# Patient Record
Sex: FEMALE | Race: WHITE | NOT HISPANIC OR LATINO | Employment: OTHER | URBAN - METROPOLITAN AREA
[De-identification: names, ages, dates, MRNs, and addresses within clinical notes are randomized per-mention and may not be internally consistent; named-entity substitution may affect disease eponyms.]

---

## 2018-01-05 ENCOUNTER — HISTORICAL (OUTPATIENT)
Dept: RADIOLOGY | Facility: HOSPITAL | Age: 48
End: 2018-01-05

## 2018-01-23 ENCOUNTER — HISTORICAL (OUTPATIENT)
Dept: ADMINISTRATIVE | Facility: HOSPITAL | Age: 48
End: 2018-01-23

## 2018-02-07 ENCOUNTER — HISTORICAL (OUTPATIENT)
Dept: INTERNAL MEDICINE | Facility: CLINIC | Age: 48
End: 2018-02-07

## 2018-02-07 LAB
T3FREE SERPL-MCNC: 2.66 PG/ML (ref 2.18–3.98)
T4 FREE SERPL-MCNC: 0.78 NG/DL (ref 0.76–1.46)
TSH SERPL-ACNC: 1.52 MIU/L (ref 0.36–3.74)

## 2018-02-21 ENCOUNTER — HISTORICAL (OUTPATIENT)
Dept: OTOLARYNGOLOGY | Facility: CLINIC | Age: 48
End: 2018-02-21

## 2018-03-07 ENCOUNTER — HISTORICAL (OUTPATIENT)
Dept: INTERNAL MEDICINE | Facility: CLINIC | Age: 48
End: 2018-03-07

## 2018-04-08 ENCOUNTER — HISTORICAL (OUTPATIENT)
Dept: SLEEP MEDICINE | Facility: HOSPITAL | Age: 48
End: 2018-04-08

## 2018-05-04 ENCOUNTER — HISTORICAL (OUTPATIENT)
Dept: INTERNAL MEDICINE | Facility: CLINIC | Age: 48
End: 2018-05-04

## 2018-06-18 ENCOUNTER — HISTORICAL (OUTPATIENT)
Dept: OTOLARYNGOLOGY | Facility: CLINIC | Age: 48
End: 2018-06-18

## 2019-01-10 ENCOUNTER — HISTORICAL (OUTPATIENT)
Dept: ADMINISTRATIVE | Facility: HOSPITAL | Age: 49
End: 2019-01-10

## 2020-02-11 ENCOUNTER — HISTORICAL (OUTPATIENT)
Dept: ADMINISTRATIVE | Facility: HOSPITAL | Age: 50
End: 2020-02-11

## 2020-02-19 ENCOUNTER — HISTORICAL (OUTPATIENT)
Dept: RADIOLOGY | Facility: HOSPITAL | Age: 50
End: 2020-02-19

## 2022-04-10 ENCOUNTER — HISTORICAL (OUTPATIENT)
Dept: ADMINISTRATIVE | Facility: HOSPITAL | Age: 52
End: 2022-04-10

## 2022-04-25 VITALS
HEIGHT: 62 IN | SYSTOLIC BLOOD PRESSURE: 138 MMHG | BODY MASS INDEX: 28.64 KG/M2 | OXYGEN SATURATION: 95 % | DIASTOLIC BLOOD PRESSURE: 92 MMHG | WEIGHT: 155.63 LBS

## 2022-05-02 DIAGNOSIS — H91.90 PROBLEMS WITH HEARING: Primary | ICD-10-CM

## 2022-05-12 DIAGNOSIS — H91.90 HEARING LOSS, UNSPECIFIED HEARING LOSS TYPE, UNSPECIFIED LATERALITY: ICD-10-CM

## 2022-05-12 DIAGNOSIS — D17.0 LIPOMA OF NECK: Primary | ICD-10-CM

## 2022-05-16 ENCOUNTER — CLINICAL SUPPORT (OUTPATIENT)
Dept: AUDIOLOGY | Facility: HOSPITAL | Age: 52
End: 2022-05-16

## 2022-05-16 DIAGNOSIS — H90.6 MIXED HEARING LOSS, BILATERAL: Primary | ICD-10-CM

## 2022-05-16 DIAGNOSIS — H91.90 PROBLEMS WITH HEARING: ICD-10-CM

## 2022-05-16 PROCEDURE — 92567 TYMPANOMETRY: CPT | Performed by: AUDIOLOGIST

## 2022-05-16 PROCEDURE — 92557 COMPREHENSIVE HEARING TEST: CPT | Performed by: AUDIOLOGIST

## 2022-05-16 NOTE — PROGRESS NOTES
Reason for visit:  Ms. Dance has a long-standing mixed hearing loss, AU, reporting no changes in hearing since previous evaluation.  Tinnitus and vertiginous issues are also unchanged.  She denies any recent middle ear issues.      Otoscopy:  Clear EAC's and TM's, bilaterally.    Tymps:    AD: Type 'B' tymp with normal (1.06mL) volume  AS: Type 'C' tymp (-184daPa) with minimal TM compliance (.19mL)    Acoustic Reflexes:  dnt    Audio Summary  AD:   Moderate to severe mixed hearing loss from 250-8kHz. Speech reception thresholds are in agreement with puretone findings. Speech discrimination score of 100% is considered excellent.     AS:   Moderate to severe mixed hearing loss from 250-8kHz. Speech reception thresholds are in agreement with puretone findings. Speech discrimination score of 96% is considered excellent.     Interpretation:  Today's audiological evaluation indicates no changes in hearing.  Immittance measures are considered abnormal suggesting middle ear pathology for the right ear and negative middle ear pressure and minimal TM mobility for the left ear. These results are also unchanged since previous evaluation.    Recommendations:    1. Continue with otologic follow up  2. Annual hearing evaluations  3. Binaural amplification (information on [place] given)

## 2022-05-18 DIAGNOSIS — D17.0 LIPOMA OF NECK: ICD-10-CM

## 2022-05-18 DIAGNOSIS — R22.1 LOCALIZED SWELLING, MASS AND LUMP, NECK: Primary | ICD-10-CM

## 2022-06-03 ENCOUNTER — HOSPITAL ENCOUNTER (OUTPATIENT)
Dept: RADIOLOGY | Facility: HOSPITAL | Age: 52
Discharge: HOME | End: 2022-06-03
Attending: OTOLARYNGOLOGY

## 2022-06-03 DIAGNOSIS — D17.0 LIPOMA OF NECK: ICD-10-CM

## 2022-06-03 DIAGNOSIS — R22.1 LOCALIZED SWELLING, MASS AND LUMP, NECK: ICD-10-CM

## 2022-06-03 PROCEDURE — 76536 US EXAM OF HEAD AND NECK: CPT | Mod: TC

## 2022-06-22 ENCOUNTER — PATIENT MESSAGE (OUTPATIENT)
Dept: OTOLARYNGOLOGY | Facility: CLINIC | Age: 52
End: 2022-06-22

## 2022-07-14 ENCOUNTER — HOSPITAL ENCOUNTER (OUTPATIENT)
Dept: RADIOLOGY | Facility: HOSPITAL | Age: 52
Discharge: HOME | End: 2022-07-14
Attending: OTOLARYNGOLOGY

## 2022-07-14 DIAGNOSIS — D17.0 LIPOMA OF NECK: ICD-10-CM

## 2022-07-14 DIAGNOSIS — H91.90 HEARING LOSS, UNSPECIFIED HEARING LOSS TYPE, UNSPECIFIED LATERALITY: ICD-10-CM

## 2022-07-14 PROCEDURE — 25500020 PHARM REV CODE 255

## 2022-07-14 PROCEDURE — 70491 CT SOFT TISSUE NECK W/DYE: CPT | Mod: TC

## 2022-07-14 RX ADMIN — IOPAMIDOL 75 ML: 755 INJECTION, SOLUTION INTRAVENOUS at 10:07

## 2022-07-20 ENCOUNTER — OFFICE VISIT (OUTPATIENT)
Dept: OTOLARYNGOLOGY | Facility: CLINIC | Age: 52
End: 2022-07-20

## 2022-07-20 VITALS
SYSTOLIC BLOOD PRESSURE: 125 MMHG | HEART RATE: 61 BPM | RESPIRATION RATE: 16 BRPM | DIASTOLIC BLOOD PRESSURE: 77 MMHG | TEMPERATURE: 99 F | HEIGHT: 61 IN | BODY MASS INDEX: 28.89 KG/M2 | WEIGHT: 153 LBS

## 2022-07-20 DIAGNOSIS — D17.0 LIPOMA OF NECK: Primary | ICD-10-CM

## 2022-07-20 DIAGNOSIS — H83.8X3 SUPERIOR SEMICIRCULAR CANAL DEHISCENCE OF BOTH EARS: ICD-10-CM

## 2022-07-20 DIAGNOSIS — H90.6 MIXED CONDUCTIVE AND SENSORINEURAL HEARING LOSS OF BOTH EARS: ICD-10-CM

## 2022-07-20 DIAGNOSIS — Z01.818 OTHER SPECIFIED PRE-OPERATIVE EXAMINATION: Primary | ICD-10-CM

## 2022-07-20 PROCEDURE — 99214 OFFICE O/P EST MOD 30 MIN: CPT | Mod: PBBFAC | Performed by: OTOLARYNGOLOGY

## 2022-07-20 NOTE — PROGRESS NOTES
"Patient Name: Glenda Huval Dance   YOB: 1970     Chief Complaint:   Chief Complaint   Patient presents with    Follow-up     Discuss Results MRI/US        History of Present Illness:  46 yo female referred for evaluation of a thyroid nodule and hearing loss. In October, patient suffered an episode of angioedema after taking Augmentin and required 5 days of intubation. At that time a scan was obtained and she was incidentally found to have thyroid nodules measuring up to 1.2 cm. She also has a longstanding history of hearing loss. She states that since she was a kid she has had left sided hearing loss, now has some in the right. Had pulsatile tinnitus in the right and underwent repair of what sounds like an encephalocele by Dr. Villalobos (?) in [place] about 8-9 years ago. Was suppose to have surgery on the left and "rods" placed in her ears (? OCR or stapes) but lost her insurance. Last audio was 7 years ago. Patient also complains of constantly needing to clear her throat and persistent hoarseness since her intubation. Per record intubation was uneventful and a 7.5 ETT was used.    1/23/18: Improved hoarseness since last visit, did not see ST or pick-up PPI. Denies otalgia, odynophagia, dysphagia, dyspnea, new masses/lesions. Weight stable. Unable to perform audio 2/2 cerumen impaction. Thyroid US shows 2 solid nodules on left, amenable to FNA. Has appointment on 1/30/18 with IR per patient; received a message yesterday.    5/17/18: Here for f/u. Pt had surgery with Rosa Elena and Nidhi in [facility] a few years ago. 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 She has persistent right CHL and worse LEft CHL. 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 Was going to get BAHA with rosa elena but lost insurance.  Shes unsure why she is here today [1]    1/10/19: Here for f/u for CHL and thyroid nodules. Hearing stable subjectively. Had episode of spinning " dizziness exacerbated by head movement on 12/26/18, but this resolved and she hasn't had any spinning since. [1]    4/25/19: 49yoF smoker that presents for evaluation for FNA. She had a thyroid US in 2018 that showed a 2cm left superior pole nodule and 1.5cm inferior pole nodule. She notes globus sensation and weird sensation in throat. Intermittent dysphonia worst in the AM. PND. Thick mucus/phlegm that requires clearing her throat/coughing often. Discomfort in midline chest intermittently. Not on PPI. Most recent TFTs were WNL. Denies dyspnea, dysphagia, weight loss.    Smokes 1-1.5PPD [1]    2/11/20: Here for annual follow-up with audio. Audio stable. Patient reports dizziness that occurred a few months ago with spinning, nausea, vomiting exacerbated with head movements. This resolved and since she has had dizziness with eye movement at rest, with ambulation or driving. Last seconds. Feels that her vision becomes blurry. Feels hearing is stable, no otalgia, otorrhea, aural fullness or changes in tinnitus. Current smoker. [1]    April 26, 2022:  Patient presents today for follow-up of mass involving her right posterior neck. In reviewing the patient's chart patient was found to have probable lipoma involving the right posterior neck. Patient had a CT scan of the neck done on October 5, 2017, and it showed findings of a 3.6 x 2.2 cm mass in the right posterior neck and subcutaneous tissue which would be consistent with a lipoma. An ultrasound of the soft tissue of the neck was then done on March 7, 2018, and showed the lesion had increased in size to 5.2 x 5.0 x 1.8cm.  Since that time the patient has felt that the lesion has at least doubled in size. She does experience some occipital headaches on that side as well as some soreness in the neck and she is not sure if it is related to this lesion. She has no weakness or paresthesias involving the upper extremities or shoulder. No tenderness. No drainage.  Also of  note she does have a history of hearing loss and had undergone repair of an encephalocele and right superior semicircular canal occlusion via a right middle fossa approach by Dr. Ng several years ago. She had a persistent right conductive hearing loss and a left hearing loss. Records indicate that she also likely had a dehiscence on the left with an encephalocele. She was to get a BAHA hearing aid with Dr. Villalobos, but had lost her insurance. Her last audiogram was in February 2020.    July 20, 2022:   52-year-old female presents today for follow-up of suspected lipoma the right posterior neck and hearing loss with history of superior semicircular canal dehiscence.  In regards to her lipoma she has not noticed any change since her last appointment.  She had a CT scan of the soft tissues of the neck with contrast done on July 14, 2022, which showed the presence of a 4.8 x 7.9 x 5.4 cm subcutaneous soft tissue mass with consistency of a lipoma in the right posterior neck.  Previously on a CT scan done on October 5, 2017 it measured 2.3 x 3.8 x 2.4 cm.  On ultrasound done on Brie 3, 2022, the lesion measured 9.1 x 8.5 x 3.2 cm.  Lesion appeared to be confined to the subcutaneous tissue and did not invade any underlying muscle.  Radiologist indicated there were no concerning factors.  Results were discussed with the patient today.  The patient is still having some discomfort in her neck and occipital region as previously described.  Audiogram was done on May 16, 2022, and this showed the presence of a moderate to severe mixed hearing loss in the right ear at 250-8000 hertz and in the left ear a moderate to severe mixed hearing loss at 250-8000 hertz.  Speech reception threshold was 50 decibels in the right ear and 60 decibels in left ear with 100% discrimination in the right ear 96% discrimination in the left ear.  She had type B tympanograms bilaterally.  On examination of the ears on the CT scan of the neck she  "does not appear to have middle ear effusions.          Past Medical History:  Past Medical History:   Diagnosis Date    Sensorineural hearing loss (SNHL) of both ears      Past Surgical History:   Procedure Laterality Date     SECTION  2001    EAR RECONSTRUCTION Right 2012    HYSTERECTOMY         Review of Systems:  Unremarkable except as mentioned above.    Current Medications:  No current outpatient medications on file.     No current facility-administered medications for this visit.        Allergies:  Review of patient's allergies indicates:   Allergen Reactions    Penicillins Anaphylaxis        Physical Exam:  Vital signs:   Vitals:    22 0938   BP: 125/77   BP Location: Right arm   Patient Position: Sitting   BP Method: Medium (Automatic)   Pulse: 61   Resp: 16   Temp: 99 °F (37.2 °C)   TempSrc: Oral   Weight: 69.4 kg (153 lb)   Height: 5' 1" (1.549 m)   General:  Well-developed well-nourished female in no acute distress.  Voice is normal.  Head and face:  Normocephalic.  No facial lesions.  No temporomandibular joint tenderness or click.  Ears:  Right ear-auricle is normally developed.  External auditory canal is clear.  Tympanic membrane is nonerythematous.  No middle ear effusion.  Left ear-auricle is normally developed.  External auditory canal is clear.  Tympanic membrane is nonerythematous.  No middle ear effusion.  Nose:  Nasal dorsum unremarkable.  No rhinorrhea.  Neck:  Supple without palpable adenopathy.  Patient does continue to have a 9.5 x 8 cm mobile nontender subcutaneous mass involving the right lower posterior neck/trapezius region.  No overlying erythema.  Trachea midline.  No palpable thyromegaly.  Parotid and submandibular are normal to palpation.  Eyes:  Extraocular muscles intact.  No nystagmus.  No exophthalmos or enophthalmos.  Neurologic:  Alert and oriented.  Cranial nerves 2-12 are grossly normal.    Assessment/Plan:   52-year-old female with right posterior neck " mass which is suspected to be a lipoma.  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    Plan:  Discussed excision of the mass the neck with the patient.  I did discuss with her that I felt that it was benign lipoma.  I indicated to her that could not guarantee that this would eliminate the discomfort she is having in the posterior neck and occipital region and she does understand this.  She would like to proceed with excision and this has been scheduled for July 25, 2022.  After she recovers from this will discuss further about possibly referring back to otologist for further evaluation and treatment of her superior semicircular canal dehiscence and hearing loss.  Patient will be seen 2 days postoperatively should she have a drain placed at the time of surgery.      Lucas Foster M.D.

## 2022-07-22 ENCOUNTER — PATIENT MESSAGE (OUTPATIENT)
Dept: ADMINISTRATIVE | Facility: OTHER | Age: 52
End: 2022-07-22

## 2022-07-28 DIAGNOSIS — D17.0 LIPOMA OF NECK: Primary | ICD-10-CM

## 2022-07-28 RX ORDER — SODIUM CHLORIDE 0.9 % (FLUSH) 0.9 %
10 SYRINGE (ML) INJECTION
Status: DISCONTINUED | OUTPATIENT
Start: 2022-07-28 | End: 2024-01-18 | Stop reason: HOSPADM

## 2022-07-28 RX ORDER — LIDOCAINE HYDROCHLORIDE 10 MG/ML
1 INJECTION, SOLUTION EPIDURAL; INFILTRATION; INTRACAUDAL; PERINEURAL ONCE
Status: CANCELLED | OUTPATIENT
Start: 2022-07-28 | End: 2022-07-28

## 2022-08-19 DIAGNOSIS — Z01.818 OTHER SPECIFIED PRE-OPERATIVE EXAMINATION: Primary | ICD-10-CM

## 2024-01-17 ENCOUNTER — HOSPITAL ENCOUNTER (INPATIENT)
Facility: HOSPITAL | Age: 54
LOS: 1 days | Discharge: HOME | DRG: 871 | End: 2024-01-18
Attending: EMERGENCY MEDICINE | Admitting: INTERNAL MEDICINE
Payer: MEDICAID

## 2024-01-17 DIAGNOSIS — R06.00 DYSPNEA: ICD-10-CM

## 2024-01-17 DIAGNOSIS — J18.9 COMMUNITY ACQUIRED PNEUMONIA, UNSPECIFIED LATERALITY: Primary | ICD-10-CM

## 2024-01-17 PROBLEM — R06.02 SHORTNESS OF BREATH: Status: ACTIVE | Noted: 2024-01-17

## 2024-01-17 PROBLEM — R05.9 COUGH: Status: ACTIVE | Noted: 2024-01-17

## 2024-01-17 LAB
ANION GAP SERPL CALC-SCNC: 11 MEQ/L
APPEARANCE UR: CLEAR
BACTERIA #/AREA URNS AUTO: ABNORMAL /HPF
BASOPHILS # BLD AUTO: 0.05 X10(3)/MCL
BASOPHILS NFR BLD AUTO: 0.4 %
BILIRUB UR QL STRIP.AUTO: NEGATIVE
BNP BLD-MCNC: 30.9 PG/ML
BUN SERPL-MCNC: 9.9 MG/DL (ref 9.8–20.1)
CALCIUM SERPL-MCNC: 9.3 MG/DL (ref 8.4–10.2)
CHLORIDE SERPL-SCNC: 101 MMOL/L (ref 98–107)
CO2 SERPL-SCNC: 25 MMOL/L (ref 22–29)
COLOR UR AUTO: YELLOW
CREAT SERPL-MCNC: 0.81 MG/DL (ref 0.55–1.02)
CREAT/UREA NIT SERPL: 12
D DIMER PPP IA.FEU-MCNC: 1.36 UG/ML FEU (ref 0–0.5)
EOSINOPHIL # BLD AUTO: 0.1 X10(3)/MCL (ref 0–0.9)
EOSINOPHIL NFR BLD AUTO: 0.7 %
ERYTHROCYTE [DISTWIDTH] IN BLOOD BY AUTOMATED COUNT: 11.8 % (ref 11.5–17)
FLUAV AG UPPER RESP QL IA.RAPID: NOT DETECTED
FLUBV AG UPPER RESP QL IA.RAPID: NOT DETECTED
GFR SERPLBLD CREATININE-BSD FMLA CKD-EPI: >60 MLS/MIN/1.73/M2
GLUCOSE SERPL-MCNC: 122 MG/DL (ref 74–100)
GLUCOSE UR QL STRIP.AUTO: NORMAL
HCT VFR BLD AUTO: 40 % (ref 37–47)
HGB BLD-MCNC: 13.6 G/DL (ref 12–16)
HOLD SPECIMEN: NORMAL
HOLD SPECIMEN: NORMAL
HYALINE CASTS #/AREA URNS LPF: ABNORMAL /LPF
IMM GRANULOCYTES # BLD AUTO: 0.06 X10(3)/MCL (ref 0–0.04)
IMM GRANULOCYTES NFR BLD AUTO: 0.4 %
KETONES UR QL STRIP.AUTO: NEGATIVE
LACTATE SERPL-SCNC: 1.3 MMOL/L (ref 0.5–2.2)
LEUKOCYTE ESTERASE UR QL STRIP.AUTO: NEGATIVE
LYMPHOCYTES # BLD AUTO: 3.05 X10(3)/MCL (ref 0.6–4.6)
LYMPHOCYTES NFR BLD AUTO: 22.7 %
MAGNESIUM SERPL-MCNC: 1.7 MG/DL (ref 1.6–2.6)
MCH RBC QN AUTO: 31.8 PG (ref 27–31)
MCHC RBC AUTO-ENTMCNC: 34 G/DL (ref 33–36)
MCV RBC AUTO: 93.5 FL (ref 80–94)
MONOCYTES # BLD AUTO: 1.44 X10(3)/MCL (ref 0.1–1.3)
MONOCYTES NFR BLD AUTO: 10.7 %
NEUTROPHILS # BLD AUTO: 8.72 X10(3)/MCL (ref 2.1–9.2)
NEUTROPHILS NFR BLD AUTO: 65.1 %
NITRITE UR QL STRIP.AUTO: NEGATIVE
NRBC BLD AUTO-RTO: 0 %
PH UR STRIP.AUTO: 6 [PH]
PLATELET # BLD AUTO: 305 X10(3)/MCL (ref 130–400)
PMV BLD AUTO: 9.5 FL (ref 7.4–10.4)
POTASSIUM SERPL-SCNC: 3.9 MMOL/L (ref 3.5–5.1)
PROT UR QL STRIP.AUTO: ABNORMAL
RBC # BLD AUTO: 4.28 X10(6)/MCL (ref 4.2–5.4)
RBC #/AREA URNS AUTO: ABNORMAL /HPF
RBC UR QL AUTO: ABNORMAL
RSV A 5' UTR RNA NPH QL NAA+PROBE: NOT DETECTED
SARS-COV-2 RNA RESP QL NAA+PROBE: NOT DETECTED
SODIUM SERPL-SCNC: 137 MMOL/L (ref 136–145)
SP GR UR STRIP.AUTO: 1.01 (ref 1–1.03)
SQUAMOUS #/AREA URNS LPF: ABNORMAL /HPF
TROPONIN I SERPL-MCNC: 0.01 NG/ML (ref 0–0.04)
UROBILINOGEN UR STRIP-ACNC: ABNORMAL
WBC # SPEC AUTO: 13.42 X10(3)/MCL (ref 4.5–11.5)
WBC #/AREA URNS AUTO: ABNORMAL /HPF

## 2024-01-17 PROCEDURE — 25000003 PHARM REV CODE 250

## 2024-01-17 PROCEDURE — 85379 FIBRIN DEGRADATION QUANT: CPT | Performed by: EMERGENCY MEDICINE

## 2024-01-17 PROCEDURE — G0378 HOSPITAL OBSERVATION PER HR: HCPCS

## 2024-01-17 PROCEDURE — 84484 ASSAY OF TROPONIN QUANT: CPT | Performed by: EMERGENCY MEDICINE

## 2024-01-17 PROCEDURE — 87040 BLOOD CULTURE FOR BACTERIA: CPT | Performed by: EMERGENCY MEDICINE

## 2024-01-17 PROCEDURE — 80048 BASIC METABOLIC PNL TOTAL CA: CPT | Performed by: EMERGENCY MEDICINE

## 2024-01-17 PROCEDURE — 94640 AIRWAY INHALATION TREATMENT: CPT

## 2024-01-17 PROCEDURE — 81001 URINALYSIS AUTO W/SCOPE: CPT | Performed by: EMERGENCY MEDICINE

## 2024-01-17 PROCEDURE — 96372 THER/PROPH/DIAG INJ SC/IM: CPT

## 2024-01-17 PROCEDURE — 0241U COVID/RSV/FLU A&B PCR: CPT | Performed by: EMERGENCY MEDICINE

## 2024-01-17 PROCEDURE — 85025 COMPLETE CBC W/AUTO DIFF WBC: CPT | Performed by: EMERGENCY MEDICINE

## 2024-01-17 PROCEDURE — 25000242 PHARM REV CODE 250 ALT 637 W/ HCPCS: Performed by: EMERGENCY MEDICINE

## 2024-01-17 PROCEDURE — 63600175 PHARM REV CODE 636 W HCPCS

## 2024-01-17 PROCEDURE — 93005 ELECTROCARDIOGRAM TRACING: CPT

## 2024-01-17 PROCEDURE — 99285 EMERGENCY DEPT VISIT HI MDM: CPT | Mod: 25

## 2024-01-17 PROCEDURE — 21400001 HC TELEMETRY ROOM

## 2024-01-17 PROCEDURE — 25500020 PHARM REV CODE 255

## 2024-01-17 PROCEDURE — 96365 THER/PROPH/DIAG IV INF INIT: CPT

## 2024-01-17 PROCEDURE — 63600175 PHARM REV CODE 636 W HCPCS: Performed by: EMERGENCY MEDICINE

## 2024-01-17 PROCEDURE — 83880 ASSAY OF NATRIURETIC PEPTIDE: CPT | Performed by: EMERGENCY MEDICINE

## 2024-01-17 PROCEDURE — 83735 ASSAY OF MAGNESIUM: CPT | Performed by: EMERGENCY MEDICINE

## 2024-01-17 PROCEDURE — 83605 ASSAY OF LACTIC ACID: CPT | Performed by: EMERGENCY MEDICINE

## 2024-01-17 RX ORDER — SODIUM CHLORIDE 0.9 % (FLUSH) 0.9 %
10 SYRINGE (ML) INJECTION
Status: DISCONTINUED | OUTPATIENT
Start: 2024-01-17 | End: 2024-01-18 | Stop reason: HOSPADM

## 2024-01-17 RX ORDER — ENOXAPARIN SODIUM 100 MG/ML
40 INJECTION SUBCUTANEOUS EVERY 24 HOURS
Status: DISCONTINUED | OUTPATIENT
Start: 2024-01-17 | End: 2024-01-18 | Stop reason: HOSPADM

## 2024-01-17 RX ORDER — LEVOFLOXACIN 5 MG/ML
750 INJECTION, SOLUTION INTRAVENOUS ONCE
Status: COMPLETED | OUTPATIENT
Start: 2024-01-17 | End: 2024-01-17

## 2024-01-17 RX ORDER — TALC
6 POWDER (GRAM) TOPICAL NIGHTLY PRN
Status: DISCONTINUED | OUTPATIENT
Start: 2024-01-17 | End: 2024-01-18 | Stop reason: HOSPADM

## 2024-01-17 RX ORDER — IPRATROPIUM BROMIDE AND ALBUTEROL SULFATE 2.5; .5 MG/3ML; MG/3ML
3 SOLUTION RESPIRATORY (INHALATION) EVERY 4 HOURS
Status: DISCONTINUED | OUTPATIENT
Start: 2024-01-18 | End: 2024-01-18 | Stop reason: HOSPADM

## 2024-01-17 RX ORDER — FUROSEMIDE 10 MG/ML
40 INJECTION INTRAMUSCULAR; INTRAVENOUS
Status: DISCONTINUED | OUTPATIENT
Start: 2024-01-17 | End: 2024-01-17

## 2024-01-17 RX ORDER — IPRATROPIUM BROMIDE AND ALBUTEROL SULFATE 2.5; .5 MG/3ML; MG/3ML
6 SOLUTION RESPIRATORY (INHALATION)
Status: COMPLETED | OUTPATIENT
Start: 2024-01-17 | End: 2024-01-17

## 2024-01-17 RX ORDER — PREDNISONE 20 MG/1
40 TABLET ORAL DAILY
Status: DISCONTINUED | OUTPATIENT
Start: 2024-01-18 | End: 2024-01-18 | Stop reason: HOSPADM

## 2024-01-17 RX ORDER — IPRATROPIUM BROMIDE AND ALBUTEROL SULFATE 2.5; .5 MG/3ML; MG/3ML
SOLUTION RESPIRATORY (INHALATION)
Status: DISPENSED
Start: 2024-01-17 | End: 2024-01-18

## 2024-01-17 RX ORDER — LEVOFLOXACIN 750 MG/1
750 TABLET ORAL DAILY
Status: DISCONTINUED | OUTPATIENT
Start: 2024-01-18 | End: 2024-01-18 | Stop reason: HOSPADM

## 2024-01-17 RX ADMIN — ENOXAPARIN SODIUM 40 MG: 100 INJECTION SUBCUTANEOUS at 10:01

## 2024-01-17 RX ADMIN — Medication 6 MG: at 10:01

## 2024-01-17 RX ADMIN — IOHEXOL 100 ML: 350 INJECTION, SOLUTION INTRAVENOUS at 07:01

## 2024-01-17 RX ADMIN — IPRATROPIUM BROMIDE AND ALBUTEROL SULFATE 6 ML: .5; 3 SOLUTION RESPIRATORY (INHALATION) at 05:01

## 2024-01-17 RX ADMIN — PREDNISONE 60 MG: 10 TABLET ORAL at 05:01

## 2024-01-17 RX ADMIN — LEVOFLOXACIN 750 MG: 750 INJECTION, SOLUTION INTRAVENOUS at 07:01

## 2024-01-17 NOTE — ED PROVIDER NOTES
ED PROVIDER NOTE  2024    CHIEF COMPLAINT:   Chief Complaint   Patient presents with    Shortness of Breath     Shortness of breath x 4 days with cough, progressively worse. Pain upon coughing.       HISTORY OF PRESENT ILLNESS:   Glenda Huval Dance is a 53 y.o. female who presents with chief complaint Shortness of breath.  Onset was 4 days ago whenever she began having progressively worsening shortness of breath associated with cough productive of clear sputum, wheezing, and chest pain that she states is brought on by coughing.  Reports that she has been using her inhalers without any significant relief.  No known fevers or sick contacts.    The history is provided by the patient.         REVIEW OF SYSTEMS: as noted in the HPI.  NURSING NOTES REVIEWED      PAST MEDICAL/SURGICAL HISTORY:   Past Medical History:   Diagnosis Date    Sensorineural hearing loss (SNHL) of both ears       Past Surgical History:   Procedure Laterality Date     SECTION  2001    EAR RECONSTRUCTION Right 2012    HYSTERECTOMY         FAMILY HISTORY: History reviewed. No pertinent family history.    SOCIAL HISTORY:   Social History     Tobacco Use    Smoking status: Every Day     Current packs/day: 1.00     Average packs/day: 1 pack/day for 43.0 years (43.0 ttl pk-yrs)     Types: Cigarettes    Smokeless tobacco: Never   Substance Use Topics    Alcohol use: Not Currently     Comment: occasionally    Drug use: Yes     Types: Marijuana     Comment: 3 days ago       ALLERGIES:   Review of patient's allergies indicates:   Allergen Reactions    Penicillins Anaphylaxis and Swelling       PHYSICAL EXAM:  Initial Vitals [24 1705]   BP Pulse Resp Temp SpO2   (!) 161/73 89 20 99.5 °F (37.5 °C) 96 %      MAP       --         Physical Exam    Nursing note and vitals reviewed.  Constitutional: She appears well-developed and well-nourished. She appears distressed.   HENT:   Head: Normocephalic and atraumatic.   Mouth/Throat: Uvula is midline  and mucous membranes are normal.   Eyes: EOM are normal. Pupils are equal, round, and reactive to light.   Neck: Trachea normal. Neck supple.   Cardiovascular:  Normal rate, regular rhythm and normal pulses.           Pulmonary/Chest: Tachypnea noted. She is in respiratory distress (Mild). She has decreased breath sounds. She has wheezes. She has rales in the right lower field and the left lower field.   Abdominal: Abdomen is soft. Bowel sounds are normal. There is no abdominal tenderness. There is no rebound and no guarding.   Musculoskeletal:         General: Normal range of motion.      Cervical back: Neck supple.     Neurological: She is alert and oriented to person, place, and time. GCS eye subscore is 4. GCS verbal subscore is 5. GCS motor subscore is 6.   Skin: Skin is warm and dry.   Psychiatric: She has a normal mood and affect. Her speech is normal. Thought content normal.         RESULTS:  Labs Reviewed   BASIC METABOLIC PANEL - Abnormal; Notable for the following components:       Result Value    Glucose Level 122 (*)     All other components within normal limits   URINALYSIS, REFLEX TO URINE CULTURE - Abnormal; Notable for the following components:    Protein, UA 2+ (*)     Blood, UA 1+ (*)     Urobilinogen, UA 2+ (*)     Bacteria, UA Trace (*)     Squamous Epithelial Cells, UA Trace (*)     All other components within normal limits   CBC WITH DIFFERENTIAL - Abnormal; Notable for the following components:    WBC 13.42 (*)     MCH 31.8 (*)     Mono # 1.44 (*)     IG# 0.06 (*)     All other components within normal limits   D DIMER, QUANTITATIVE - Abnormal; Notable for the following components:    D-Dimer 1.36 (*)     All other components within normal limits   B-TYPE NATRIURETIC PEPTIDE - Normal   MAGNESIUM - Normal   COVID/RSV/FLU A&B PCR - Normal    Narrative:     The Xpert Xpress SARS-CoV-2/FLU/RSV plus is a rapid, multiplexed real-time PCR test intended for the simultaneous qualitative detection and  differentiation of SARS-CoV-2, Influenza A, Influenza B, and respiratory syncytial virus (RSV) viral RNA in either nasopharyngeal swab or nasal swab specimens.         LACTIC ACID, PLASMA - Normal   TROPONIN I - Normal   CBC W/ AUTO DIFFERENTIAL    Narrative:     The following orders were created for panel order CBC auto differential.  Procedure                               Abnormality         Status                     ---------                               -----------         ------                     CBC with Differential[[id number]]        Abnormal            Final result                 Please view results for these tests on the individual orders.   EXTRA TUBES    Narrative:     The following orders were created for panel order EXTRA TUBES.  Procedure                               Abnormality         Status                     ---------                               -----------         ------                     Light Blue Top Hold[[id number]]                             Final result               Gold Top Hold[[id number]]                                   Final result                 Please view results for these tests on the individual orders.   LIGHT BLUE TOP HOLD   GOLD TOP HOLD     Imaging Results              CTA Chest Non-Coronary (PE Studies) (Final result)  Result time 01/17/24 20:42:47      Final result by Corky Maki MD (01/17/24 20:42:47)                   Impression:      1. No evidence for pulmonary embolism.  2. Concern for developing infectious/inflammatory process especially of the middle lobe.  3. Other secondary findings as noted.      Electronically signed by: Corky Maki MD  Date:    01/17/2024  Time:    20:42               Narrative:    EXAMINATION:  CTA CHEST NON CORONARY (PE STUDIES)    CLINICAL HISTORY:  Pulmonary embolism (PE) suspected, positive D-dimer;    TECHNIQUE:  Multiple cross-sectional images were obtained from the lung bases the pubic symphysis after the intravenous  administration of 100 mL of Omnipaque 350.  Coronal and sagittal reformatted images were obtained.  An automated dose exposure technique was utilized.  This limits radiation does the patient.  MIP images were obtained.    COMPARISON:  Same day    FINDINGS:  Heart size is within normal limits.  Coronary calcifications are identified.  No evidence for reflux of contrast in the IVC.  No shift of the interventricular septum.  Trace pericardial thickening versus effusion is identified.  Course and caliber of the thoracic aorta is normal with a 2 vessel aortic arch.  The great vessels are patent.  The main pulmonary artery is of normal caliber and is adequately opacified.  No evidence for central or distal filling defect to suggest pulmonary embolism.  Shotty lymph nodes are identified including calcified and noncalcified mediastinal and hilar lymph nodes.    Coarse interstitial markings lungs with mosaic attenuation diffuse tree-in-bud opacifications especially within the middle lobe.  A spiculated nodules identified in the middle lobe measuring 7 mm image number 56 of series 12.  No consolidation is identified within the right middle lobe.  No evidence for pulmonary infarct.  No pleural thickening or pleural effusion.  Calcified granulomas are identified.  Trachea and airway are patent.    No suspicious osseous lesions.  Visualized hollow and solid viscera of the upper abdomen demonstrate hepatic steatosis.  Questionable lipoma in the upper back in the midline.                                       X-Ray Chest AP Portable (Final result)  Result time 01/17/24 18:13:49      Final result by Nikolai Escoto MD (01/17/24 18:13:49)                   Impression:      Mild interstitial prominence may reflect chronic change or pulmonary vascular congestion.      Electronically signed by: Nikolai Escoto  Date:    01/17/2024  Time:    18:13               Narrative:    EXAMINATION:  XR CHEST AP PORTABLE    CLINICAL  HISTORY:  dyspnea;    TECHNIQUE:  Frontal view(s) of the chest.    COMPARISON:  Radiography 10/06/2017    FINDINGS:  Normal cardiac silhouette.  The lungs are well-inflated.  Mild generalized interstitial prominence.  Several calcified granulomas and hilar lymph nodes are similar to prior exam.  No defined consolidation.  No significant pleural effusion or discernible pneumothorax.                                      PROCEDURES:  Procedures    ECG:  EKG Readings: (Independently Interpreted)   Initial Reading: No STEMI. Rhythm: Normal Sinus Rhythm. Heart Rate: 89. Ectopy: No Ectopy. Conduction: LBBB. Axis: Normal.       ED COURSE AND MEDICAL DECISION MAKING:  Medications   albuterol-ipratropium (DUO-NEB) 2.5 mg-0.5 mg/3 mL nebulizer solution (  Not Given 1/17/24 1730)   predniSONE tablet 60 mg (60 mg Oral Given 1/17/24 1716)   albuterol-ipratropium 2.5 mg-0.5 mg/3 mL nebulizer solution 6 mL (6 mLs Nebulization Given 1/17/24 1721)   levoFLOXacin 750 mg/150 mL IVPB 750 mg (0 mg Intravenous Stopped 1/17/24 2053)   iohexoL (OMNIPAQUE 350) 350 mg iodine/mL injection (100 mLs Intravenous Given 1/17/24 1930)     ED Course as of 01/19/24 2108 Wed Jan 17, 2024 1757 Reports minimal improvement after duoneb. Wheezing has improved some but still diminished. [IB]   1807 WBC(!): 13.42 [IB]   1807 Hemoglobin: 13.6 [IB]   1807 Platelet Count: 305 [IB]   1826 Creatinine: 0.81 [IB]   1826 Magnesium : 1.70 [IB]   1827 BNP: 30.9 [IB]   1843 Leukocytes, UA: Negative [IB]   1843 NITRITE UA: Negative [IB]   1844 D-Dimer(!): 1.36 [IB]   1900 Influenza A, Molecular: Not Detected [IB]   1900 Influenza B, Molecular: Not Detected [IB]   1900 RSV Ag by Molecular Method: Not Detected [IB]   1900 SARS-CoV2 (COVID-19) Qualitative PCR: Not Detected [IB]   1911 Lactate, Beni: 1.3 [IB]   1918 Troponin I: 0.013 [IB]      ED Course User Index  [IB] Gabby, Ramses, DO        Medical Decision Making  53-year-old female who presents with progressively  worsening shortness of breath associated with cough productive of clear sputum over the past couple of days.  Given DuoNeb with minimal improvement.  CBC shows a leukocytosis of 13.42.  Sepsis orders initiated and she was given Levaquin to cover for possible community-acquired pneumonia.  Chest x-ray read by radiologist showing mild interstitial prominence.  BNP normal.  D-dimer elevated so CTA chest was obtained which shows no evidence of PE but there was concern for developing infectious process especially in the middle lobe.  We will admit for further medical evaluation or treatment.  I have spoken with the patient and/or caregivers. I have explained the patient's condition, diagnoses and treatment plan based on the information available to me at this time. I have answered the patient's and/or caregiver's questions and addressed any concerns. The patient and/or caregivers have as good an understanding of the patient's diagnosis, condition and treatment plan as can be expected at this point. The patient has been stabilized within the capability of the emergency department. The patient will be transported for further care and management or will be moved to an observation or inpatient service. I have communicated with the staff or medical practitioner taking over this patient's care.    Amount and/or Complexity of Data Reviewed  External Data Reviewed: notes.  Labs: ordered. Decision-making details documented in ED Course.     Details: 3/7/2018:  Summary  The left ventricular ejection fraction estimated at 51 %.  Left ventricular size is normal.  Normal size right ventricle cavity.  There is mild aortic stenosis with mean gradient of 15 mmHg.  Trace mitral regurgitation.  Structurally normal mitral valve.  No evidence of significant pericardial effusion is noted.  Radiology: ordered.  ECG/medicine tests: ordered and independent interpretation performed.    Risk  Prescription drug management.  Decision regarding  hospitalization.        CLINICAL IMPRESSION:  1. Community acquired pneumonia, unspecified laterality    2. Dyspnea        DISPOSITION:   ED Disposition Condition    Observation Stable                    Ramses Pierre DO  01/19/24 2650

## 2024-01-18 VITALS
BODY MASS INDEX: 31.39 KG/M2 | RESPIRATION RATE: 18 BRPM | WEIGHT: 166.25 LBS | HEIGHT: 61 IN | TEMPERATURE: 99 F | HEART RATE: 81 BPM | SYSTOLIC BLOOD PRESSURE: 136 MMHG | DIASTOLIC BLOOD PRESSURE: 74 MMHG | OXYGEN SATURATION: 93 %

## 2024-01-18 PROBLEM — R06.02 SHORTNESS OF BREATH: Status: RESOLVED | Noted: 2024-01-17 | Resolved: 2024-01-18

## 2024-01-18 PROBLEM — R05.9 COUGH: Status: RESOLVED | Noted: 2024-01-17 | Resolved: 2024-01-18

## 2024-01-18 LAB
ANION GAP SERPL CALC-SCNC: 10 MEQ/L
B PERT.PT PRMT NPH QL NAA+NON-PROBE: NOT DETECTED
BASOPHILS # BLD AUTO: 0.03 X10(3)/MCL
BASOPHILS NFR BLD AUTO: 0.3 %
BUN SERPL-MCNC: 11 MG/DL (ref 9.8–20.1)
C PNEUM DNA NPH QL NAA+NON-PROBE: NOT DETECTED
CALCIUM SERPL-MCNC: 9.6 MG/DL (ref 8.4–10.2)
CHLORIDE SERPL-SCNC: 104 MMOL/L (ref 98–107)
CO2 SERPL-SCNC: 23 MMOL/L (ref 22–29)
CREAT SERPL-MCNC: 0.75 MG/DL (ref 0.55–1.02)
CREAT/UREA NIT SERPL: 15
EOSINOPHIL # BLD AUTO: 0 X10(3)/MCL (ref 0–0.9)
EOSINOPHIL NFR BLD AUTO: 0 %
ERYTHROCYTE [DISTWIDTH] IN BLOOD BY AUTOMATED COUNT: 11.6 % (ref 11.5–17)
EST. AVERAGE GLUCOSE BLD GHB EST-MCNC: 105.4 MG/DL
GFR SERPLBLD CREATININE-BSD FMLA CKD-EPI: >60 MLS/MIN/1.73/M2
GLUCOSE SERPL-MCNC: 132 MG/DL (ref 74–100)
HADV DNA NPH QL NAA+NON-PROBE: NOT DETECTED
HBA1C MFR BLD: 5.3 %
HCOV 229E RNA NPH QL NAA+NON-PROBE: NOT DETECTED
HCOV HKU1 RNA NPH QL NAA+NON-PROBE: NOT DETECTED
HCOV NL63 RNA NPH QL NAA+NON-PROBE: NOT DETECTED
HCOV OC43 RNA NPH QL NAA+NON-PROBE: NOT DETECTED
HCT VFR BLD AUTO: 40 % (ref 37–47)
HGB BLD-MCNC: 13.3 G/DL (ref 12–16)
HMPV RNA NPH QL NAA+NON-PROBE: NOT DETECTED
HOLD SPECIMEN: NORMAL
HPIV1 RNA NPH QL NAA+NON-PROBE: NOT DETECTED
HPIV2 RNA NPH QL NAA+NON-PROBE: NOT DETECTED
HPIV3 RNA NPH QL NAA+NON-PROBE: NOT DETECTED
HPIV4 RNA NPH QL NAA+NON-PROBE: NOT DETECTED
IMM GRANULOCYTES # BLD AUTO: 0.06 X10(3)/MCL (ref 0–0.04)
IMM GRANULOCYTES NFR BLD AUTO: 0.5 %
LYMPHOCYTES # BLD AUTO: 1.18 X10(3)/MCL (ref 0.6–4.6)
LYMPHOCYTES NFR BLD AUTO: 10.4 %
M PNEUMO DNA NPH QL NAA+NON-PROBE: NOT DETECTED
MCH RBC QN AUTO: 31.6 PG (ref 27–31)
MCHC RBC AUTO-ENTMCNC: 33.3 G/DL (ref 33–36)
MCV RBC AUTO: 95 FL (ref 80–94)
MONOCYTES # BLD AUTO: 0.6 X10(3)/MCL (ref 0.1–1.3)
MONOCYTES NFR BLD AUTO: 5.3 %
NEUTROPHILS # BLD AUTO: 9.51 X10(3)/MCL (ref 2.1–9.2)
NEUTROPHILS NFR BLD AUTO: 83.5 %
NRBC BLD AUTO-RTO: 0 %
PLATELET # BLD AUTO: 318 X10(3)/MCL (ref 130–400)
PMV BLD AUTO: 9.6 FL (ref 7.4–10.4)
POTASSIUM SERPL-SCNC: 4.5 MMOL/L (ref 3.5–5.1)
RBC # BLD AUTO: 4.21 X10(6)/MCL (ref 4.2–5.4)
RSV RNA NPH QL NAA+NON-PROBE: NOT DETECTED
RV+EV RNA NPH QL NAA+NON-PROBE: NOT DETECTED
SODIUM SERPL-SCNC: 137 MMOL/L (ref 136–145)
WBC # SPEC AUTO: 11.38 X10(3)/MCL (ref 4.5–11.5)

## 2024-01-18 PROCEDURE — 25000242 PHARM REV CODE 250 ALT 637 W/ HCPCS

## 2024-01-18 PROCEDURE — 85025 COMPLETE CBC W/AUTO DIFF WBC: CPT

## 2024-01-18 PROCEDURE — 94640 AIRWAY INHALATION TREATMENT: CPT | Mod: XB

## 2024-01-18 PROCEDURE — 83036 HEMOGLOBIN GLYCOSYLATED A1C: CPT

## 2024-01-18 PROCEDURE — 80048 BASIC METABOLIC PNL TOTAL CA: CPT

## 2024-01-18 PROCEDURE — 87798 DETECT AGENT NOS DNA AMP: CPT

## 2024-01-18 PROCEDURE — 21400001 HC TELEMETRY ROOM

## 2024-01-18 PROCEDURE — 63600175 PHARM REV CODE 636 W HCPCS

## 2024-01-18 PROCEDURE — 25000003 PHARM REV CODE 250

## 2024-01-18 PROCEDURE — 87077 CULTURE AEROBIC IDENTIFY: CPT

## 2024-01-18 PROCEDURE — 27000221 HC OXYGEN, UP TO 24 HOURS

## 2024-01-18 PROCEDURE — 94761 N-INVAS EAR/PLS OXIMETRY MLT: CPT

## 2024-01-18 RX ORDER — PREDNISONE 20 MG/1
20 TABLET ORAL DAILY
Qty: 3 TABLET | Refills: 0 | Status: SHIPPED | OUTPATIENT
Start: 2024-01-19 | End: 2024-03-06 | Stop reason: ALTCHOICE

## 2024-01-18 RX ORDER — LEVOFLOXACIN 750 MG/1
750 TABLET ORAL DAILY
Qty: 3 TABLET | Refills: 0 | Status: SHIPPED | OUTPATIENT
Start: 2024-01-19 | End: 2024-03-06 | Stop reason: ALTCHOICE

## 2024-01-18 RX ORDER — IPRATROPIUM BROMIDE AND ALBUTEROL SULFATE 2.5; .5 MG/3ML; MG/3ML
3 SOLUTION RESPIRATORY (INHALATION) EVERY 4 HOURS
Qty: 75 ML | Refills: 0 | Status: SHIPPED | OUTPATIENT
Start: 2024-01-18 | End: 2025-01-17

## 2024-01-18 RX ORDER — ACETAMINOPHEN 325 MG/1
650 TABLET ORAL EVERY 6 HOURS PRN
Status: DISCONTINUED | OUTPATIENT
Start: 2024-01-18 | End: 2024-01-18 | Stop reason: HOSPADM

## 2024-01-18 RX ADMIN — IPRATROPIUM BROMIDE AND ALBUTEROL SULFATE 3 ML: 2.5; .5 SOLUTION RESPIRATORY (INHALATION) at 03:01

## 2024-01-18 RX ADMIN — PREDNISONE 40 MG: 20 TABLET ORAL at 08:01

## 2024-01-18 RX ADMIN — IPRATROPIUM BROMIDE AND ALBUTEROL SULFATE 3 ML: 2.5; .5 SOLUTION RESPIRATORY (INHALATION) at 12:01

## 2024-01-18 RX ADMIN — LEVOFLOXACIN 750 MG: 750 TABLET, FILM COATED ORAL at 08:01

## 2024-01-18 RX ADMIN — IPRATROPIUM BROMIDE AND ALBUTEROL SULFATE 3 ML: 2.5; .5 SOLUTION RESPIRATORY (INHALATION) at 07:01

## 2024-01-18 RX ADMIN — IPRATROPIUM BROMIDE AND ALBUTEROL SULFATE 3 ML: 2.5; .5 SOLUTION RESPIRATORY (INHALATION) at 11:01

## 2024-01-18 NOTE — H&P
[facility] Medicine Wards History & Physical Note     Resident Team: [facility] Medicine List 2  Attending Physician: Keli Nicholson MD    Date of Admit: 1/17/2024    Chief Complaint     Shortness of Breath (Shortness of breath x 4 days with cough, progressively worse. Pain upon coughing.)      Subjective:      History of Present Illness:  Glenda Huval Dance is a 53 y.o. female with a history of  COPD, tobacco use, sensorineural hearing loss, benign neck lipoma who presented to  ED on 1/17/2024  with a primary complaint of  shortness of breath and cough. Patient states that starting 1 week ago she began experiencing a cough productive of white sputum and shortness of breath. Over the past 3 days her breathing and cough significantly worsened.  Denies recent travel or sick contacts.  She has been experiencing a sharp pain located near xiphoid process that worsens when coughing. She has a 40 pack year smoking history, continues to smoke 1 pack per day (states that she does have a chronic cough).  States she has not been able to smoke the past 3 days because of the cough.  She is not on home oxygen or inhalers at home. States that she has had to sleep sitting  upright over the past week because her cough worsens when lying down flat.   Prior to this she slept with 3 pillows at night. Does endorse occasional night sweats although, she states that this is a chronic issue.  Denies any headaches, dizziness, chest pain, palpitations, abdominal pain, nausea, vomiting, diarrhea, lower extremity edema.    Of note, patient does not have a PCP and has not seen a physician in many years.   She was not currently on any medications. She does have a history of the benign lipoma for which she followed with ENT.  In 2022 she did have an episode of angioedema after taking Augmentin which required 5 days of intubation.    In the ED,  BP 1/73, pulse 89, respirations 20, oxygen saturation 96% on room air, temperature 94° F.  Significant labs:  WBC  13.42, D-dimer 1.36, electrolytes within normal limits, troponin 0.013, lactate 1.3, BNP 30.9, COVID/flu / RSV negative, UA with 2+ protein, 1+ occult blood.  Chest x-ray revealed mild interstitial prominence reflecting chronic change vs  pulmonary vascular congestion.  CTA revealed no evidence of PE, possible developing infectious/inflammatory process  in middle lobe. She was given DuoNeb, prednisone 60 mg, Levaquin 750 mg.  Internal medicine consulted for admission.      Past Medical History:  Past Medical History:   Diagnosis Date    Sensorineural hearing loss (SNHL) of both ears        Past Surgical History:  Past Surgical History:   Procedure Laterality Date     SECTION  2001    EAR RECONSTRUCTION Right 2012    HYSTERECTOMY         Family History:  History reviewed. No pertinent family history.    Social History:  Social History     Tobacco Use    Smoking status: Every Day     Current packs/day: 1.00     Average packs/day: 1 pack/day for 43.0 years (43.0 ttl pk-yrs)     Types: Cigarettes    Smokeless tobacco: Never   Substance Use Topics    Alcohol use: Not Currently     Comment: occasionally    Drug use: Yes     Types: Marijuana     Comment: 3 days ago       Allergies:  Review of patient's allergies indicates:   Allergen Reactions    Penicillins Anaphylaxis and Swelling       Home Medications:  Prior to Admission medications    Not on File         Review of Systems:  Constitutional:  occasional night sweats (states this is chronic)  EENT: no sore throat, ear pain, sinus pain/congestion, nasal congestion/drainage  Respiratory:  positive for cough productive of white sputum, shortness of breath,  chest pain associated with cough  Cardiovascular: no palpitations, no edema,  positive for orthopnea  Gastrointestinal: no nausea, vomiting, or diarrhea. No abdominal pain  Genitourinary: no dysuria, no urinary frequency or urgency, no hematuria  Integumentary: no skin rash or abnormal lesion  Neurologic: no  "headache, no dizziness, no weakness or numbness           Objective:   Last 24 Hour Vital Signs:  BP  Min: 143/87  Max: 161/73  Temp  Av.5 °F (37.5 °C)  Min: 99.5 °F (37.5 °C)  Max: 99.5 °F (37.5 °C)  Pulse  Av.7  Min: 76  Max: 89  Resp  Av.3  Min: 20  Max: 22  SpO2  Av %  Min: 93 %  Max: 96 %  Height  Av' 1" (154.9 cm)  Min: 5' 1" (154.9 cm)  Max: 5' 1" (154.9 cm)  Weight  Av.4 kg (166 lb 3.6 oz)  Min: 75.4 kg (166 lb 3.6 oz)  Max: 75.4 kg (166 lb 3.6 oz)  Body mass index is 31.41 kg/m².  No intake/output data recorded.    Physical Examination:  General: well-developed, well-nourished, no acute distress  Eye: clear conjunctiva, eyelids normal  Head: normocephalic and atraumatic  Neck: full range of motion, supple  Respiratory:   on 2 L nasal cannula, decreased breath sounds bilaterally, no wheezing  Cardiovascular: regular rate and rhythm. Systolic murmur heard best at left upper sternal border. No JVD.  Gastrointestinal: soft, non-tender, non-distended with normal bowel sounds in all four quadrants. No masses palpated  Musculoskeletal: full range of motion of all extremities without limitation or discomfort  Integumentary: no rashes or skin lesions present, no erythema  Neurologic: AAO x 3, no dysarthria.  Psychiatric: cooperative with exam, good eye contact.      Laboratory:  Most Recent Data:  CBC:   Lab Results   Component Value Date    WBC 13.42 (H) 2024    HGB 13.6 2024    HCT 40.0 2024     2024    MCV 93.5 2024    RDW 11.8 2024     WBC Differential:   Recent Labs   Lab 24  1752   WBC 13.42*   HGB 13.6   HCT 40.0      MCV 93.5     BMP:   Lab Results   Component Value Date     2024    K 3.9 2024    CO2 25 2024    BUN 9.9 2024    CREATININE 0.81 2024    CALCIUM 9.3 2024    MG 1.70 2024     LFTs:   Lab Results   Component Value Date    ALBUMIN 3.8 2018    BILITOT 0.6 " "12/26/2018    AST 23 12/26/2018    ALKPHOS 88 12/26/2018    ALT 51 12/26/2018     Coags: No results found for: "INR", "PROTIME", "PTT"  FLP: No results found for: "CHOL", "HDL", "LDLCALC", "TRIG", "CHOLHDL"  DM:   Lab Results   Component Value Date    CREATININE 0.81 01/17/2024     Thyroid:   Lab Results   Component Value Date    TSH 1.520 02/07/2018      Anemia: No results found for: "IRON", "TIBC", "FERRITIN", "SATURATEDIRO"  No results found for: "IUSQKIMV88"  No results found for: "FOLATE"     Cardiac:   Lab Results   Component Value Date    TROPONINI 0.013 01/17/2024    BNP 30.9 01/17/2024     Urinalysis:   Lab Results   Component Value Date    COLORU YEL 09/29/2017    PHUA 6.0 01/17/2024    NITRITE Negative 09/29/2017    KETONESU NEG 09/29/2017    UROBILINOGEN 2+ (A) 01/17/2024    WBCUA 0-5 01/17/2024       Trended Lab Data:  Recent Labs   Lab 01/17/24 1752   WBC 13.42*   HGB 13.6   HCT 40.0      MCV 93.5   RDW 11.8      K 3.9   CO2 25   BUN 9.9   CREATININE 0.81       Trended Cardiac Data:  Recent Labs   Lab 01/17/24  1752 01/17/24 1837   TROPONINI  --  0.013   BNP 30.9  --        Microbiology Data:  Microbiology Results (last 7 days)       Procedure Component Value Units Date/Time    Blood culture x two cultures. Draw prior to antibiotics. [[id number]] Collected: 01/17/24 1837    Order Status: Sent Specimen: Blood from Arm, Right Updated: 01/17/24 1849    Blood culture x two cultures. Draw prior to antibiotics. [[id number]] Collected: 01/17/24 1837    Order Status: Sent Specimen: Blood from Hand, Right Updated: 01/17/24 1849             Other Results:    Radiology:  Imaging Results              CTA Chest Non-Coronary (PE Studies) (Final result)  Result time 01/17/24 20:42:47      Final result by Corky Maki MD (01/17/24 20:42:47)                   Impression:      1. No evidence for pulmonary embolism.  2. Concern for developing infectious/inflammatory process especially of the middle " lobe.  3. Other secondary findings as noted.      Electronically signed by: Corky Maki MD  Date:    01/17/2024  Time:    20:42               Narrative:    EXAMINATION:  CTA CHEST NON CORONARY (PE STUDIES)    CLINICAL HISTORY:  Pulmonary embolism (PE) suspected, positive D-dimer;    TECHNIQUE:  Multiple cross-sectional images were obtained from the lung bases the pubic symphysis after the intravenous administration of 100 mL of Omnipaque 350.  Coronal and sagittal reformatted images were obtained.  An automated dose exposure technique was utilized.  This limits radiation does the patient.  MIP images were obtained.    COMPARISON:  Same day    FINDINGS:  Heart size is within normal limits.  Coronary calcifications are identified.  No evidence for reflux of contrast in the IVC.  No shift of the interventricular septum.  Trace pericardial thickening versus effusion is identified.  Course and caliber of the thoracic aorta is normal with a 2 vessel aortic arch.  The great vessels are patent.  The main pulmonary artery is of normal caliber and is adequately opacified.  No evidence for central or distal filling defect to suggest pulmonary embolism.  Shotty lymph nodes are identified including calcified and noncalcified mediastinal and hilar lymph nodes.    Coarse interstitial markings lungs with mosaic attenuation diffuse tree-in-bud opacifications especially within the middle lobe.  A spiculated nodules identified in the middle lobe measuring 7 mm image number 56 of series 12.  No consolidation is identified within the right middle lobe.  No evidence for pulmonary infarct.  No pleural thickening or pleural effusion.  Calcified granulomas are identified.  Trachea and airway are patent.    No suspicious osseous lesions.  Visualized hollow and solid viscera of the upper abdomen demonstrate hepatic steatosis.  Questionable lipoma in the upper back in the midline.                                       X-Ray Chest AP Portable  (Final result)  Result time 01/17/24 18:13:49      Final result by Nikolai Escoto MD (01/17/24 18:13:49)                   Impression:      Mild interstitial prominence may reflect chronic change or pulmonary vascular congestion.      Electronically signed by: Nikolai Escoto  Date:    01/17/2024  Time:    18:13               Narrative:    EXAMINATION:  XR CHEST AP PORTABLE    CLINICAL HISTORY:  dyspnea;    TECHNIQUE:  Frontal view(s) of the chest.    COMPARISON:  Radiography 10/06/2017    FINDINGS:  Normal cardiac silhouette.  The lungs are well-inflated.  Mild generalized interstitial prominence.  Several calcified granulomas and hilar lymph nodes are similar to prior exam.  No defined consolidation.  No significant pleural effusion or discernible pneumothorax.                                         Assessment & Plan:     Community Acquired Pneumonia  COPD exacerbation  Sepsis - SIRS 2/4 (Respirations, WBC)  -Patient 40 pack year smoking history with worsening cough and shortness of breath.  - Not on home oxygen, currently saturating 96% on 2 L nasal cannula.  - WBC 13.42, D-dimer 1.36, lactate 1.3  -COVID/flu/ RSV negative.  -Chest x-ray revealed mild interstitial prominence reflecting chronic change vs  pulmonary vascular congestion.    -CTA revealed no evidence of PE, possible developing infectious/inflammatory process  in middle lobe.  -Given DuoNeb, prednisone 60 mg, Levaquin 750 mg in the ED.  -Pending blood culture, respiratory panel.  -Continue with Levaquin, Prednisone 40 mg daily, DuoNebs q.4h.    -Wean oxygen as tolerated, oxygen saturation goal 88-92%.    Systolic murmur  LBBB  - patient states she was told she had heart murmur in childhood, denies any other cardiac history.  - does endorse chronic 3 pillow orthopnea, no JVD or lower extremity edema on physical exam.  - BNP 30.9. Troponin 0.013.  - EKG revealed LBBB, no prior EKGs available for comparison.  -Consider echocardiogram for further  evaluation of murmur.     Spiculated Nodule (7mm)  -CTA noted 7mm spiculated nodule in middle lobe.  -Patient has extensive smoking history, endorses intermittent night sweats.  - Will need continued monitoring in outpatient setting.         CODE STATUS:  full  Access:  PIV  Antibiotics:  Levaquin  Diet: Adult Regular  DVT Prophylaxis:  Lovenox  GI Prophylaxis:  none  Fluids: None      Disposition:  Patient is a 53-year-old female with history of COPD placed in observation for community-acquired pneumonia.  Continue Levaquin.  Wean oxygen as tolerated. Patient does not currently have a PCP,  will need to establish care with IM clinic following discharge.          Neal Alvarenga MD  [facility] PGY-1

## 2024-01-18 NOTE — PROGRESS NOTES
Inpatient Nutrition Evaluation    Admit Date: 1/17/2024   Total duration of encounter: 1 day   Patient Age: 53 y.o.    Nutrition Recommendation/Prescription     Continue Regular diet as tolerated  Monitor po intake, wt, labs    Nutrition Assessment     Chart Review    Reason Seen: continuous nutrition monitoring    Malnutrition Screening Tool Results   Have you recently lost weight without trying?: No  Have you been eating poorly because of a decreased appetite?: No   MST Score: 0     Diagnosis: Community Acquired Pneumonia  COPD exacerbation  Sepsis - SIRS 2/4 (Respirations, WBC)  Systolic murmur  LBBB  Spiculated Nodule (7mm)    Relevant Medical History: COPD, tobacco use, sensorineural hearing loss, benign neck lipoma    Scheduled Medications:  albuterol-ipratropium, 3 mL, Q4H  enoxparin, 40 mg, Daily  levoFLOXacin, 750 mg, Daily  predniSONE, 40 mg, Daily    Continuous Infusions:   PRN Medications: acetaminophen, melatonin, sodium chloride 0.9%    Recent Labs   Lab 01/17/24  1752 01/18/24  0619    137   K 3.9 4.5   CALCIUM 9.3 9.6   MG 1.70  --    CHLORIDE 101 104   CO2 25 23   BUN 9.9 11.0   CREATININE 0.81 0.75   EGFRNORACEVR >60 >60   GLUCOSE 122* 132*   HGBA1C  --  5.3   WBC 13.42* 11.38   HGB 13.6 13.3   HCT 40.0 40.0     Nutrition Orders:  Diet Adult Regular      Appetite/Oral Intake: good/% of meals  Factors Affecting Nutritional Intake: constipation -- pt reports no BM since 1/15  Food/Buddhism/Cultural Preferences: none reported  Food Allergies: no known food allergies  Last Bowel Movement: 01/13/24  Wound(s):  None noted    Comments  1/18: Pt reports good po intake for breakfast. Pt denies any GI complaints at this time; reports episode of diarrhea on 1/15 but no BM since. Pt reports decreased appetite ~1wk pta; unsure of recent wt loss. Pt states UBW ~166#, current wt stable. Pt offered ONS but declined at this time. Gluc (H) -- no hx DM; likely steroid  "induced.      Anthropometrics    Height: 5' 1" (154.9 cm),    Last Weight: 75.4 kg (166 lb 3.6 oz) (24 2300), Weight Method: Bed Scale  BMI (Calculated): 31.4  BMI Classification: obese grade I (BMI 30-34.9)     Ideal Body Weight (IBW), Female: 105 lb     % Ideal Body Weight, Female (lb): 158.31 %                    Usual Body Weight (UBW), k.45 kg (#)  % Usual Body Weight: 100.14     Usual Weight Provided By: patient    Wt Readings from Last 5 Encounters:   24 75.4 kg (166 lb 3.6 oz)   22 69.4 kg (153 lb)   20 70.6 kg (155 lb 10.3 oz)     Weight Change(s) Since Admission:   Wt Readings from Last 1 Encounters:   24 2300 75.4 kg (166 lb 3.6 oz)   24 1705 75.4 kg (166 lb 3.6 oz)   Admit Weight: 75.4 kg (166 lb 3.6 oz) (24 1705), Weight Method: Bed Scale    Patient Education     Not applicable.    Nutrition Goals & Monitoring     Dietitian will monitor: food and beverage intake, weight, glucose/endocrine profile, and gastrointestinal profile    Nutrition Risk/Follow-Up: low (follow-up in 5-7 days)  Patients assigned 'low nutrition risk' status do not qualify for a full nutritional assessment but will be monitored and re-evaluated in a 5-7 day time period. Please consult if re-evaluation needed sooner.    "

## 2024-01-18 NOTE — PLAN OF CARE
01/18/24 1524   Discharge Assessment   Assessment Type Discharge Planning Assessment   Confirmed/corrected address, phone number and insurance Yes   Confirmed Demographics Correct on Facesheet   Source of Information patient   When was your last doctors appointment?   (PCP: Esau Centeno)   Does patient/caregiver understand observation status   (Inpatient)   Communicated EDISON with patient/caregiver Date not available/Unable to determine   Reason For Admission Dyspnea; CAP   People in Home spouse   Facility Arrived From: Home   Do you expect to return to your current living situation? Yes   Do you have help at home or someone to help you manage your care at home? Yes   Who are your caregiver(s) and their phone number(s)? Jr RIMA Bliss, P: [phone]   Prior to hospitilization cognitive status: Alert/Oriented;No Deficits   Current cognitive status: Alert/Oriented;No Deficits   Walking or Climbing Stairs Difficulty no   Dressing/Bathing Difficulty no   Home Accessibility not wheelchair accessible;stairs to enter home   Number of Stairs, Main Entrance four   Stair Railings, Main Entrance none   Home Layout Able to live on 1st floor   Equipment Currently Used at Home nebulizer   Readmission within 30 days? No   Patient currently being followed by outpatient case management? No   Do you currently have service(s) that help you manage your care at home? No   Do you take prescription medications? No   Do you have prescription coverage? No   Do you have any problems affording any of your prescribed medications? TBD   Who is going to help you get home at discharge? Family   How do you get to doctors appointments? family or friend will provide;car, drives self   Are you on dialysis? No   Do you take coumadin? No   Discharge Plan A Home with family   DME Needed Upon Discharge    (TBD)   Discharge Plan discussed with: Patient   Transition of Care Barriers Unisured   Physical Activity   On average, how many days per week  do you engage in moderate to strenuous exercise (like a brisk walk)? 0 days   On average, how many minutes do you engage in exercise at this level? 0 min   Financial Resource Strain   How hard is it for you to pay for the very basics like food, housing, medical care, and heating? Somewhat   Housing Stability   In the last 12 months, was there a time when you were not able to pay the mortgage or rent on time? N   In the last 12 months, how many places have you lived? 1   In the last 12 months, was there a time when you did not have a steady place to sleep or slept in a shelter (including now)? N   Transportation Needs   In the past 12 months, has lack of transportation kept you from medical appointments or from getting medications? no   In the past 12 months, has lack of transportation kept you from meetings, work, or from getting things needed for daily living? No   Food Insecurity   Within the past 12 months, you worried that your food would run out before you got the money to buy more. Never true   Within the past 12 months, the food you bought just didn't last and you didn't have money to get more. Never true   Stress   Do you feel stress - tense, restless, nervous, or anxious, or unable to sleep at night because your mind is troubled all the time - these days? To some exte   Social Connections   In a typical week, how many times do you talk on the phone with family, friends, or neighbors? More than 3   How often do you get together with friends or relatives? More than 3   How often do you attend Spiritism or Moravian services? 1 to 4   Do you belong to any clubs or organizations such as Spiritism groups, unions, fraternal or athletic groups, or school groups? No   Are you , , , , never , or living with a partner? Living with   Alcohol Use   Q1: How often do you have a drink containing alcohol? Monthly or l   Q2: How many drinks containing alcohol do you have on a typical day when  you are drinking? 1 or 2   Q3: How often do you have six or more drinks on one occasion? Never   OTHER   Name(s) of People in Home RIMA Diez, P: [phone]     Patient is uninsured; does not receive any government benefits; CM will follow for DC planning needs.

## 2024-01-19 PROBLEM — J44.9 CHRONIC OBSTRUCTIVE PULMONARY DISEASE: Status: ACTIVE | Noted: 2024-01-19

## 2024-01-19 NOTE — DISCHARGE SUMMARY
[facility] Medicine Discharge Summary    Admitting Physician: Keli Nicholson MD  Attending Physician: No att. providers found  Date of Admit: 1/17/2024  Date of Discharge: 1/18/2024    Condition: Stable  Outcome: Condition has improved and patient is now ready for discharge.  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 female with a history of  COPD, tobacco use, sensorineural hearing loss, benign neck lipoma who presented to  ED on 1/17/2024  with a primary complaint of  shortness of breath and cough. Patient states that starting 1 week ago she began experiencing a cough productive of white sputum and shortness of breath. Over the past 3 days her breathing and cough significantly worsened.  Denies recent travel or sick contacts.  She has been experiencing a sharp pain located near xiphoid process that worsens when coughing. She has a 40 pack year smoking history, continues to smoke 1 pack per day (states that she does have a chronic cough).  States she has not been able to smoke the past 3 days because of the cough.  She is not on home oxygen or inhalers at home. States that she has had to sleep sitting  upright over the past week because her cough worsens when lying down flat.   Prior to this she slept with 3 pillows at night. Does endorse occasional night sweats although, she states that this is a chronic issue.  Denies any  "headaches, dizziness, chest pain, palpitations, abdominal pain, nausea, vomiting, diarrhea, lower extremity edema.     Of note, patient does not have a PCP and has not seen a physician in many years.   She was not currently on any medications. She does have a history of the benign lipoma for which she followed with ENT.  In 2022 she did have an episode of angioedema after taking Augmentin which required 5 days of intubation.     In the ED,  /73, pulse 89, respirations 20, oxygen saturation 96% on room air, temperature 94° F.  Significant labs:  WBC 13.42, D-dimer 1.36, electrolytes within normal limits, troponin 0.013, lactate 1.3, BNP 30.9, COVID/flu / RSV negative, UA with 2+ protein, 1+ occult blood.  Chest x-ray revealed mild interstitial prominence reflecting chronic change vs  pulmonary vascular congestion.  CTA revealed no evidence of PE, possible developing infectious/inflammatory process  in middle lobe. She was given DuoNeb, prednisone 60 mg, Levaquin 750 mg.  Internal medicine consulted for admission.    Hospital Course with Pertinent Findings     On admission patient presented 94% on 2 L nasal cannula, otherwise vitals stable, afebrile.  No acute events overnight, patient remained above 92% sats.  Patient states she felt much better this morning after nebulizers, antibiotics, steroids. Blood cultures no growth at 24 hrs.  Respiratory culture in process.  Patient was re-evaluated in the afternoon and was able to ambulate around on room air and remained above 92% saturation.  Prescription was given for DuoNebs, Levaquin, prednisone.  ED precautions were given.  Patient verbalized understanding.  On discharge patient was able to tolerate p.o. and ambulate without any difficulty.      Discharge physical exam:  Vitals  BP: 136/74  Temp: 98.8 °F (37.1 °C)  Temp Source: Oral  Pulse: 81  Resp: 18  SpO2: (!) 93 %  Height: 5' 1" (154.9 cm)  Weight: 75.4 kg (166 lb 3.6 oz)    Physical Exam  Constitutional:  "      Appearance: Normal appearance.   Cardiovascular:      Rate and Rhythm: Normal rate and regular rhythm.   Pulmonary:      Effort: Pulmonary effort is normal. No respiratory distress.      Breath sounds: Normal breath sounds.   Skin:     General: Skin is warm.   Neurological:      Mental Status: She is alert.         TIME SPENT ON DISCHARGE: 60 minutes    Discharge Medications        Medication List        START taking these medications      albuterol-ipratropium 2.5 mg-0.5 mg/3 mL nebulizer solution  Commonly known as: DUO-NEB  Take 3 mLs by nebulization every 4 (four) hours. Rescue     levoFLOXacin 750 MG tablet  Commonly known as: LEVAQUIN  Take 1 tablet (750 mg total) by mouth once daily.  Start taking on: January 19, 2024     predniSONE 20 MG tablet  Commonly known as: DELTASONE  Take 1 tablet (20 mg total) by mouth once daily.  Start taking on: January 19, 2024               Where to Get Your Medications        These medications were sent to [id number] - [place] - [address]  [address] [address]      Phone: [phone]   albuterol-ipratropium 2.5 mg-0.5 mg/3 mL nebulizer solution  levoFLOXacin 750 MG tablet  predniSONE 20 MG tablet         Discharge Information:     Complete all medications as prescribed.     ED precautions discussed including but not limited to worsening shortness of breath, chest pain, dizziness, fever     Maintain the following appointments:   Follow-up Information       [facility]. Call in 2 week(s).    Specialty: Internal Medicine  Contact information:  [address] [facility] [address] [phone]             Esau Centeno MD. Schedule an appointment as soon as possible for a visit.    Specialty: Family Medicine  Contact information:  [address] [place] [address] [phone]                             Mariano Luo MD  [facility], [facility]

## 2024-01-19 NOTE — PT/OT/SLP PROGRESS
POST DISCHARGE DOCUMENTATION - 01/19/2024 7:55 AM    Physical Therapy    Missed Treatment Session    Patient Name:  Glenda Huval Dance   MRN:  [id number]      -PT Orders [id number]/18/2024  -patient discharged from [id number]/18/2024  -PT CITLALIAL carlos 0755-01/19/2024  -patient not seen at this time secondary to patient discharged prior to initiation of evaluation

## 2024-01-19 NOTE — PROGRESS NOTES
01/19/24 0735   Missed Time Reason   Missed Treatment Reason Patient discharged prior to initiation of evaluation

## 2024-01-20 ENCOUNTER — PATIENT MESSAGE (OUTPATIENT)
Dept: ADMINISTRATIVE | Facility: OTHER | Age: 54
End: 2024-01-20
Payer: MEDICAID

## 2024-01-20 LAB
BACTERIA SPT CULT: NORMAL
GRAM STN SPEC: NORMAL

## 2024-01-22 ENCOUNTER — PATIENT MESSAGE (OUTPATIENT)
Dept: ADMINISTRATIVE | Facility: OTHER | Age: 54
End: 2024-01-22
Payer: MEDICAID

## 2024-01-22 LAB
BACTERIA BLD CULT: NORMAL
BACTERIA BLD CULT: NORMAL

## 2024-01-24 NOTE — PHYSICIAN QUERY
PT Name: Glenda Huval Dance  MR #: [id number]     DOCUMENTATION CLARIFICATION     CDS/: Irena Gibbs               Contact information:[email or web address]  This form is a permanent document in the medical record.     Query Date: January 24, 2024    By submitting this query, we are merely seeking further clarification of documentation.  Please utilize your independent clinical judgment when addressing the question(s) below.  The Medical Record contains the following:  Indicators Supporting Clinical Findings Location in Medical Record   X HR         RR          BP        Temp 89      20    161/73   ED provider note 01/17/2024   X Lactic Acid          Procalcitonin Lactic acid - 1.3 ED provider note 01/17/2024   X WBC           Bands          CRP  WBC - 13.42 ED provider note 01/17/2024    Culture(s)      AMS, Confusion, LOC, etc.      Organ Dysfunction/Failure     X Bacteremia or Sepsis / Septic Sepsis - SIRS 2/4 (respirations, WBC) H&P note 01/17/2024    Known or Suspected Source of Infection documented      (Failed) Outpatient Treatment     X Medication Levaquin  H&P note 01/17/2024    Treatment      Other          Provider, please specify diagnosis or diagnoses associated with above clinical findings.  [   ] Sepsis due to unknown organism   [ x  ] Sepsis due to (suspected) organism (please specify): __________   [   ] Severe Sepsis with Acute Organ Dysfunction/Failure (please specify organ dysfunction/failure): _______   [   ] Sepsis with Septic Shock   [   ] SIRS without infectious etiology   [   ] SIRS with infection but without Sepsis   [   ] Other Infectious Disease (please specify): __________   [   ] Sepsis Ruled Out   [  ] Clinically Undetermined       Please document in your progress notes daily for the duration of treatment until resolved and include in your discharge summary.

## 2024-01-29 ENCOUNTER — PATIENT MESSAGE (OUTPATIENT)
Dept: ADMINISTRATIVE | Facility: OTHER | Age: 54
End: 2024-01-29
Payer: MEDICAID

## 2024-02-03 ENCOUNTER — PATIENT MESSAGE (OUTPATIENT)
Dept: ADMINISTRATIVE | Facility: OTHER | Age: 54
End: 2024-02-03
Payer: MEDICAID

## 2024-02-08 ENCOUNTER — PATIENT MESSAGE (OUTPATIENT)
Dept: ADMINISTRATIVE | Facility: OTHER | Age: 54
End: 2024-02-08
Payer: MEDICAID

## 2024-02-14 ENCOUNTER — PATIENT MESSAGE (OUTPATIENT)
Dept: ADMINISTRATIVE | Facility: OTHER | Age: 54
End: 2024-02-14
Payer: MEDICAID

## 2024-02-21 ENCOUNTER — HOSPITAL ENCOUNTER (EMERGENCY)
Facility: HOSPITAL | Age: 54
Discharge: HOME | End: 2024-02-21
Attending: INTERNAL MEDICINE
Payer: MEDICAID

## 2024-02-21 VITALS
OXYGEN SATURATION: 95 % | TEMPERATURE: 99 F | DIASTOLIC BLOOD PRESSURE: 94 MMHG | SYSTOLIC BLOOD PRESSURE: 161 MMHG | BODY MASS INDEX: 31.37 KG/M2 | RESPIRATION RATE: 18 BRPM | WEIGHT: 166 LBS | HEART RATE: 58 BPM

## 2024-02-21 DIAGNOSIS — R07.9 CHEST PAIN: ICD-10-CM

## 2024-02-21 DIAGNOSIS — R03.0 ELEVATED BLOOD PRESSURE READING: ICD-10-CM

## 2024-02-21 DIAGNOSIS — R51.9 RIGHT-SIDED HEADACHE: Primary | ICD-10-CM

## 2024-02-21 DIAGNOSIS — R07.9 INTERMITTENT CHEST PAIN: ICD-10-CM

## 2024-02-21 LAB
ALBUMIN SERPL-MCNC: 3.9 G/DL (ref 3.5–5)
ALBUMIN/GLOB SERPL: 1.3 RATIO (ref 1.1–2)
ALP SERPL-CCNC: 62 UNIT/L (ref 40–150)
ALT SERPL-CCNC: 26 UNIT/L (ref 0–55)
AST SERPL-CCNC: 22 UNIT/L (ref 5–34)
BASOPHILS # BLD AUTO: 0.05 X10(3)/MCL
BASOPHILS NFR BLD AUTO: 0.6 %
BILIRUB SERPL-MCNC: 0.3 MG/DL
BNP BLD-MCNC: 38.6 PG/ML
BUN SERPL-MCNC: 17.5 MG/DL (ref 9.8–20.1)
CALCIUM SERPL-MCNC: 9.2 MG/DL (ref 8.4–10.2)
CHLORIDE SERPL-SCNC: 110 MMOL/L (ref 98–107)
CO2 SERPL-SCNC: 23 MMOL/L (ref 22–29)
CREAT SERPL-MCNC: 0.78 MG/DL (ref 0.55–1.02)
EOSINOPHIL # BLD AUTO: 0.19 X10(3)/MCL (ref 0–0.9)
EOSINOPHIL NFR BLD AUTO: 2.1 %
ERYTHROCYTE [DISTWIDTH] IN BLOOD BY AUTOMATED COUNT: 12 % (ref 11.5–17)
GFR SERPLBLD CREATININE-BSD FMLA CKD-EPI: >60 MLS/MIN/1.73/M2
GLOBULIN SER-MCNC: 3 GM/DL (ref 2.4–3.5)
GLUCOSE SERPL-MCNC: 102 MG/DL (ref 74–100)
HCT VFR BLD AUTO: 38.5 % (ref 37–47)
HGB BLD-MCNC: 13.5 G/DL (ref 12–16)
HOLD SPECIMEN: NORMAL
IMM GRANULOCYTES # BLD AUTO: 0.03 X10(3)/MCL (ref 0–0.04)
IMM GRANULOCYTES NFR BLD AUTO: 0.3 %
LYMPHOCYTES # BLD AUTO: 3.02 X10(3)/MCL (ref 0.6–4.6)
LYMPHOCYTES NFR BLD AUTO: 33.7 %
MAGNESIUM SERPL-MCNC: 1.8 MG/DL (ref 1.6–2.6)
MCH RBC QN AUTO: 33.1 PG (ref 27–31)
MCHC RBC AUTO-ENTMCNC: 35.1 G/DL (ref 33–36)
MCV RBC AUTO: 94.4 FL (ref 80–94)
MONOCYTES # BLD AUTO: 0.59 X10(3)/MCL (ref 0.1–1.3)
MONOCYTES NFR BLD AUTO: 6.6 %
NEUTROPHILS # BLD AUTO: 5.08 X10(3)/MCL (ref 2.1–9.2)
NEUTROPHILS NFR BLD AUTO: 56.7 %
NRBC BLD AUTO-RTO: 0 %
OHS QRS DURATION: 146 MS
OHS QTC CALCULATION: 445 MS
PLATELET # BLD AUTO: 320 X10(3)/MCL (ref 130–400)
PMV BLD AUTO: 9.4 FL (ref 7.4–10.4)
POTASSIUM SERPL-SCNC: 4.5 MMOL/L (ref 3.5–5.1)
PROT SERPL-MCNC: 6.9 GM/DL (ref 6.4–8.3)
RBC # BLD AUTO: 4.08 X10(6)/MCL (ref 4.2–5.4)
SODIUM SERPL-SCNC: 141 MMOL/L (ref 136–145)
TROPONIN I SERPL-MCNC: <0.01 NG/ML (ref 0–0.04)
WBC # SPEC AUTO: 8.96 X10(3)/MCL (ref 4.5–11.5)

## 2024-02-21 PROCEDURE — 84484 ASSAY OF TROPONIN QUANT: CPT | Performed by: PHYSICIAN ASSISTANT

## 2024-02-21 PROCEDURE — 83735 ASSAY OF MAGNESIUM: CPT | Performed by: PHYSICIAN ASSISTANT

## 2024-02-21 PROCEDURE — 83880 ASSAY OF NATRIURETIC PEPTIDE: CPT | Performed by: PHYSICIAN ASSISTANT

## 2024-02-21 PROCEDURE — 85025 COMPLETE CBC W/AUTO DIFF WBC: CPT | Performed by: PHYSICIAN ASSISTANT

## 2024-02-21 PROCEDURE — 99285 EMERGENCY DEPT VISIT HI MDM: CPT | Mod: 25

## 2024-02-21 PROCEDURE — 63600175 PHARM REV CODE 636 W HCPCS: Performed by: PHYSICIAN ASSISTANT

## 2024-02-21 PROCEDURE — 80053 COMPREHEN METABOLIC PANEL: CPT | Performed by: PHYSICIAN ASSISTANT

## 2024-02-21 PROCEDURE — 96372 THER/PROPH/DIAG INJ SC/IM: CPT | Performed by: PHYSICIAN ASSISTANT

## 2024-02-21 PROCEDURE — 93005 ELECTROCARDIOGRAM TRACING: CPT

## 2024-02-21 RX ORDER — METOCLOPRAMIDE HYDROCHLORIDE 5 MG/ML
10 INJECTION INTRAMUSCULAR; INTRAVENOUS
Status: COMPLETED | OUTPATIENT
Start: 2024-02-21 | End: 2024-02-21

## 2024-02-21 RX ORDER — KETOROLAC TROMETHAMINE 30 MG/ML
30 INJECTION, SOLUTION INTRAMUSCULAR; INTRAVENOUS
Status: COMPLETED | OUTPATIENT
Start: 2024-02-21 | End: 2024-02-21

## 2024-02-21 RX ORDER — BUTALBITAL, ACETAMINOPHEN AND CAFFEINE 50; 325; 40 MG/1; MG/1; MG/1
1 TABLET ORAL EVERY 6 HOURS PRN
Qty: 20 TABLET | Refills: 0 | Status: SHIPPED | OUTPATIENT
Start: 2024-02-21 | End: 2024-03-06 | Stop reason: ALTCHOICE

## 2024-02-21 RX ADMIN — KETOROLAC TROMETHAMINE 30 MG: 30 INJECTION, SOLUTION INTRAMUSCULAR; INTRAVENOUS at 10:02

## 2024-02-21 RX ADMIN — METOCLOPRAMIDE 10 MG: 5 INJECTION, SOLUTION INTRAMUSCULAR; INTRAVENOUS at 10:02

## 2024-02-21 NOTE — ED PROVIDER NOTES
Encounter Date: 2024       History     Chief Complaint   Patient presents with    Chest Pain     Chest pain/tightness x2 months, pt reports admitted one month ago for pneumonia. +SOB.Denies cardiac hx. Pt reports increase in intake of BC powder     Glenda Huval Dance is a 53 y.o. female with a history of sensorineural hearing loss and COPD who presents to the ED complaining of a headache. She reports a throbbing right sided headache with associated nausea. Reports excessive tearing of her eyes as well. She has had this headache intermittently for years, but current episode has been constant for almost a month. She has been taking extra strength ibuprofen and BC powder which helps, but she is tired of having to take it every day. She denies neck stiffness, vision changes, photophobia, phonophobia.     Of note pt states she has also had chest tightness and intermittent shortness of breath since being admitted to the hospital with pneumonia last month. However, states that her symptoms are overall improved. Chest pain occurs with exertion. Last episode was over the weekend when she was walking around outside. Resolved with rest. States she has an exercise stress test years ago that was normal. No known history of heart disease. She does not have a PCP and has not had a follow up appointment since being discharged from hospital. She denies chest pain or SOB at present, N/V/D, abdominal pain.     The history is provided by the patient.     Review of patient's allergies indicates:   Allergen Reactions    Penicillins Anaphylaxis and Swelling     Past Medical History:   Diagnosis Date    Sensorineural hearing loss (SNHL) of both ears      Past Surgical History:   Procedure Laterality Date     SECTION  2001    EAR RECONSTRUCTION Right 2012    HYSTERECTOMY       No family history on file.  Social History     Tobacco Use    Smoking status: Every Day     Current packs/day: 1.00     Average packs/day: 1 pack/day for  43.0 years (43.0 ttl pk-yrs)     Types: Cigarettes    Smokeless tobacco: Never   Substance Use Topics    Alcohol use: Not Currently     Comment: occasionally    Drug use: Yes     Types: Marijuana     Comment: 3 days ago     Review of Systems   Constitutional:  Negative for chills and fever.   HENT:  Negative for congestion and sore throat.    Eyes:  Positive for discharge. Negative for redness and itching.   Respiratory:  Negative for cough and shortness of breath.    Cardiovascular:  Negative for chest pain and leg swelling.   Gastrointestinal:  Negative for abdominal pain and nausea.   Genitourinary:  Negative for dysuria and frequency.   Musculoskeletal:  Negative for arthralgias and back pain.   Skin:  Negative for rash and wound.   Neurological:  Positive for headaches. Negative for weakness.   Hematological:  Does not bruise/bleed easily.   Psychiatric/Behavioral:  Negative for agitation and confusion.        Physical Exam     Initial Vitals [02/21/24 0930]   BP Pulse Resp Temp SpO2   (!) 198/88 69 18 98.6 °F (37 °C) 98 %      MAP       --         Physical Exam    Nursing note and vitals reviewed.  Constitutional: She appears well-developed and well-nourished. No distress.   HENT:   Head: Normocephalic and atraumatic.   Mouth/Throat: No oropharyngeal exudate.   Eyes: EOM are normal. No scleral icterus.   Neck: Neck supple.   Normal range of motion.  Cardiovascular:  Normal rate and regular rhythm.           No murmur heard.  Pulmonary/Chest: Breath sounds normal. No respiratory distress. She has no wheezes.   Abdominal: Abdomen is soft. Bowel sounds are normal. She exhibits no distension. There is no abdominal tenderness.   Musculoskeletal:         General: No tenderness. Normal range of motion.      Cervical back: Normal range of motion and neck supple.     Neurological: She is alert and oriented to person, place, and time. No cranial nerve deficit.   Skin: Skin is warm and dry. Capillary refill takes less  than 2 seconds. No erythema.   Psychiatric: She has a normal mood and affect. Her behavior is normal. Judgment and thought content normal.         ED Course   Procedures  Labs Reviewed   COMPREHENSIVE METABOLIC PANEL - Abnormal; Notable for the following components:       Result Value    Chloride 110 (*)     Glucose Level 102 (*)     All other components within normal limits   CBC WITH DIFFERENTIAL - Abnormal; Notable for the following components:    RBC 4.08 (*)     MCV 94.4 (*)     MCH 33.1 (*)     All other components within normal limits   B-TYPE NATRIURETIC PEPTIDE - Normal   TROPONIN I - Normal   MAGNESIUM - Normal   CBC W/ AUTO DIFFERENTIAL    Narrative:     The following orders were created for panel order CBC auto differential.  Procedure                               Abnormality         Status                     ---------                               -----------         ------                     CBC with Differential[[id number]]       Abnormal            Final result                 Please view results for these tests on the individual orders.   EXTRA TUBES    Narrative:     The following orders were created for panel order EXTRA TUBES.  Procedure                               Abnormality         Status                     ---------                               -----------         ------                     Light Blue Top Hold[[id number]]                             Final result               Gold Top Hold[[id number]]                                   Final result               Pink Top Hold[[id number]]                                   Final result                 Please view results for these tests on the individual orders.   LIGHT BLUE TOP HOLD   GOLD TOP HOLD   PINK TOP HOLD        ECG Results              EKG 12-lead (In process)        Collection Time Result Time QRS Duration [facility] QTC Calculation    02/21/24 09:57:06 02/21/24 10:01:33 146 445                     In process by Interface, Lab In  [facility] (02/21/24 10:01:44)                   Narrative:    Test Reason : R07.9,    Vent. Rate : 055 BPM     Atrial Rate : 055 BPM     P-R Int : 176 ms          QRS Dur : 146 ms      QT Int : 466 ms       P-R-T Axes : 067 -04 105 degrees     QTc Int : 445 ms    Sinus bradycardia  Left bundle branch block  Abnormal ECG  No previous ECGs available    Referred By: [email or web address]   SELF           Confirmed By:                                   Imaging Results              X-Ray Chest AP Portable (Final result)  Result time 02/21/24 10:47:32      Final result by Davion Díaz MD (02/21/24 10:47:32)                   Impression:      Increase interstitial markings.    Otherwise no active pulmonary disease and no significant change as compared with the previous exam      Electronically signed by: Davion Díaz  Date:    02/21/2024  Time:    10:47               Narrative:    EXAMINATION:  XR CHEST AP PORTABLE    CLINICAL HISTORY:  Shortness of breath    TECHNIQUE:  Single frontal view of the chest was performed.    COMPARISON:  January 17, 2024    FINDINGS:  Examination reveals mediastinal silhouette to be within normal limits cardiac silhouette is not enlarged persistent increase interstitial markings similar to previous exam with no new focal consolidative changes.    Calcified lymph nodes again identified.    No focal consolidative changes                                       Medications   ketorolac injection 30 mg (30 mg Intramuscular Given 2/21/24 1028)   metoclopramide injection 10 mg (10 mg Intramuscular Given 2/21/24 1028)     Medical Decision Making  Initial assessment: resting comfortably in NAD. HDS and afebrile. Lungs CTA bilaterally. RRR, no murmurs.     Differential diagnosis: migraine headache, cluster headache, tension headache, among others    Clinical tests/ED management: hypertensive on arrival at 198/88. No history of HTN and not on anti-hypertensive medication. Lungs CTA bilaterally. No active  chest pain or SOB. EKG with LBB, stable TWI in comparison to previous EKG in January. No acute ischemic changes. Troponin negative. Pt only risk factor is current smoker. BP improved with pain control. Headache resolved with toradol and reglan. Discussed with Dr. Meza. Pt stable for discharge home with close follow up with PCP. Will order outpatient exercise stress test. Appointment scheduled so that she can establish care with PCP in internal medicine clinic. Strict ED return precautions given for any new or worsening symptoms. She verbalized understanding. All questions answered.     Amount and/or Complexity of Data Reviewed  Labs: ordered.  Radiology: ordered. Decision-making details documented in ED Course.  ECG/medicine tests:  Decision-making details documented in ED Course.    Risk  Prescription drug management.      Additional MDM:   Heart Score:    History:          Slightly suspicious.  ECG:             Nonspecific repolarisation disturbance  Age:               45-65 years  Risk factors: 1-2 risk factors  Troponin:       Less than or equal to normal limit  Heart Score = 3                ED Course as of 02/21/24 1113   Wed Feb 21, 2024   1000 EKG 12-lead  LBBB, which is seen on previous EKG. No acute ischemic changes in comparison to prior.  [KD]   1055 X-Ray Chest AP Portable  Increase interstitial markings.     Otherwise no active pulmonary disease and no significant change as compared with the previous exam [KD]   1055 BP(!): 161/94 [KD]   1104 Pt re-evaluated at this time. Reports improvement in headache. BP improved with pain control.  [KD]      ED Course User Index  [KD] Gia Berrios, PAIDA                             Clinical Impression:  Final diagnoses:  [R51.9] Right-sided headache (Primary)  [R07.9] Intermittent chest pain  [R03.0] Elevated blood pressure reading          ED Disposition Condition    Discharge Stable          ED Prescriptions       Medication Sig Dispense Start Date End Date  Auth. Provider    butalbital-acetaminophen-caffeine -40 mg (FIORICET, ESGIC) -40 mg per tablet Take 1 tablet by mouth every 6 (six) hours as needed for Headaches. 20 tablet 2/21/2024 2/26/2024 Gia Berrios PA-C          Follow-up Information       Follow up With Specialties Details Why Contact Info    Apple Rodriguez FNP Nurse Practitioner On 3/6/2024 to establish care [address] [facility]  Internal Medicine Clinic  [facility] [address]  [phone]      [facility] Emergency Medicine  If symptoms worsen [facility] [facility] [id number] [phone]             Gia Berrios PA-C  02/21/24 1113

## 2024-03-06 ENCOUNTER — OFFICE VISIT (OUTPATIENT)
Dept: INTERNAL MEDICINE | Facility: CLINIC | Age: 54
End: 2024-03-06
Payer: MEDICAID

## 2024-03-06 VITALS
RESPIRATION RATE: 22 BRPM | SYSTOLIC BLOOD PRESSURE: 148 MMHG | WEIGHT: 177.63 LBS | TEMPERATURE: 99 F | BODY MASS INDEX: 33.54 KG/M2 | HEART RATE: 61 BPM | DIASTOLIC BLOOD PRESSURE: 78 MMHG | HEIGHT: 61 IN

## 2024-03-06 DIAGNOSIS — R03.0 ELEVATED BP WITHOUT DIAGNOSIS OF HYPERTENSION: ICD-10-CM

## 2024-03-06 DIAGNOSIS — E66.09 CLASS 1 OBESITY DUE TO EXCESS CALORIES WITH SERIOUS COMORBIDITY AND BODY MASS INDEX (BMI) OF 33.0 TO 33.9 IN ADULT: Primary | ICD-10-CM

## 2024-03-06 DIAGNOSIS — Z12.11 SCREENING FOR MALIGNANT NEOPLASM OF COLON: ICD-10-CM

## 2024-03-06 DIAGNOSIS — Z12.31 ENCOUNTER FOR SCREENING MAMMOGRAM FOR MALIGNANT NEOPLASM OF BREAST: ICD-10-CM

## 2024-03-06 DIAGNOSIS — H90.6 MIXED CONDUCTIVE AND SENSORINEURAL HEARING LOSS OF BOTH EARS: ICD-10-CM

## 2024-03-06 DIAGNOSIS — R51.9 WORSENING HEADACHES: ICD-10-CM

## 2024-03-06 DIAGNOSIS — R06.02 SOBOE (SHORTNESS OF BREATH ON EXERTION): ICD-10-CM

## 2024-03-06 DIAGNOSIS — Z72.0 TOBACCO ABUSE: ICD-10-CM

## 2024-03-06 DIAGNOSIS — Z01.419 WELL WOMAN EXAM: ICD-10-CM

## 2024-03-06 PROBLEM — E66.811 CLASS 1 OBESITY DUE TO EXCESS CALORIES WITH SERIOUS COMORBIDITY AND BODY MASS INDEX (BMI) OF 33.0 TO 33.9 IN ADULT: Chronic | Status: ACTIVE | Noted: 2024-03-06

## 2024-03-06 PROCEDURE — 1159F MED LIST DOCD IN RCRD: CPT | Mod: CPTII,,, | Performed by: NURSE PRACTITIONER

## 2024-03-06 PROCEDURE — 3044F HG A1C LEVEL LT 7.0%: CPT | Mod: CPTII,,, | Performed by: NURSE PRACTITIONER

## 2024-03-06 PROCEDURE — 1160F RVW MEDS BY RX/DR IN RCRD: CPT | Mod: CPTII,,, | Performed by: NURSE PRACTITIONER

## 2024-03-06 PROCEDURE — 3078F DIAST BP <80 MM HG: CPT | Mod: CPTII,,, | Performed by: NURSE PRACTITIONER

## 2024-03-06 PROCEDURE — 99215 OFFICE O/P EST HI 40 MIN: CPT | Mod: PBBFAC | Performed by: NURSE PRACTITIONER

## 2024-03-06 PROCEDURE — 3008F BODY MASS INDEX DOCD: CPT | Mod: CPTII,,, | Performed by: NURSE PRACTITIONER

## 2024-03-06 PROCEDURE — 3077F SYST BP >= 140 MM HG: CPT | Mod: CPTII,,, | Performed by: NURSE PRACTITIONER

## 2024-03-06 PROCEDURE — 99204 OFFICE O/P NEW MOD 45 MIN: CPT | Mod: 25,S$PBB,, | Performed by: NURSE PRACTITIONER

## 2024-03-06 RX ORDER — TOPIRAMATE 25 MG/1
TABLET ORAL
Qty: 60 TABLET | Refills: 1 | Status: SHIPPED | OUTPATIENT
Start: 2024-03-06 | End: 2024-04-15

## 2024-03-06 RX ORDER — ALBUTEROL SULFATE 90 UG/1
1 AEROSOL, METERED RESPIRATORY (INHALATION) EVERY 4 HOURS PRN
Qty: 18 G | Refills: 1 | Status: SHIPPED | OUTPATIENT
Start: 2024-03-06 | End: 2024-05-06

## 2024-03-06 RX ORDER — SUMATRIPTAN 50 MG/1
25 TABLET, FILM COATED ORAL DAILY PRN
Qty: 9 TABLET | Refills: 1 | Status: SHIPPED | OUTPATIENT
Start: 2024-03-06

## 2024-03-06 NOTE — PROGRESS NOTES
Apple Rodriguez, FLORESITA   [facility]  [address] [facility] [address]      PATIENT NAME: Glenda Huval Dance  : 1970  DATE: 3/6/24  MRN: [id number]      Reason for Visit / Chief Complaint: Establish Care       History of Present Illness / Problem Focused Workflow     Glenda Huval Dance is a 53 y.o. White female presents to the clinic to establish primary care. PMH mixed conductive and SNHL bilaterally, lipoma of neck, tobacco abuse (quit 2024-40 pk yr), CAP and obesity.     Pt presents to establish primary care. States has never had PCP. Had inpt admit in  for CAP. Pt completed levaquin and prednisone as ordered. Admits to chronic cough and SOBOE; cannot complete home chores without stopping to catch her breath. Pt reported to ED on 24 for worsening throbbing, shooting right sided headaches, initial onset a couple of years which have worsened in frequency and intensity, with associated sensitivity to light and nausea, that radiate to the back of her head, unrelieved with BC powder q2 hrs. States fioricet prescribed was also ineffective. Also reports ongoing bilateral hearing loss; was previously followed by ENT. Last visit 2022. BP borderline today. Does not follow low sodium diet. Last MMG was 2018; last GYN visit was 20 yrs ago with childbirth. Quit smoking in 2024. Declines vaccines today. Denies chest pain, fever, dizziness, weakness, abdominal pain, vomiting, diarrhea, constipation, dysuria, depression, anxiety, SI/HI.    Review of Systems     Review of Systems     See HPI for details    Constitutional: Denies Change in appetite. Denies Chills. Denies Fever. Denies Night sweats.  Eye: Denies Blurred vision. Denies Discharge. Denies Eye pain.  ENT: Admits Decreased hearing. Denies Sore throat. Denies Swollen glands.  Respiratory: Admits chronic Cough. Denies Shortness of breath. Admits Shortness of breath with exertion.  Denies Wheezing.  Cardiovascular: Denies Chest pain at rest. Denies Chest pain with exertion. Denies Irregular heartbeat. Denies Palpitations. Denies Edema.  Gastrointestinal: Denies Abdominal pain. Denies Diarrhea. Admits Nausea. Denies Vomiting. Denies Hematemesis or Hematochezia.  Genitourinary: Denies Dysuria. Denies Urinary frequency. Denies Urinary urgency. Denies Blood in urine.  Endocrine: Denies Cold intolerance. Denies Excessive thirst. Denies Heat intolerance. Denies Weight loss. Denies Weight gain.  Musculoskeletal: Denies Painful joints. Denies Weakness.  Integumentary: Denies Rash. Denies Itching. Denies Dry skin.  Neurologic: Denies Dizziness. Denies Fainting. Admits worsening Headache.  Psychiatric: Denies Depression. Denies Anxiety. Denies Suicidal/Homicidal ideations.    All Other ROS: Negative except as stated in HPI.     Medical / Surgical / Social / Family History       ----------------------------  Sensorineural hearing loss (SNHL) of both ears     Past Surgical History:   Procedure Laterality Date     SECTION  2001    EAR RECONSTRUCTION Right 2012    HYSTERECTOMY         Social History     Socioeconomic History    Marital status:    Tobacco Use    Smoking status: Former     Current packs/day: 0.00     Average packs/day: 1 pack/day for 43.0 years (43.0 ttl pk-yrs)     Types: Cigarettes     Quit date: 1/15/2024     Years since quittin.1    Smokeless tobacco: Never   Substance and Sexual Activity    Alcohol use: Not Currently     Comment: occasionally    Drug use: Yes     Frequency: 1.0 times per week     Types: Marijuana     Comment: Sometimes at night  to sleep    Sexual activity: Yes     Partners: Male     Birth control/protection: None     Social Determinants of Health     Financial Resource Strain: Medium Risk (2024)    Overall Financial Resource Strain (CARDIA)     Difficulty of Paying Living Expenses: Somewhat hard   Food Insecurity: No Food Insecurity (2024)     Hunger Vital Sign     Worried About Running Out of Food in the Last Year: Never true     Ran Out of Food in the Last Year: Never true   Transportation Needs: No Transportation Needs (1/18/2024)    PRAPARE - Transportation     Lack of Transportation (Medical): No     Lack of Transportation (Non-Medical): No   Physical Activity: Inactive (1/18/2024)    Exercise Vital Sign     Days of Exercise per Week: 0 days     Minutes of Exercise per Session: 0 min   Stress: Stress Concern Present (1/18/2024)    [place] [employer] - Occupational Stress Questionnaire     Feeling of Stress : To some extent   Social Connections: Moderately Integrated (1/18/2024)    Social Connection and Isolation Panel [NHANES]     Frequency of Communication with Friends and Family: More than three times a week     Frequency of Social Gatherings with Friends and Family: More than three times a week     Attends [employer] Services: 1 to 4 times per year     Active Member of Clubs or Organizations: No     Marital Status: Living with partner   Housing Stability: Low Risk  (1/18/2024)    Housing Stability Vital Sign     Unable to Pay for Housing in the Last Year: No     Number of Places Lived in the Last Year: 1     Unstable Housing in the Last Year: No        Family History   Problem Relation Age of Onset    Cancer Sister         Medications and Allergies     Medications  Current Outpatient Medications   Medication Instructions    albuterol (VENTOLIN HFA) 90 mcg/actuation inhaler 1 puff, Inhalation, Every 4 hours PRN, Rescue    albuterol-ipratropium (DUO-NEB) 2.5 mg-0.5 mg/3 mL nebulizer solution 3 mLs, Nebulization, Every 4 hours, Rescue    sumatriptan (IMITREX) 25 mg, Oral, Daily PRN, May repeat x 1 dose in 2 hours if ineffective.    topiramate (TOPAMAX) 25 MG tablet Take one tablet at bedtime for 7 days then increase to BID.         Allergies  Review of patient's allergies indicates:   Allergen Reactions    Penicillins  "Anaphylaxis and Swelling       Physical Examination     BP (!) 148/78   Pulse 61   Temp 99.3 °F (37.4 °C) Comment: states had hot coffee  Resp (!) 22   Ht 5' 1" (1.549 m)   Wt 80.6 kg (177 lb 9.6 oz)   BMI 33.56 kg/m²     Physical Exam  Constitutional:       Appearance: She is obese.   Pulmonary:      Breath sounds: Examination of the right-upper field reveals rhonchi. Examination of the left-upper field reveals rhonchi. Examination of the right-middle field reveals rhonchi. Examination of the left-middle field reveals rhonchi. Examination of the right-lower field reveals rhonchi. Examination of the left-lower field reveals rhonchi. Rhonchi present.         General: Alert and oriented, No acute distress.  Head: Normocephalic, Atraumatic.  Eye: Pupils are equal, round and reactive to light, Extraocular movements are intact, Sclera non-icteric.  Neck/Thyroid: Supple, Non-tender, No carotid bruit, No lymphadenopathy, No JVD, Full range of motion.  Respiratory: Clear to auscultation bilaterally; No wheezes or rales,Non-labored respirations, Symmetrical chest wall expansion.  Cardiovascular: Regular rate and rhythm, S1/S2 normal, No murmurs, rubs or gallops.  Gastrointestinal: Soft, Non-tender, Non-distended, Normal bowel sounds, No palpable organomegaly.  Musculoskeletal: Normal range of motion.  Integumentary: Warm, Dry, Intact, No suspicious lesions or rashes.  Extremities: No clubbing, cyanosis or edema  Neurologic: No focal deficits, Cranial Nerves II-XII are grossly intact, Motor strength normal upper and lower extremities, Sensory exam intact.  Psychiatric: Normal interaction, Coherent speech, Appropriate affect       Results     Lab Results   Component Value Date    WBC 8.96 02/21/2024    HGB 13.5 02/21/2024    HCT 38.5 02/21/2024     02/21/2024    ALT 26 02/21/2024    AST 22 02/21/2024     02/21/2024    K 4.5 02/21/2024    CREATININE 0.78 02/21/2024    BUN 17.5 02/21/2024    CO2 23 02/21/2024 "    TSH 1.520 02/07/2018    HGBA1C 5.3 01/18/2024         Assessment and Plan (including Health Maintenance)     Plan:     1. Elevated BP without diagnosis of hypertension  Will continue to monitor.   Follow a low sodium (less than 2 grams of sodium per day), DASH diet.   Continue medications as prescribed.  Monitor blood pressure and report any consistent values greater than 140/90 and keep a log.  Encouraged continued smoking cessation to aid in BP reduction and co-morbidities.   Maintain healthy weight with a BMI goal of <30.   Aerobic exercise for 150 minutes per week (or 5 days a week for 30 minutes each day).    - Microalbumin/Creatinine Ratio, Urine; Future    2. Mixed conductive and sensorineural hearing loss of both ears  Referral to audio to eval/treat.  Last ENT visit 7/2022.  - Ambulatory referral/consult to Audiology; Future    3. Worsening headaches  CT head ordered.  Rx topamax 25 mg at bedtime x 7 days then increase to BID.  Rx sumitriptan daily prn.  STOP daily BC powder use.  Take tylenol or IBU prn headaches.   Avoid triggers such as bright lights, alcohol, nicotine, aged cheeses, chocolate, caffeine, foods/drinks that contain nitrates or aspartame.  Take meds as prescribed.  Lie in a quiet, dark room if possible.  Limit stress and get at least 8 hours of sleep per night.    - CT Head Without Contrast; Future  - topiramate (TOPAMAX) 25 MG tablet; Take one tablet at bedtime for 7 days then increase to BID.  Dispense: 60 tablet; Refill: 1  - sumatriptan (IMITREX) 50 MG tablet; Take 0.5 tablets (25 mg total) by mouth daily as needed for Migraine. May repeat x 1 dose in 2 hours if ineffective.  Dispense: 9 tablet; Refill: 1    4. SOBOE (shortness of breath on exertion)  PFT ordered.  Rx ventolin inhaler prn.  ED precautions.  - Complete PFT w/ bronchodilator; Future  - albuterol (VENTOLIN HFA) 90 mcg/actuation inhaler; Inhale 1 puff into the lungs every 4 (four) hours as needed for Wheezing or  Shortness of Breath. Rescue  Dispense: 18 g; Refill: 1    5. Class 1 obesity due to excess calories with serious comorbidity and body mass index (BMI) of 33.0 to 33.9 in adult  BMI 33. Goal BMI <30.  Aerobic exercise 150 minutes per week.  Avoid soda, simple sugars, sweets, excessive rice, pasta, potatoes or bread.   Choose brown options when available and portion control.  Limit fast foods and fried foods.   Choose complex carbs in moderation (ex: green, leafy vegetables, beans, oatmeal).  Eat plenty of fresh fruits and vegetables with lean meats daily.   Consider permanent healthy lifestyle changes.    - Hemoglobin A1C; Future  - Lipid Panel; Future  - TSH; Future  - Urinalysis; Future    6. Tobacco abuse  Smoking cessation discussed; total time 3 mins.   Pt quit in 1/2024.  Encouraged and congratulated on continued cessation.  Discussed benefits of quitting including improved health, decreased cardiac/vascular/pulmonary/stroke risks as well as saving money.    - CT Chest Lung Screening Low Dose; Future  - Complete PFT w/ bronchodilator; Future    7. Encounter for screening mammogram for malignant neoplasm of breast  - Mammo Digital Screening Bilat w/ Faizan; Future    8. Screening for malignant neoplasm of colon  - Cologuard Screening (Multitarget Stool DNA); Future  - Cologuard Screening (Multitarget Stool DNA)      Problem List Items Addressed This Visit          ENT    Mixed conductive and sensorineural hearing loss of both ears    Relevant Orders    Ambulatory referral/consult to Audiology       Endocrine    Class 1 obesity due to excess calories with serious comorbidity and body mass index (BMI) of 33.0 to 33.9 in adult - Primary (Chronic)    Relevant Orders    Hemoglobin A1C    Lipid Panel    TSH    Urinalysis     Other Visit Diagnoses       Elevated BP without diagnosis of hypertension        Relevant Orders    Microalbumin/Creatinine Ratio, Urine    Encounter for screening mammogram for malignant neoplasm  of breast        Relevant Orders    Mammo Digital Screening Bilat w/ Faizan    Screening for malignant neoplasm of colon        Relevant Orders    Cologuard Screening (Multitarget Stool DNA)    Tobacco abuse        Relevant Orders    CT Chest Lung Screening Low Dose    Complete PFT w/ bronchodilator    SOBOE (shortness of breath on exertion)        Relevant Medications    albuterol (VENTOLIN HFA) 90 mcg/actuation inhaler    Other Relevant Orders    Complete PFT w/ bronchodilator    Worsening headaches        Relevant Medications    topiramate (TOPAMAX) 25 MG tablet    sumatriptan (IMITREX) 50 MG tablet    Other Relevant Orders    CT Head Without Contrast    Well woman exam        Relevant Orders    Ambulatory referral/consult to Gynecology              Health Maintenance Due   Topic Date Due    Hepatitis C Screening  Never done    Lipid Panel  Never done    COVID-19 Vaccine (1) Never done    Pneumococcal Vaccines (Age 0-64) (1 of 2 - PCV) Never done    HIV Screening  Never done    TETANUS VACCINE  Never done    Colorectal Cancer Screening  Never done    Shingles Vaccine (1 of 2) Never done    Mammogram  05/04/2019    Influenza Vaccine (1) 09/01/2023       Follow up in about 4 weeks (around 4/3/2024) for Follow up, Lab Results , BP Check.        Signature:  FLORESITA Jordan  [facility]  [address] [facility] [id number]

## 2024-03-07 ENCOUNTER — LAB VISIT (OUTPATIENT)
Dept: LAB | Facility: HOSPITAL | Age: 54
End: 2024-03-07
Attending: NURSE PRACTITIONER
Payer: MEDICAID

## 2024-03-07 DIAGNOSIS — R03.0 ELEVATED BP WITHOUT DIAGNOSIS OF HYPERTENSION: ICD-10-CM

## 2024-03-07 DIAGNOSIS — E66.09 CLASS 1 OBESITY DUE TO EXCESS CALORIES WITH SERIOUS COMORBIDITY AND BODY MASS INDEX (BMI) OF 33.0 TO 33.9 IN ADULT: ICD-10-CM

## 2024-03-07 LAB
APPEARANCE UR: CLEAR
BACTERIA #/AREA URNS AUTO: ABNORMAL /HPF
BILIRUB UR QL STRIP.AUTO: NEGATIVE
CHOLEST SERPL-MCNC: 277 MG/DL
CHOLEST/HDLC SERPL: 6 {RATIO} (ref 0–5)
COLOR UR AUTO: YELLOW
CREAT UR-MCNC: 140.7 MG/DL (ref 45–106)
EST. AVERAGE GLUCOSE BLD GHB EST-MCNC: 116.9 MG/DL
GLUCOSE UR QL STRIP.AUTO: NEGATIVE
HBA1C MFR BLD: 5.7 %
HDLC SERPL-MCNC: 43 MG/DL (ref 35–60)
KETONES UR QL STRIP.AUTO: NEGATIVE
LDLC SERPL CALC-MCNC: 194 MG/DL (ref 50–140)
LEUKOCYTE ESTERASE UR QL STRIP.AUTO: NEGATIVE
MICROALBUMIN UR-MCNC: 1271 UG/ML
MICROALBUMIN/CREAT RATIO PNL UR: 903.3 MG/GM CR (ref 0–30)
NITRITE UR QL STRIP.AUTO: NEGATIVE
PH UR STRIP.AUTO: 5.5 [PH]
PROT UR QL STRIP.AUTO: >=300
RBC #/AREA URNS AUTO: ABNORMAL /HPF
RBC UR QL AUTO: NEGATIVE
SP GR UR STRIP.AUTO: >=1.03 (ref 1–1.03)
SQUAMOUS #/AREA URNS AUTO: ABNORMAL /HPF
TRIGL SERPL-MCNC: 202 MG/DL (ref 37–140)
TSH SERPL-ACNC: 1.22 UIU/ML (ref 0.35–4.94)
UROBILINOGEN UR STRIP-ACNC: 0.2
VLDLC SERPL CALC-MCNC: 40 MG/DL
WBC #/AREA URNS AUTO: ABNORMAL /HPF

## 2024-03-07 PROCEDURE — 80061 LIPID PANEL: CPT

## 2024-03-07 PROCEDURE — 82043 UR ALBUMIN QUANTITATIVE: CPT

## 2024-03-07 PROCEDURE — 36415 COLL VENOUS BLD VENIPUNCTURE: CPT

## 2024-03-07 PROCEDURE — 84443 ASSAY THYROID STIM HORMONE: CPT

## 2024-03-07 PROCEDURE — 81003 URINALYSIS AUTO W/O SCOPE: CPT

## 2024-03-07 PROCEDURE — 83036 HEMOGLOBIN GLYCOSYLATED A1C: CPT

## 2024-03-11 ENCOUNTER — OFFICE VISIT (OUTPATIENT)
Dept: OTOLARYNGOLOGY | Facility: CLINIC | Age: 54
End: 2024-03-11
Payer: MEDICAID

## 2024-03-11 ENCOUNTER — CLINICAL SUPPORT (OUTPATIENT)
Dept: AUDIOLOGY | Facility: HOSPITAL | Age: 54
End: 2024-03-11
Payer: MEDICAID

## 2024-03-11 VITALS — DIASTOLIC BLOOD PRESSURE: 90 MMHG | TEMPERATURE: 99 F | HEART RATE: 52 BPM | SYSTOLIC BLOOD PRESSURE: 154 MMHG

## 2024-03-11 DIAGNOSIS — R09.81 NASAL CONGESTION: ICD-10-CM

## 2024-03-11 DIAGNOSIS — H90.6 MIXED CONDUCTIVE AND SENSORINEURAL HEARING LOSS OF BOTH EARS: ICD-10-CM

## 2024-03-11 DIAGNOSIS — R51.9 CHRONIC NONINTRACTABLE HEADACHE, UNSPECIFIED HEADACHE TYPE: ICD-10-CM

## 2024-03-11 DIAGNOSIS — J30.9 ALLERGIC RHINITIS, UNSPECIFIED SEASONALITY, UNSPECIFIED TRIGGER: ICD-10-CM

## 2024-03-11 DIAGNOSIS — R22.1 LOCALIZED SWELLING, MASS AND LUMP, NECK: ICD-10-CM

## 2024-03-11 DIAGNOSIS — H90.A32 MIXED CONDUCTIVE AND SENSORINEURAL HEARING LOSS OF LEFT EAR WITH RESTRICTED HEARING OF RIGHT EAR: Primary | ICD-10-CM

## 2024-03-11 DIAGNOSIS — M79.18 MYOFASCIAL PAIN: ICD-10-CM

## 2024-03-11 DIAGNOSIS — H90.6 MIXED CONDUCTIVE AND SENSORINEURAL HEARING LOSS OF BOTH EARS: Primary | ICD-10-CM

## 2024-03-11 DIAGNOSIS — D17.0 LIPOMA OF NECK: ICD-10-CM

## 2024-03-11 DIAGNOSIS — G89.29 CHRONIC NONINTRACTABLE HEADACHE, UNSPECIFIED HEADACHE TYPE: ICD-10-CM

## 2024-03-11 DIAGNOSIS — H83.8X3 SUPERIOR SEMICIRCULAR CANAL DEHISCENCE OF BOTH EARS: ICD-10-CM

## 2024-03-11 DIAGNOSIS — R42 DYSEQUILIBRIUM: ICD-10-CM

## 2024-03-11 PROCEDURE — 3077F SYST BP >= 140 MM HG: CPT | Mod: CPTII,,, | Performed by: NURSE PRACTITIONER

## 2024-03-11 PROCEDURE — 92591 HC HEARING AID EXAM, BOTH EARS: CPT | Performed by: AUDIOLOGIST

## 2024-03-11 PROCEDURE — 1159F MED LIST DOCD IN RCRD: CPT | Mod: CPTII,,, | Performed by: NURSE PRACTITIONER

## 2024-03-11 PROCEDURE — 3044F HG A1C LEVEL LT 7.0%: CPT | Mod: CPTII,,, | Performed by: NURSE PRACTITIONER

## 2024-03-11 PROCEDURE — 99213 OFFICE O/P EST LOW 20 MIN: CPT | Mod: PBBFAC,25 | Performed by: NURSE PRACTITIONER

## 2024-03-11 PROCEDURE — 3080F DIAST BP >= 90 MM HG: CPT | Mod: CPTII,,, | Performed by: NURSE PRACTITIONER

## 2024-03-11 PROCEDURE — 92557 COMPREHENSIVE HEARING TEST: CPT | Performed by: AUDIOLOGIST

## 2024-03-11 PROCEDURE — 92567 TYMPANOMETRY: CPT | Performed by: AUDIOLOGIST

## 2024-03-11 PROCEDURE — 99215 OFFICE O/P EST HI 40 MIN: CPT | Mod: S$PBB,,, | Performed by: NURSE PRACTITIONER

## 2024-03-11 RX ORDER — FLUTICASONE PROPIONATE 50 MCG
2 SPRAY, SUSPENSION (ML) NASAL 2 TIMES DAILY
Qty: 16 G | Refills: 11 | Status: SHIPPED | OUTPATIENT
Start: 2024-03-11 | End: 2024-04-10

## 2024-03-11 NOTE — PROGRESS NOTES
Hearing Evaluation        Patient History: Ms. Dance has a long-standing hearing loss reporting no changes in hearing since previous evaluation. She also reports occasional bilateral tinnitus. Vertigo and middle ear issues are denied. All additional history is unremarkable.        Test Results:                    Pure Tone Testing:      Right ear:       Moderate to moderately severe sensorineural hearing loss from 250-8kHz. Speech reception threshold is in agreement with puretone findings.  Discrimination score of 92% is considered excellent.        Left ear:          Moderately severe to severe mixed hearing loss from 250-8kHz. Speech reception threshold is in agreement with puretone findings.  Discrimination score of 92% is considered excellent.                                                                            Tympanometry:                                           Right ear:       Type 'B' tymp, normal (2.52mL) volume     Left ear:          Type 'B' tymp, normal (2.62mL) volume                                  Interpretations:      Behavioral test findings indicate no significant changes in hearing since previous hearing evaluation. Speech reception thresholds obtained at 60dB, AD and 65dB, AS, and are in agreement with puretone findings. Speech discrimination scores of 92%, AU, are considered excellent.  Immittance measures indicate abnormal/absent TM mobility, bilaterally.       Hearing Aid Consultation      Monaural/Binaural Binaural   Make/Model Phonak L50-R   Color P4 (dark brown)    size/length 1L/R - standard   Dome Closed/vented   Accessory N/a     Hearing Aid Consultation Note: Although patient's hearing loss is long-standing, she is new to hearing aids. Has extreme difficulty with communication efforts; therefore she is very motivated in amplification utilization. She also expresses interest in direct connectivity and rechargeable options.    Recommendations:   ENT for medical  clearance  Binaural amplification  Annual hearing evaluations

## 2024-03-11 NOTE — PROGRESS NOTES
"Patient Name: Glenda Huval Dance   YOB: 1970     Chief Complaint:   Chief Complaint   Patient presents with    Follow-up     Discuss Results MRI/US        History of Present Illness:  46 yo female referred for evaluation of a thyroid nodule and hearing loss. In October, patient suffered an episode of angioedema after taking Augmentin and required 5 days of intubation. At that time a scan was obtained and she was incidentally found to have thyroid nodules measuring up to 1.2 cm. She also has a longstanding history of hearing loss. She states that since she was a kid she has had left sided hearing loss, now has some in the right. Had pulsatile tinnitus in the right and underwent repair of what sounds like an encephalocele by Dr. Villalobos (?) in [place] about 8-9 years ago. Was suppose to have surgery on the left and "rods" placed in her ears (? OCR or stapes) but lost her insurance. Last audio was 7 years ago. Patient also complains of constantly needing to clear her throat and persistent hoarseness since her intubation. Per record intubation was uneventful and a 7.5 ETT was used.    1/23/18: Improved hoarseness since last visit, did not see ST or pick-up PPI. Denies otalgia, odynophagia, dysphagia, dyspnea, new masses/lesions. Weight stable. Unable to perform audio 2/2 cerumen impaction. Thyroid US shows 2 solid nodules on left, amenable to FNA. Has appointment on 1/30/18 with IR per patient; received a message yesterday.    5/17/18: Here for f/u. Pt had surgery with Rosa Elena and Nidhi in [facility] a few years ago. 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 She has persistent right CHL and worse LEft CHL. 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 Was going to get BAHA with rosa elena but lost insurance.  Shes unsure why she is here today     1/10/19: Here for f/u for CHL and thyroid nodules. Hearing stable subjectively. Had episode of spinning " dizziness exacerbated by head movement on 12/26/18, but this resolved and she hasn't had any spinning since.     4/25/19: 49yoF smoker that presents for evaluation for FNA. She had a thyroid US in 2018 that showed a 2cm left superior pole nodule and 1.5cm inferior pole nodule. She notes globus sensation and weird sensation in throat. Intermittent dysphonia worst in the AM. PND. Thick mucus/phlegm that requires clearing her throat/coughing often. Discomfort in midline chest intermittently. Not on PPI. Most recent TFTs were WNL. Denies dyspnea, dysphagia, weight loss.    Smokes 1-1.5PPD     2/11/20: Here for annual follow-up with audio. Audio stable. Patient reports dizziness that occurred a few months ago with spinning, nausea, vomiting exacerbated with head movements. This resolved and since she has had dizziness with eye movement at rest, with ambulation or driving. Last seconds. Feels that her vision becomes blurry. Feels hearing is stable, no otalgia, otorrhea, aural fullness or changes in tinnitus. Current smoker.     April 26, 2022: Patient presents today for follow-up of mass involving her right posterior neck. In reviewing the patient's chart patient was found to have probable lipoma involving the right posterior neck. Patient had a CT scan of the neck done on October 5, 2017, and it showed findings of a 3.6 x 2.2 cm mass in the right posterior neck and subcutaneous tissue which would be consistent with a lipoma. An ultrasound of the soft tissue of the neck was then done on March 7, 2018, and showed the lesion had increased in size to 5.2 x 5.0 x 1.8cm.  Since that time the patient has felt that the lesion has at least doubled in size. She does experience some occipital headaches on that side as well as some soreness in the neck and she is not sure if it is related to this lesion. She has no weakness or paresthesias involving the upper extremities or shoulder. No tenderness. No drainage.  Also of note she  does have a history of hearing loss and had undergone repair of an encephalocele and right superior semicircular canal occlusion via a right middle fossa approach by Dr. Ng several years ago. She had a persistent right conductive hearing loss and a left hearing loss. Records indicate that she also likely had a dehiscence on the left with an encephalocele. She was to get a BAHA hearing aid with Dr. Villalobos, but had lost her insurance. Her last audiogram was in February 2020.    July 20, 2022: 52-year-old female presents today for follow-up of suspected lipoma the right posterior neck and hearing loss with history of superior semicircular canal dehiscence.  In regards to her lipoma she has not noticed any change since her last appointment.  She had a CT scan of the soft tissues of the neck with contrast done on July 14, 2022, which showed the presence of a 4.8 x 7.9 x 5.4 cm subcutaneous soft tissue mass with consistency of a lipoma in the right posterior neck.  Previously on a CT scan done on October 5, 2017 it measured 2.3 x 3.8 x 2.4 cm.  On ultrasound done on Brie 3, 2022, the lesion measured 9.1 x 8.5 x 3.2 cm.  Lesion appeared to be confined to the subcutaneous tissue and did not invade any underlying muscle.  Radiologist indicated there were no concerning factors.  Results were discussed with the patient today.  The patient is still having some discomfort in her neck and occipital region as previously described.  Audiogram was done on May 16, 2022, and this showed the presence of a moderate to severe mixed hearing loss in the right ear at 250-8000 hertz and in the left ear a moderate to severe mixed hearing loss at 250-8000 hertz.  Speech reception threshold was 50 decibels in the right ear and 60 decibels in left ear with 100% discrimination in the right ear 96% discrimination in the left ear.  She had type B tympanograms bilaterally.  On examination of the ears on the CT scan of the neck she does not  appear to have middle ear effusions.      3/11/24: Referred back from audiology for hearing aid clearance. States she still desires to have neck mass removed. Reports that her sister got very sick & was in ICU in [place] last year, and she was lost to f/u related to that and due to losing her insurance. She feels that the mass is continuing to grow. She has grown her hair long to try and disguise it. C/o frequent and debilitating headaches for which she has taken fioricet without improvement and was then (last week) placed on imitrex and topamax daily. She has not noticed any improvement yet. Admits to taking BC powder frequently. Headaches have been ongoing for years but seem to be getting worse over the past 1-2 years. States she has had migraines but that this is different. They begin with a shooting/stabbing pain behind her right eye and radiate to her head and down to occiput. She feels like they might be r/t her sinuses. Endorses nasal congestion for which she uses OTC nasal spray occasionally. No hx of recurrent sinus infections. Also c/o frequent dysequilibrium described as things appear moving when she knows they are not. This seems to be associated with quick eye movements in any direction. It often occurs when she looks up from her phone, when she uses stairs, and when she is driving. Episodes last minutes and have been present for many years. Denies falls or head trauma. States she usually does not mention it because she feels crazy describing it.        Past Medical History:  Past Medical History:   Diagnosis Date    Sensorineural hearing loss (SNHL) of both ears      Past Surgical History:   Procedure Laterality Date     SECTION  2001    EAR RECONSTRUCTION Right 2012    HYSTERECTOMY         Review of Systems:  Unremarkable except as mentioned above.    Current Medications:  No current outpatient medications on file.     No current facility-administered medications for this visit.     "    Allergies:  Review of patient's allergies indicates:   Allergen Reactions    Penicillins Anaphylaxis        Physical Exam:  Vital signs:   Vitals:    07/20/22 0938   BP: 125/77   BP Location: Right arm   Patient Position: Sitting   BP Method: Medium (Automatic)   Pulse: 61   Resp: 16   Temp: 99 °F (37.2 °C)   TempSrc: Oral   Weight: 69.4 kg (153 lb)   Height: 5' 1" (1.549 m)   General:  Well-developed well-nourished female in no acute distress.  Voice is normal.  Head and face:  Normocephalic.  No facial lesions.  No temporomandibular joint tenderness or click.  Ears:  Right ear-auricle is normally developed.  External auditory canal is clear.  Tympanic membrane is nonerythematous.  No middle ear effusion.  Left ear-auricle is normally developed.  External auditory canal is clear.  Tympanic membrane is nonerythematous.  No middle ear effusion.  Nose:  Nasal dorsum unremarkable.  No rhinorrhea.  Neck:  Supple without palpable adenopathy.  Patient has a 12+ cm mobile nontender subcutaneous mass involving the right lower posterior neck/trapezius region.  No overlying erythema.  Trachea midline.  No palpable thyromegaly.  Parotid and submandibular are normal to palpation.  Eyes:  Extraocular muscles intact.  No nystagmus.  No exophthalmos or enophthalmos.  Neurologic:  Alert and oriented.  Cranial nerves 2-12 are grossly normal.    Mass/suspected lipoma:       Imaging:       Audio:         Assessment/Plan:   53-year-old female with right posterior neck mass which is suspected to be a lipoma. 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 Villalobos). Audio essentially stable- reviewed. Also with debilitating headaches, dysequilibrium, neck pain, AR/nasal congestion. CT temporal bones 2/2018 with b/l tegmen dehiscence. CT neck 7/2022 unremarkable for chronic sinus dz- reviewed. D/w Dr. Amor.    Plan:  - Medically cleared for amplification  - Annual " audio  - MRI IAC for HL, headaches, dysequilibrium  - Will discuss surgical plan with residents (per prev note, will plan for RTC 2 days postoperatively should she have a drain placed at the time of surgery)  - After she recovers from this will discuss further about possibly referring back to otologist for further evaluation and treatment of her superior semicircular canal dehiscence and hearing loss  - Flonase BID for nasal congestion/rhinits (reviewed technique)  - Neck stretches, headache diet (provided)  - Vestibular workup for dysequilibrium  - F/u with PCP regarding h/a mgmt  - RTC 6-8 weeks      Madhavi Kuhn NP

## 2024-03-12 ENCOUNTER — HOSPITAL ENCOUNTER (OUTPATIENT)
Dept: RADIOLOGY | Facility: HOSPITAL | Age: 54
Discharge: HOME | End: 2024-03-12
Attending: NURSE PRACTITIONER
Payer: MEDICAID

## 2024-03-12 DIAGNOSIS — Z12.31 ENCOUNTER FOR SCREENING MAMMOGRAM FOR MALIGNANT NEOPLASM OF BREAST: ICD-10-CM

## 2024-03-12 PROCEDURE — 77067 SCR MAMMO BI INCL CAD: CPT | Mod: 26,,, | Performed by: STUDENT IN AN ORGANIZED HEALTH CARE EDUCATION/TRAINING PROGRAM

## 2024-03-12 PROCEDURE — 77067 SCR MAMMO BI INCL CAD: CPT | Mod: TC

## 2024-03-12 PROCEDURE — 77063 BREAST TOMOSYNTHESIS BI: CPT | Mod: 26,,, | Performed by: STUDENT IN AN ORGANIZED HEALTH CARE EDUCATION/TRAINING PROGRAM

## 2024-03-18 ENCOUNTER — HOSPITAL ENCOUNTER (OUTPATIENT)
Dept: RADIOLOGY | Facility: HOSPITAL | Age: 54
Discharge: HOME | End: 2024-03-18
Attending: NURSE PRACTITIONER
Payer: MEDICAID

## 2024-03-18 ENCOUNTER — HOSPITAL ENCOUNTER (OUTPATIENT)
Dept: PULMONOLOGY | Facility: HOSPITAL | Age: 54
Discharge: HOME | End: 2024-03-18
Attending: NURSE PRACTITIONER
Payer: MEDICAID

## 2024-03-18 DIAGNOSIS — R51.9 WORSENING HEADACHES: ICD-10-CM

## 2024-03-18 DIAGNOSIS — Z72.0 TOBACCO ABUSE: ICD-10-CM

## 2024-03-18 DIAGNOSIS — R06.02 SOBOE (SHORTNESS OF BREATH ON EXERTION): ICD-10-CM

## 2024-03-18 PROCEDURE — 94726 PLETHYSMOGRAPHY LUNG VOLUMES: CPT

## 2024-03-18 PROCEDURE — 94010 BREATHING CAPACITY TEST: CPT

## 2024-03-18 PROCEDURE — 70450 CT HEAD/BRAIN W/O DYE: CPT | Mod: TC

## 2024-03-21 LAB
ERV LLN: -16449.16
ERV PRE REF: 52.3 %
ERV REF: 0.84
FEF 25 75 LLN: 1.22
FEF 25 75 PRE REF: 20.7 %
FEF 25 75 REF: 2.29
FEV1 FVC LLN: 69
FEV1 FVC PRE REF: 80.5 %
FEV1 FVC REF: 80
FEV1 LLN: 1.81
FEV1 PRE REF: 41.7 %
FEV1 REF: 2.36
FRCPLETH LLN: 1.7
FRCPLETH PREREF: 96 %
FRCPLETH REF: 2.53
FVC LLN: 2.27
FVC PRE REF: 51.6 %
FVC REF: 2.95
MVV LLN: 73
MVV PRE REF: 74.3 %
MVV REF: 86
PEF LLN: 4.54
PEF PRE REF: 43.7 %
PEF REF: 6.09
PRE ERV: 0.44 L (ref -16449.16–16450.84)
PRE FEF 25 75: 0.47 L/S (ref 1.22–3.7)
PRE FET 100: 6.97 SEC
PRE FEV1 FVC: 64.57 % (ref 68.56–90.11)
PRE FEV1: 0.98 L (ref 1.81–2.89)
PRE FRC PL: 2.42 L (ref 1.7–3.35)
PRE FVC: 1.52 L (ref 2.27–3.66)
PRE MVV: 63.73 L/MIN (ref 72.93–98.68)
PRE PEF: 2.66 L/S (ref 4.54–7.64)
PRE RV: 1.98 L (ref 1.11–2.26)
PRE TLC: 3.68 L (ref 3.45–5.42)
RAW LLN: 3.06
RAW PRE REF: 228.2 %
RAW PRE: 6.98 CMH2O*S/L (ref 3.06–3.06)
RAW REF: 3.06
RV LLN: 1.11
RV PRE REF: 117.8 %
RV REF: 1.68
RVTLC LLN: 28
RVTLC PRE REF: 143.2 %
RVTLC PRE: 53.93 % (ref 28.07–47.25)
RVTLC REF: 38
TLC LLN: 3.45
TLC PRE REF: 82.9 %
TLC REF: 4.44
VC LLN: 2.27
VC PRE REF: 57.5 %
VC PRE: 1.7 L (ref 2.27–3.66)
VC REF: 2.95

## 2024-03-21 PROCEDURE — 94010 BREATHING CAPACITY TEST: CPT | Mod: 26,,, | Performed by: INTERNAL MEDICINE

## 2024-03-21 PROCEDURE — 94726 PLETHYSMOGRAPHY LUNG VOLUMES: CPT | Mod: 26,,, | Performed by: INTERNAL MEDICINE

## 2024-04-02 ENCOUNTER — HOSPITAL ENCOUNTER (OUTPATIENT)
Dept: RADIOLOGY | Facility: HOSPITAL | Age: 54
Discharge: HOME | End: 2024-04-02
Attending: NURSE PRACTITIONER
Payer: MEDICAID

## 2024-04-02 DIAGNOSIS — H90.6 MIXED CONDUCTIVE AND SENSORINEURAL HEARING LOSS OF BOTH EARS: ICD-10-CM

## 2024-04-02 PROCEDURE — 70553 MRI BRAIN STEM W/O & W/DYE: CPT | Mod: TC

## 2024-04-02 PROCEDURE — A9577 INJ MULTIHANCE: HCPCS

## 2024-04-02 PROCEDURE — 25500020 PHARM REV CODE 255

## 2024-04-02 RX ADMIN — GADOBENATE DIMEGLUMINE 17 ML: 529 INJECTION, SOLUTION INTRAVENOUS at 03:04

## 2024-04-04 ENCOUNTER — OFFICE VISIT (OUTPATIENT)
Dept: INTERNAL MEDICINE | Facility: CLINIC | Age: 54
End: 2024-04-04
Payer: MEDICAID

## 2024-04-04 VITALS
SYSTOLIC BLOOD PRESSURE: 128 MMHG | DIASTOLIC BLOOD PRESSURE: 74 MMHG | BODY MASS INDEX: 33.45 KG/M2 | HEIGHT: 61 IN | WEIGHT: 177.19 LBS | HEART RATE: 79 BPM | RESPIRATION RATE: 18 BRPM | TEMPERATURE: 99 F

## 2024-04-04 DIAGNOSIS — E66.09 CLASS 1 OBESITY DUE TO EXCESS CALORIES WITH SERIOUS COMORBIDITY AND BODY MASS INDEX (BMI) OF 33.0 TO 33.9 IN ADULT: Primary | Chronic | ICD-10-CM

## 2024-04-04 DIAGNOSIS — R73.01 IFG (IMPAIRED FASTING GLUCOSE): Chronic | ICD-10-CM

## 2024-04-04 DIAGNOSIS — E78.2 MIXED HYPERLIPIDEMIA: Chronic | ICD-10-CM

## 2024-04-04 DIAGNOSIS — D17.0 LIPOMA OF NECK: ICD-10-CM

## 2024-04-04 DIAGNOSIS — Z72.0 TOBACCO ABUSE: ICD-10-CM

## 2024-04-04 DIAGNOSIS — R80.9 PROTEINURIA, UNSPECIFIED TYPE: ICD-10-CM

## 2024-04-04 DIAGNOSIS — J43.9 PULMONARY EMPHYSEMA, UNSPECIFIED EMPHYSEMA TYPE: ICD-10-CM

## 2024-04-04 PROBLEM — H90.6 MIXED CONDUCTIVE AND SENSORINEURAL HEARING LOSS OF BOTH EARS: Chronic | Status: ACTIVE | Noted: 2022-07-20

## 2024-04-04 PROBLEM — J44.9 CHRONIC OBSTRUCTIVE PULMONARY DISEASE: Chronic | Status: ACTIVE | Noted: 2024-01-19

## 2024-04-04 PROCEDURE — 3044F HG A1C LEVEL LT 7.0%: CPT | Mod: CPTII,,, | Performed by: NURSE PRACTITIONER

## 2024-04-04 PROCEDURE — 1159F MED LIST DOCD IN RCRD: CPT | Mod: CPTII,,, | Performed by: NURSE PRACTITIONER

## 2024-04-04 PROCEDURE — 1160F RVW MEDS BY RX/DR IN RCRD: CPT | Mod: CPTII,,, | Performed by: NURSE PRACTITIONER

## 2024-04-04 PROCEDURE — 99215 OFFICE O/P EST HI 40 MIN: CPT | Mod: PBBFAC | Performed by: NURSE PRACTITIONER

## 2024-04-04 PROCEDURE — 99214 OFFICE O/P EST MOD 30 MIN: CPT | Mod: S$PBB,,, | Performed by: NURSE PRACTITIONER

## 2024-04-04 PROCEDURE — 3008F BODY MASS INDEX DOCD: CPT | Mod: CPTII,,, | Performed by: NURSE PRACTITIONER

## 2024-04-04 PROCEDURE — 3078F DIAST BP <80 MM HG: CPT | Mod: CPTII,,, | Performed by: NURSE PRACTITIONER

## 2024-04-04 PROCEDURE — 3074F SYST BP LT 130 MM HG: CPT | Mod: CPTII,,, | Performed by: NURSE PRACTITIONER

## 2024-04-04 RX ORDER — BUDESONIDE AND FORMOTEROL FUMARATE DIHYDRATE 160; 4.5 UG/1; UG/1
2 AEROSOL RESPIRATORY (INHALATION) EVERY 12 HOURS
Qty: 10.2 G | Refills: 3 | Status: SHIPPED | OUTPATIENT
Start: 2024-04-04 | End: 2025-04-04

## 2024-04-04 RX ORDER — CETIRIZINE HYDROCHLORIDE 10 MG/1
10 TABLET ORAL NIGHTLY
Qty: 90 TABLET | Refills: 3 | Status: SHIPPED | OUTPATIENT
Start: 2024-04-04 | End: 2025-04-04

## 2024-04-04 RX ORDER — ROSUVASTATIN CALCIUM 20 MG/1
20 TABLET, COATED ORAL DAILY
Qty: 90 TABLET | Refills: 3 | Status: SHIPPED | OUTPATIENT
Start: 2024-04-04 | End: 2025-04-04

## 2024-04-04 NOTE — PROGRESS NOTES
Apple Rodriguez, FLORESITA   [facility]  [address] [facility] [address]      PATIENT NAME: Glenda Huval Dance  : 1970  DATE: 24  MRN: [id number]      Reason for Visit / Chief Complaint: Follow-up       History of Present Illness / Problem Focused Workflow     Glenda Huval Dance is a 54 y.o. White female presents to the clinic for lab, PFT and CT results. PMH mixed conductive and SNHL bilaterally, lipoma of neck, tobacco abuse (quit 2024-40 pk yr), CAP and obesity. Followed by [facility] audiology and ENT clinics.     Today, pt continues to report chronic cough and SOBOE. Has continued smoking cessation since 2024. Has completed hearing evaluation and is to receive hearing aids on 24. Reports some improvement in headaches with meds and nasal spray. Is requesting evaluation for mass to the posterior right neck. Reports neck and shoulder pain as well as posterior headaches with certain movements and shooting pain. Can only position certain ways at night and covers with her hair. Has been present for several years and feels is growing with causing more physical problems. Labs and PFT reviewed with pt. Declines vaccine today. Denies chest pain, fever, dizziness, weakness, abdominal pain, nausea, vomiting, diarrhea, constipation, dysuria, depression, anxiety, SI/HI.      Review of Systems     Review of Systems     See HPI for details    Constitutional: Denies Change in appetite. Denies Chills. Denies Fever. Denies Night sweats.  Eye: Denies Blurred vision. Denies Discharge. Denies Eye pain.  ENT: Admits Decreased hearing. Denies Sore throat. Denies Swollen glands.  Respiratory: Admits Cough. Admits Shortness of breath. Admits Shortness of breath with exertion. Denies Wheezing.  Cardiovascular: Denies Chest pain at rest. Denies Chest pain with exertion. Denies Irregular heartbeat. Denies Palpitations. Denies Edema.  Gastrointestinal: Denies  Abdominal pain. Denies Diarrhea. Denies Nausea. Denies Vomiting. Denies Hematemesis or Hematochezia.  Genitourinary: Denies Dysuria. Denies Urinary frequency. Denies Urinary urgency. Denies Blood in urine.  Endocrine: Denies Cold intolerance. Denies Excessive thirst. Denies Heat intolerance. Denies Weight loss. Denies Weight gain.  Musculoskeletal: Denies Painful joints. Denies Weakness.  Integumentary: Denies Rash. Denies Itching. Denies Dry skin.  Neurologic: Denies Dizziness. Denies Fainting. Denies Headache.  Psychiatric: Denies Depression. Denies Anxiety. Denies Suicidal/Homicidal ideations.    All Other ROS: Negative except as stated in HPI.     Medical / Surgical / Social / Family History       ----------------------------  Sensorineural hearing loss (SNHL) of both ears     Past Surgical History:   Procedure Laterality Date     SECTION  2001    EAR RECONSTRUCTION Right 2012    HYSTERECTOMY         Social History     Socioeconomic History    Marital status:    Tobacco Use    Smoking status: Former     Current packs/day: 0.00     Average packs/day: 1 pack/day for 43.0 years (43.0 ttl pk-yrs)     Types: Cigarettes     Quit date: 1/15/2024     Years since quittin.2    Smokeless tobacco: Never   Substance and Sexual Activity    Alcohol use: Not Currently     Comment: occasionally    Drug use: Yes     Frequency: 1.0 times per week     Types: Marijuana     Comment: Sometimes at night  to sleep    Sexual activity: Yes     Partners: Male     Birth control/protection: None     Social Determinants of Health     Financial Resource Strain: Medium Risk (2024)    Overall Financial Resource Strain (CARDIA)     Difficulty of Paying Living Expenses: Somewhat hard   Food Insecurity: Food Insecurity Present (2024)    Hunger Vital Sign     Worried About Running Out of Food in the Last Year: Sometimes true     Ran Out of Food in the Last Year: Sometimes true   Transportation Needs: No Transportation  Needs (4/4/2024)    PRAPARE - Transportation     Lack of Transportation (Medical): No     Lack of Transportation (Non-Medical): No   Physical Activity: Inactive (4/4/2024)    Exercise Vital Sign     Days of Exercise per Week: 0 days     Minutes of Exercise per Session: 0 min   Stress: No Stress Concern Present (4/4/2024)    [employer] - Occupational Stress Questionnaire     Feeling of Stress : Not at all   Recent Concern: Stress - Stress Concern Present (1/18/2024)    [place] - Occupational Stress Questionnaire     Feeling of Stress : To some extent   Social Connections: Moderately Integrated (4/4/2024)    Social Connection and Isolation Panel [NHANES]     Frequency of Communication with Friends and Family: More than three times a week     Frequency of Social Gatherings with Friends and Family: Once a week     Attends Yarsanism Services: 1 to 4 times per year     Active Member of Clubs or Organizations: No     Attends Club or Organization Meetings: Never     Marital Status: Living with partner   Housing Stability: High Risk (4/4/2024)    Housing Stability Vital Sign     Unable to Pay for Housing in the Last Year: Yes     Number of Places Lived in the Last Year: 1     Unstable Housing in the Last Year: No        Family History   Problem Relation Age of Onset    Cancer Sister         Medications and Allergies     Medications  Current Outpatient Medications   Medication Instructions    albuterol (VENTOLIN HFA) 90 mcg/actuation inhaler 1 puff, Inhalation, Every 4 hours PRN, Rescue    albuterol-ipratropium (DUO-NEB) 2.5 mg-0.5 mg/3 mL nebulizer solution 3 mLs, Nebulization, Every 4 hours, Rescue    budesonide-formoterol 160-4.5 mcg (SYMBICORT) 160-4.5 mcg/actuation HFAA 2 puffs, Inhalation, Every 12 hours, Controller    cetirizine (ZYRTEC) 10 mg, Oral, Nightly    fluticasone propionate (FLONASE) 100 mcg, Each Nostril, 2 times daily    rosuvastatin (CRESTOR)  "20 mg, Oral, Daily    sumatriptan (IMITREX) 25 mg, Oral, Daily PRN, May repeat x 1 dose in 2 hours if ineffective.    topiramate (TOPAMAX) 25 MG tablet Take one tablet at bedtime for 7 days then increase to BID.         Allergies  Review of patient's allergies indicates:   Allergen Reactions    Penicillins Anaphylaxis and Swelling       Physical Examination     /74   Pulse 79   Temp 99 °F (37.2 °C)   Resp 18   Ht 5' 1" (1.549 m)   Wt 80.4 kg (177 lb 3.2 oz)   BMI 33.48 kg/m²     Physical Exam  Constitutional:       Appearance: She is obese.   Pulmonary:      Breath sounds: Examination of the right-upper field reveals rhonchi. Examination of the left-upper field reveals rhonchi. Examination of the right-middle field reveals rhonchi. Examination of the left-middle field reveals rhonchi. Examination of the right-lower field reveals rhonchi. Examination of the left-lower field reveals rhonchi. Rhonchi present.         General: Alert and oriented, No acute distress.  Head: Normocephalic, Atraumatic.  Eye: Pupils are equal, round and reactive to light, Extraocular movements are intact, Sclera non-icteric.  Ears/Nose/Throat: Normal, Mucosa moist,Clear.  Neck/Thyroid: Supple, Non-tender, No carotid bruit, No lymphadenopathy, No JVD, Full range of motion.  Respiratory: No wheezes, or rales. Symmetrical chest wall expansion.  Cardiovascular: Regular rate and rhythm, S1/S2 normal, No murmurs, rubs or gallops.  Gastrointestinal: Soft, Non-tender, Non-distended, Normal bowel sounds, No palpable organomegaly.  Integumentary: Warm, Dry, Intact, No suspicious lesions or rashes.  Extremities: No clubbing, cyanosis or edema  Neurologic: No focal deficits, Cranial Nerves II-XII are grossly intact, Motor strength normal upper and lower extremities, Sensory exam intact.  Psychiatric: Normal interaction, Coherent speech, Appropriate affect         Results     Lab Results   Component Value Date    WBC 8.96 02/21/2024    HGB " 13.5 02/21/2024    HCT 38.5 02/21/2024     02/21/2024    CHOL 277 (H) 03/07/2024    TRIG 202 (H) 03/07/2024    ALT 26 02/21/2024    AST 22 02/21/2024     02/21/2024    K 4.5 02/21/2024    CREATININE 0.78 02/21/2024    BUN 17.5 02/21/2024    CO2 23 02/21/2024    TSH 1.220 03/07/2024    HGBA1C 5.7 03/07/2024     CT Head Without Contrast  Order: [id number]  Status: Final result       Visible to patient: Yes (seen)       Next appt: 04/11/2024 at 08:30 AM in Audiology (AUDIOLOGIST 1, [facility] AUDIOLOG[facility])       Dx: Worsening headaches    0 Result Notes  Details    Reading Physician Reading Date Result Priority   Arjun Freeman MD  [phone] 3/18/2024 Routine     Narrative & Impression  EXAMINATION:  CT HEAD WITHOUT CONTRAST     CLINICAL HISTORY:  Headache, new or worsening (Age >= 50y); Headache, unspecified     TECHNIQUE:  Low dose axial images were obtained through the head.  Coronal and sagittal reformations were also performed. Contrast was not administered.  .  Automated exposure control used.     COMPARISON:  02/27/2018     FINDINGS:  Brain volume normal.     Ventricles, sulci, cisterns normal with no mass effect or midline shift.  No intra or extra-axial mass or hemorrhage.  Normal gray-white differentiation.     Cranium and extracranial structures unremarkable.     Impression:     No intracranial abnormality.        Electronically signed by: Kaykay Freeman MD  Date:                                            03/18/2024  Time:                                           13:37       Assessment and Plan (including Health Maintenance)     Plan:     1. Pulmonary emphysema, unspecified emphysema type  PFT 3/21/24 - severe obstruction. LDCT ordered.  Rx symbicort BID.  Continue rescue inhaler prn.   Use inhalers as prescribed (rinse mouth after use of steroid inhalers).   Use long term inhalers daily and rescue inhaler as needed.   Avoid triggers (high humidity, strong odors, chemical fumes).   Report  signs of upper respiratory infection as soon as possible for early treatment.   Practice/encouraged abdominal breathing.  Eat smaller, more frequent meals.  Flu shot recommended yearly.   Smoking cessation encouraged.    - budesonide-formoterol 160-4.5 mcg (SYMBICORT) 160-4.5 mcg/actuation HFAA; Inhale 2 puffs into the lungs every 12 (twelve) hours. Controller  Dispense: 10.2 g; Refill: 3    2. Mixed hyperlipidemia   / Trig 202 / HDL - 43 / Total chol - 277.  Rx crestor.  Follow a low cholesterol, low saturated fat diet with less than 200 mg of cholesterol a day.   Avoid fried foods and high saturated fats (lorenzo, sausage, cookies, cakes, chips, cheese, whole milk, butter, mayonnaise, creamy dressings, gravy and cream sauces).  Add flax seed or fish oil supplements to diet.   Increase dietary fiber.   Regular exercise improves cholesterol levels.  Physical activity 5 times a week for 30 minutes per day (or 150 minutes per week).   Stressed importance of dietary modifications.    - rosuvastatin (CRESTOR) 20 MG tablet; Take 1 tablet (20 mg total) by mouth once daily.  Dispense: 90 tablet; Refill: 3  - Lipid Panel; Future    3. Class 1 obesity due to excess calories with serious comorbidity and body mass index (BMI) of 33.0 to 33.9 in adult  BMI 33. Goal BMI <30.  States has gained 15 lbs since quit smoking.  Aerobic exercise 150 minutes per week.  Avoid soda, simple sugars, sweets, excessive rice, pasta, potatoes or bread.   Choose brown options when available and portion control.  Limit fast foods and fried foods.   Choose complex carbs in moderation (ex: green, leafy vegetables, beans, oatmeal).  Eat plenty of fresh fruits and vegetables with lean meats daily.   Consider permanent healthy lifestyle changes.      4. Lipoma of neck  Referral to general surgery to eval/treat.  Keep appt when scheduled.  - Ambulatory referral/consult to General Surgery; Future    5. Tobacco abuse  Smoking cessation discussed;  total time 3 mins.   Pt quit in 1/2024.  Declines referral to Smoking Cessation program.  Discussed benefits of quitting including improved health, decreased cardiac/vascular/pulmonary/stroke risks as well as saving money.      6. Proteinuria, unspecified type  Referral to renal to eval/treat.  GFR >60.  Pt is not eating high protein diet.  Does not have HTN.   - Microalbumin/Creatinine Ratio, Urine; Future  - Urinalysis; Future    7. IFG   Glucose 102, A1C 5.7.  Avoid soda, simple sweets, and limit rice/pasta/bread/starches and consume brown options when possible.   Maintain healthy weight with BMI goal <30.   Perform aerobic exercise for 150 minutes per week (or 5 days a week for 30 minutes each day).       Problem List Items Addressed This Visit          Pulmonary    Chronic obstructive pulmonary disease (Chronic)    Relevant Medications    budesonide-formoterol 160-4.5 mcg (SYMBICORT) 160-4.5 mcg/actuation HFAA       Cardiac/Vascular    Mixed hyperlipidemia (Chronic)    Relevant Medications    rosuvastatin (CRESTOR) 20 MG tablet    Other Relevant Orders    Lipid Panel       Renal/    Proteinuria    Relevant Orders    Microalbumin/Creatinine Ratio, Urine    Urinalysis    Ambulatory referral/consult to Nephrology       Oncology    Lipoma of neck    Relevant Orders    Ambulatory referral/consult to General Surgery       Endocrine    Class 1 obesity due to excess calories with serious comorbidity and body mass index (BMI) of 33.0 to 33.9 in adult - Primary (Chronic)    IFG (impaired fasting glucose) (Chronic)    Relevant Orders    Comprehensive Metabolic Panel    Hemoglobin A1C       Other    Tobacco abuse (Chronic)         Health Maintenance Due   Topic Date Due    Hepatitis C Screening  Never done    COVID-19 Vaccine (1) Never done    Pneumococcal Vaccines (Age 0-64) (1 of 2 - PCV) Never done    HIV Screening  Never done    TETANUS VACCINE  Never done    Colorectal Cancer Screening  Never done    Shingles  Vaccine (1 of 2) Never done    Influenza Vaccine (1) 09/01/2023       Follow up in about 3 months (around 7/4/2024) for Follow up, Lab Results, COPD, proteinuria and IFG.        Signature:  FLORESITA Jordan  [facility]  [address] [facility] [address]

## 2024-04-08 LAB — NONINV COLON CA DNA+OCC BLD SCRN STL QL: NEGATIVE

## 2024-04-10 ENCOUNTER — HOSPITAL ENCOUNTER (EMERGENCY)
Facility: HOSPITAL | Age: 54
Discharge: HOME | End: 2024-04-10
Attending: EMERGENCY MEDICINE
Payer: MEDICAID

## 2024-04-10 VITALS
OXYGEN SATURATION: 95 % | HEART RATE: 90 BPM | DIASTOLIC BLOOD PRESSURE: 79 MMHG | HEIGHT: 61 IN | RESPIRATION RATE: 22 BRPM | BODY MASS INDEX: 32.47 KG/M2 | WEIGHT: 172 LBS | SYSTOLIC BLOOD PRESSURE: 134 MMHG | TEMPERATURE: 98 F

## 2024-04-10 DIAGNOSIS — J06.9 UPPER RESPIRATORY TRACT INFECTION, UNSPECIFIED TYPE: Primary | ICD-10-CM

## 2024-04-10 DIAGNOSIS — H92.01 RIGHT EAR PAIN: ICD-10-CM

## 2024-04-10 PROCEDURE — 99283 EMERGENCY DEPT VISIT LOW MDM: CPT

## 2024-04-10 RX ORDER — IBUPROFEN 600 MG/1
600 TABLET ORAL EVERY 6 HOURS PRN
Qty: 20 TABLET | Refills: 0 | Status: SHIPPED | OUTPATIENT
Start: 2024-04-10

## 2024-04-10 RX ORDER — NEOMYCIN SULFATE, POLYMYXIN B SULFATE AND HYDROCORTISONE 10; 3.5; 1 MG/ML; MG/ML; [USP'U]/ML
3 SUSPENSION/ DROPS AURICULAR (OTIC) 3 TIMES DAILY
Qty: 10 ML | Refills: 0 | Status: ON HOLD | OUTPATIENT
Start: 2024-04-10 | End: 2024-06-10 | Stop reason: HOSPADM

## 2024-04-10 NOTE — ED PROVIDER NOTES
Encounter Date: 4/10/2024       History     Chief Complaint   Patient presents with    Otalgia     Pt c/o cough, congestion x 2 days, low grade fever, Pt now c/o right earache, no drainage from ear, describes pain as a burning pain.      This 53 y/o presents with complaints of pain in her right ear that started at 4:00 a.m. this morning.  She has upper respiratory congestion for the last 2 days with low-grade fever.       Review of patient's allergies indicates:   Allergen Reactions    Penicillins Anaphylaxis and Swelling     Past Medical History:   Diagnosis Date    Sensorineural hearing loss (SNHL) of both ears      Past Surgical History:   Procedure Laterality Date     SECTION      EAR RECONSTRUCTION Right 2012    HYSTERECTOMY       Family History   Problem Relation Age of Onset    Cancer Sister      Social History     Tobacco Use    Smoking status: Former     Current packs/day: 0.00     Average packs/day: 1 pack/day for 43.0 years (43.0 ttl pk-yrs)     Types: Cigarettes     Quit date: 1/15/2024     Years since quittin.2    Smokeless tobacco: Never   Substance Use Topics    Alcohol use: Not Currently     Comment: occasionally    Drug use: Yes     Frequency: 1.0 times per week     Types: Marijuana     Comment: Sometimes at night  to sleep     Review of Systems   Constitutional:  Positive for fever (subjective).   HENT:  Positive for congestion and ear pain. Negative for sore throat.    Respiratory:  Negative for shortness of breath.    Cardiovascular:  Negative for chest pain.   Gastrointestinal:  Negative for nausea.   Genitourinary:  Negative for dysuria.   Musculoskeletal:  Negative for back pain.   Skin:  Negative for rash.   Neurological:  Negative for weakness.   Hematological:  Does not bruise/bleed easily.       Physical Exam     Initial Vitals [04/10/24 1100]   BP Pulse Resp Temp SpO2   134/79 90 (!) 22 97.8 °F (36.6 °C) 95 %      MAP       --         Physical Exam    Nursing note and  vitals reviewed.  Constitutional: She appears well-developed and well-nourished.   HENT:   Head: Normocephalic and atraumatic.   Mild erythema of external ear canal and TM   Eyes: Conjunctivae and EOM are normal. Pupils are equal, round, and reactive to light.   Neck: Neck supple.   Normal range of motion.  Cardiovascular:  Normal rate, regular rhythm, normal heart sounds and intact distal pulses.           Pulmonary/Chest: Breath sounds normal.   Abdominal: Abdomen is soft. Bowel sounds are normal.   Musculoskeletal:         General: Normal range of motion.      Cervical back: Normal range of motion and neck supple.     Neurological: She is alert and oriented to person, place, and time. She has normal strength.   Skin: Skin is warm and dry. Capillary refill takes less than 2 seconds.   Psychiatric: She has a normal mood and affect. Her behavior is normal. Judgment and thought content normal.         ED Course   Procedures  Labs Reviewed - No data to display       Imaging Results    None          Medications - No data to display  Medical Decision Making                                    Clinical Impression:  Final diagnoses:  [J06.9] Upper respiratory tract infection, unspecified type (Primary)  [H92.01] Right ear pain          ED Disposition Condition    Discharge Stable          ED Prescriptions       Medication Sig Dispense Start Date End Date Auth. Provider    neomycin-polymyxin-hydrocortisone (CORTISPORIN) 3.5-10,000-1 mg/mL-unit/mL-% otic suspension Place 3 drops into the right ear 3 (three) times daily. 10 mL 4/10/2024 -- Askhat Olsen MD    ibuprofen (ADVIL,MOTRIN) 600 MG tablet Take 1 tablet (600 mg total) by mouth every 6 (six) hours as needed for Pain. 20 tablet 4/10/2024 -- Akshat Olsen MD          Follow-up Information       Follow up With Specialties Details Why Contact Info    Apple Rodriguez FNP Nurse Practitioner Schedule an appointment as soon as possible for a visit   [address]  [facility]  Internal Medicine [facility] [address]  [phone]               Akshat Olsen MD  04/10/24 1112

## 2024-04-14 DIAGNOSIS — R51.9 WORSENING HEADACHES: ICD-10-CM

## 2024-04-15 RX ORDER — TOPIRAMATE 25 MG/1
TABLET ORAL
Qty: 60 TABLET | Refills: 3 | Status: SHIPPED | OUTPATIENT
Start: 2024-04-15

## 2024-04-22 PROBLEM — J18.9 CAP (COMMUNITY ACQUIRED PNEUMONIA): Status: RESOLVED | Noted: 2024-01-17 | Resolved: 2024-04-22

## 2024-04-23 ENCOUNTER — OFFICE VISIT (OUTPATIENT)
Dept: SURGERY | Facility: CLINIC | Age: 54
End: 2024-04-23
Payer: MEDICAID

## 2024-04-23 VITALS
SYSTOLIC BLOOD PRESSURE: 128 MMHG | OXYGEN SATURATION: 98 % | HEART RATE: 87 BPM | WEIGHT: 173 LBS | DIASTOLIC BLOOD PRESSURE: 78 MMHG | HEIGHT: 61 IN | BODY MASS INDEX: 32.66 KG/M2 | RESPIRATION RATE: 18 BRPM

## 2024-04-23 DIAGNOSIS — D17.0 LIPOMA OF NECK: ICD-10-CM

## 2024-04-23 DIAGNOSIS — R22.2 LOCALIZED SWELLING, MASS AND LUMP, TRUNK: Primary | ICD-10-CM

## 2024-04-23 PROCEDURE — 99215 OFFICE O/P EST HI 40 MIN: CPT | Mod: PBBFAC

## 2024-04-23 NOTE — PROGRESS NOTES
General Surgery  Clinic Note    Reason for Consultation:   Mass at posterior upper back    History of Present Illness:  53-year-old female presents for evaluation of an upper back mass. She states that she noticed this mass about 2017 and it was about 2cm.   It has been followed with serial ultrasounds last one in 2022 and it measured about 4cm. Now this mass has grown signfiicantly to about 10cm and it is causing her great discomfort. She denies any skin changes overlying it. She denies having any prior surgeries to this area. She does have a history of bradycardia during an episode of intubation for an allergic reaction for which she saw cardiolgoy. She also states she can't perform 4METS due to SOB. She recently had PFTS showing severe obstruction. She does not follow up with pulmonology.  Denies taking blood thinners.  States does not require oxygen at home and has been taking her inhalers.    Allergies:  Review of patient's allergies indicates:   Allergen Reactions    Penicillins Anaphylaxis and Swelling       Home Medications:  Current Outpatient Medications   Medication Sig Dispense Refill    albuterol (VENTOLIN HFA) 90 mcg/actuation inhaler Inhale 1 puff into the lungs every 4 (four) hours as needed for Wheezing or Shortness of Breath. Rescue 18 g 1    albuterol-ipratropium (DUO-NEB) 2.5 mg-0.5 mg/3 mL nebulizer solution Take 3 mLs by nebulization every 4 (four) hours. Rescue 75 mL 0    budesonide-formoterol 160-4.5 mcg (SYMBICORT) 160-4.5 mcg/actuation HFAA Inhale 2 puffs into the lungs every 12 (twelve) hours. Controller 10.2 g 3    cetirizine (ZYRTEC) 10 MG tablet Take 1 tablet (10 mg total) by mouth every evening. 90 tablet 3    ibuprofen (ADVIL,MOTRIN) 600 MG tablet Take 1 tablet (600 mg total) by mouth every 6 (six) hours as needed for Pain. 20 tablet 0    neomycin-polymyxin-hydrocortisone (CORTISPORIN) 3.5-10,000-1 mg/mL-unit/mL-% otic suspension Place 3 drops into the right ear 3 (three) times daily.  10 mL 0    rosuvastatin (CRESTOR) 20 MG tablet Take 1 tablet (20 mg total) by mouth once daily. 90 tablet 3    sumatriptan (IMITREX) 50 MG tablet Take 0.5 tablets (25 mg total) by mouth daily as needed for Migraine. May repeat x 1 dose in 2 hours if ineffective. 9 tablet 1    topiramate (TOPAMAX) 25 MG tablet TAKE 1 TABLET BY MOUTH AT BEDTIME FOR 7 DAYS THEN  INCREASE  TO  TWICE  A  DAY 60 tablet 3     No current facility-administered medications for this visit.       Past Medical History:   Diagnosis Date    Sensorineural hearing loss (SNHL) of both ears      Past Surgical History:   Procedure Laterality Date     SECTION      EAR RECONSTRUCTION Right 2012    HYSTERECTOMY       Family History   Problem Relation Name Age of Onset    Cancer Sister Michelle      Social History     Tobacco Use    Smoking status: Former     Current packs/day: 0.00     Average packs/day: 1 pack/day for 43.0 years (43.0 ttl pk-yrs)     Types: Cigarettes     Quit date: 1/15/2024     Years since quittin.2    Smokeless tobacco: Never   Substance Use Topics    Alcohol use: Not Currently     Comment: occasionally    Drug use: Yes     Frequency: 1.0 times per week     Types: Marijuana     Comment: Sometimes at night  to sleep        Review of Systems:  Constitutional: no fever or chills  Respiratory: positive for shortness of breath  Cardiovascular: no chest pain or palpitations  Gastrointestinal: no nausea or vomiting, no abdominal pain or change in bowel habits  Genitourinary: no hematuria or dysuria    OBJECTIVE:     Vital Signs:  Pulse: 87 (24 1113)  Resp: 18 (24 1113)  BP: 128/78 (24 1113)  SpO2: 98 % (24 1113)    Physical Exam:  NAD  Normal respiratory effort on room air  RRR  Abdomen soft, nontender, nondistended  Masses about 10 cm movable    Moving all extremities      ASSESSMENT:     54-year-old male presents with enlarging upper back mass    PLAN:     Explained to patient that her prior imaging did  show that mass is a lipoma however since the mass has significantly increased in size I would be concerned of a posibility of it being a liposarcoma. I would like to get an MRI to better delineate this mass. Due to her inability to perform 4METS I am referring her for cardiac preop risk stratification and pulmonology since she will require general anesthesia and likely prone position for mass removal. RTC in one month to discuss results from MRI and referrals.    Raissa Koch MD  General Surgery Resident PGY5

## 2024-04-25 ENCOUNTER — CLINICAL SUPPORT (OUTPATIENT)
Dept: AUDIOLOGY | Facility: HOSPITAL | Age: 54
End: 2024-04-25
Payer: MEDICAID

## 2024-04-25 DIAGNOSIS — H81.91: Primary | ICD-10-CM

## 2024-04-25 DIAGNOSIS — H90.6 MIXED CONDUCTIVE AND SENSORINEURAL HEARING LOSS OF BOTH EARS: ICD-10-CM

## 2024-04-25 DIAGNOSIS — H90.3 SENSORINEURAL HEARING LOSS, BILATERAL: Primary | ICD-10-CM

## 2024-04-25 DIAGNOSIS — R42 DYSEQUILIBRIUM: ICD-10-CM

## 2024-04-25 DIAGNOSIS — M79.89 SOFT TISSUE MASS: Primary | ICD-10-CM

## 2024-04-25 PROCEDURE — V5140 BEHIND EAR BINAUR HEARING AI: HCPCS | Performed by: AUDIOLOGIST

## 2024-04-25 PROCEDURE — V5160 DISPENSING FEE BINAURAL: HCPCS | Performed by: AUDIOLOGIST

## 2024-04-25 PROCEDURE — 92540 BASIC VESTIBULAR EVALUATION: CPT | Performed by: AUDIOLOGIST-HEARING AID FITTER

## 2024-04-25 PROCEDURE — 92537 CALORIC VSTBLR TEST W/REC: CPT | Performed by: AUDIOLOGIST-HEARING AID FITTER

## 2024-04-25 NOTE — PROGRESS NOTES
Call received from [facility] stating they can't perform this MRI here. I have placed new orders for ultrasound of mass with MRI at main campus.   Tried to call patient x3 no voicemail and no answer.

## 2024-04-25 NOTE — PROGRESS NOTES
Vestibular Evaluation      Patient is a 54 year old female presenting with symptoms of dysequilibrium.  The exact onset of symptoms is unknown, however believed to occur approximately 5-10 years prior.  Mrs. Dance endorses the sensation of dysequilibrium occasionally while walking, climbing up/down stairs and while driving in the city for a duration of minutes. She reportedly perceives the vehicle is veering towards the right.  Vertigo is denied as well as any vertiginous symptoms while changing positions and/or with melara head turns. She also denies any warning signs such as changes in hearing and/or tinnitus.  She was experiencing a worsening of headache activity, however the frequency and severity have decreased as of late. Changes in medication have been denied as well as any new medical diagnosis.     Vestibular Results:    Spontaneous: No spontaneous nystagmus noted for vision and vision denied condition.    Gaze:   No gaze-evoked nystagmus.    Saccades:   Normal saccade velocity, latency and accuracy.   Tracking: Normal tracking accuracy and gain.  OPK:    No asymmetry noted.   Positionals: Vertical (up beat) nystagmus noted for all test conditions with vision denied.  Positioning: No paroxysmal positional nystagmus noted.   Calorics: 72% right ear weakness    Caloric vestibular responses: RC 4, RW 0, LW 10, LC 14 deg/s.      Interpretations:      This patient's videonystagmogram was abnormal.  Oculomotor testing was within normal limits. Positional testing revealed vertical (up beat) nystagmus for all test conditions with vision denied. Positioning testing was negative for canalith involvement, bilaterally.  The caloric irrigation test revealed a 72% right ear caloric weakness, however non-mobile tympanograms noted.  Patient also has an extensive past surgical history to possibly include SSC occlusion and repair of right middle fossa encephalocele (as reported in 2018).  Todays findings indicative  of a right, compensated vestibular dysfunction as well as possible central nervous system involvement.    Recommendations:        Return to referring ENT for further follow up        Patrick Smith.  Clinical Audiologist

## 2024-04-25 NOTE — PROGRESS NOTES
Hearing Aid Fitting    Date of fittin24    Make/model Phonak Audeo L50-R   Right SN [id number]   Left SN [id number]    length/size 1L/R - standard   Dome  Small/vented   Battery rechargeable   Warranty 29   Accessory N/a     Fitting Note: Did well with all aspects of device. Very pleased with sound quality. Started at 90% and set to auto-acclimate to 100% in 2 weeks. Connected to phone successfully.    Recommendation: 2 week follow up scheduled    Maryann Merritt, Paddy    **Prior authorization document scanned in**

## 2024-04-30 NOTE — PROGRESS NOTES
"Patient Name: Glenda Huval Dance   YOB: 1970     Chief Complaint:   Chief Complaint   Patient presents with    Follow-up     Discuss Results MRI/US        History of Present Illness:  46 yo female referred for evaluation of a thyroid nodule and hearing loss. In October, patient suffered an episode of angioedema after taking Augmentin and required 5 days of intubation. At that time a scan was obtained and she was incidentally found to have thyroid nodules measuring up to 1.2 cm. She also has a longstanding history of hearing loss. She states that since she was a kid she has had left sided hearing loss, now has some in the right. Had pulsatile tinnitus in the right and underwent repair of what sounds like an encephalocele by Dr. Villalobos (?) in [place] about 8-9 years ago. Was suppose to have surgery on the left and "rods" placed in her ears (? OCR or stapes) but lost her insurance. Last audio was 7 years ago. Patient also complains of constantly needing to clear her throat and persistent hoarseness since her intubation. Per record intubation was uneventful and a 7.5 ETT was used.    1/23/18: Improved hoarseness since last visit, did not see ST or pick-up PPI. Denies otalgia, odynophagia, dysphagia, dyspnea, new masses/lesions. Weight stable. Unable to perform audio 2/2 cerumen impaction. Thyroid US shows 2 solid nodules on left, amenable to FNA. Has appointment on 1/30/18 with IR per patient; received a message yesterday.    5/17/18: Here for f/u. Pt had surgery with Rosa Elena and Nidhi in [facility] a few years ago. 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 She has persistent right CHL and worse LEft CHL. 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 Was going to get BAHA with rosa elena but lost insurance.  Shes unsure why she is here today     1/10/19: Here for f/u for CHL and thyroid nodules. Hearing stable subjectively. Had episode of spinning " dizziness exacerbated by head movement on 12/26/18, but this resolved and she hasn't had any spinning since.     4/25/19: 49yoF smoker that presents for evaluation for FNA. She had a thyroid US in 2018 that showed a 2cm left superior pole nodule and 1.5cm inferior pole nodule. She notes globus sensation and weird sensation in throat. Intermittent dysphonia worst in the AM. PND. Thick mucus/phlegm that requires clearing her throat/coughing often. Discomfort in midline chest intermittently. Not on PPI. Most recent TFTs were WNL. Denies dyspnea, dysphagia, weight loss. Smokes 1-1.5PPD     2/11/20: Here for annual follow-up with audio. Audio stable. Patient reports dizziness that occurred a few months ago with spinning, nausea, vomiting exacerbated with head movements. This resolved and since she has had dizziness with eye movement at rest, with ambulation or driving. Last seconds. Feels that her vision becomes blurry. Feels hearing is stable, no otalgia, otorrhea, aural fullness or changes in tinnitus. Current smoker.     April 26, 2022: Patient presents today for follow-up of mass involving her right posterior neck. In reviewing the patient's chart patient was found to have probable lipoma involving the right posterior neck. Patient had a CT scan of the neck done on October 5, 2017, and it showed findings of a 3.6 x 2.2 cm mass in the right posterior neck and subcutaneous tissue which would be consistent with a lipoma. An ultrasound of the soft tissue of the neck was then done on March 7, 2018, and showed the lesion had increased in size to 5.2 x 5.0 x 1.8cm.  Since that time the patient has felt that the lesion has at least doubled in size. She does experience some occipital headaches on that side as well as some soreness in the neck and she is not sure if it is related to this lesion. She has no weakness or paresthesias involving the upper extremities or shoulder. No tenderness. No drainage.  Also of note she does  have a history of hearing loss and had undergone repair of an encephalocele and right superior semicircular canal occlusion via a right middle fossa approach by Dr. Ng several years ago. She had a persistent right conductive hearing loss and a left hearing loss. Records indicate that she also likely had a dehiscence on the left with an encephalocele. She was to get a BAHA hearing aid with Dr. Villalobos, but had lost her insurance. Her last audiogram was in February 2020.    July 20, 2022: 52-year-old female presents today for follow-up of suspected lipoma the right posterior neck and hearing loss with history of superior semicircular canal dehiscence.  In regards to her lipoma she has not noticed any change since her last appointment.  She had a CT scan of the soft tissues of the neck with contrast done on July 14, 2022, which showed the presence of a 4.8 x 7.9 x 5.4 cm subcutaneous soft tissue mass with consistency of a lipoma in the right posterior neck.  Previously on a CT scan done on October 5, 2017 it measured 2.3 x 3.8 x 2.4 cm.  On ultrasound done on Brie 3, 2022, the lesion measured 9.1 x 8.5 x 3.2 cm.  Lesion appeared to be confined to the subcutaneous tissue and did not invade any underlying muscle.  Radiologist indicated there were no concerning factors.  Results were discussed with the patient today.  The patient is still having some discomfort in her neck and occipital region as previously described.  Audiogram was done on May 16, 2022, and this showed the presence of a moderate to severe mixed hearing loss in the right ear at 250-8000 hertz and in the left ear a moderate to severe mixed hearing loss at 250-8000 hertz.  Speech reception threshold was 50 decibels in the right ear and 60 decibels in left ear with 100% discrimination in the right ear 96% discrimination in the left ear.  She had type B tympanograms bilaterally.  On examination of the ears on the CT scan of the neck she does not appear to  have middle ear effusions.      3/11/24: Referred back from audiology for hearing aid clearance. States she still desires to have neck mass removed. Reports that her sister got very sick & was in ICU in [place] last year, and she was lost to f/u related to that and due to losing her insurance. She feels that the mass is continuing to grow. She has grown her hair long to try and disguise it. C/o frequent and debilitating headaches for which she has taken fioricet without improvement and was then (last week) placed on imitrex and topamax daily. She has not noticed any improvement yet. Admits to taking BC powder frequently. Headaches have been ongoing for years but seem to be getting worse over the past 1-2 years. States she has had migraines but that this is different. They begin with a shooting/stabbing pain behind her right eye and radiate to her head and down to occiput. She feels like they might be r/t her sinuses. Endorses nasal congestion for which she uses OTC nasal spray occasionally. No hx of recurrent sinus infections. Also c/o frequent dysequilibrium described as things appear moving when she knows they are not. This seems to be associated with quick eye movements in any direction. It often occurs when she looks up from her phone, when she uses stairs, and when she is driving. Episodes last minutes and have been present for many years. Denies falls or head trauma. States she usually does not mention it because she feels crazy describing it.    24: Reports she is extremely happy with hearing aids. Is awaiting pulm and cards clearance to have back mass removal by general surgery. No change in dizziness. Headaches have improved but not completely resolved, however, this is not associated with dizziness.         Past Medical History:  Past Medical History:   Diagnosis Date    Sensorineural hearing loss (SNHL) of both ears      Past Surgical History:   Procedure Laterality Date     SECTION       "EAR RECONSTRUCTION Right 2012    HYSTERECTOMY         Review of Systems:  Unremarkable except as mentioned above.    Current Medications:  No current outpatient medications on file.     No current facility-administered medications for this visit.        Allergies:  Review of patient's allergies indicates:   Allergen Reactions    Penicillins Anaphylaxis        Physical Exam:  Vital signs:   Vitals:    07/20/22 0938   BP: 125/77   BP Location: Right arm   Patient Position: Sitting   BP Method: Medium (Automatic)   Pulse: 61   Resp: 16   Temp: 99 °F (37.2 °C)   TempSrc: Oral   Weight: 69.4 kg (153 lb)   Height: 5' 1" (1.549 m)     General:  Well-developed well-nourished female in no acute distress.  Voice is normal.  Head and face:  Normocephalic.  No facial lesions.  No temporomandibular joint tenderness or click.  Ears:  Right ear-auricle is normally developed.  External auditory canal is clear.  Tympanic membrane is nonerythematous.  No middle ear effusion.  Left ear-auricle is normally developed.  External auditory canal is clear.  Tympanic membrane is nonerythematous.  No middle ear effusion.  Nose:  Nasal dorsum unremarkable.  No rhinorrhea.  Neck:  Supple without palpable adenopathy.  Patient has a 12+ cm mobile nontender subcutaneous mass involving the right lower posterior neck/trapezius region.  No overlying erythema.  Trachea midline.  No palpable thyromegaly.  Parotid and submandibular are normal to palpation.  Eyes:  Extraocular muscles intact.  No nystagmus.  No exophthalmos or enophthalmos.  Neurologic:  Alert and oriented.  Cranial nerves 2-12 are grossly normal.    Mass/suspected lipoma:       Imaging:     -----------------------------------------------------------------------------------------------------------    -------------------------------------------------------------------------------------------------------      Audio:         Assessment/Plan:   54-year-old female with right posterior neck " mass which is suspected to be a lipoma. 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 Villalobos). Audio essentially stable. Also with dysequilibrium, neck pain, AR/nasal congestion. VNG with compensated right vestibular weakness, possible CNS component- reviewed. CT temporal bones 2/2018 with b/l tegmen dehiscence. CT neck 7/2022 unremarkable for chronic sinus dz. CT head 3/18/24 & MRI IAC 4/3/24 stable from previous- reviewed with Dr. Foster.     - Continue amplification  - Annual audio  - Flonase BID for nasal congestion/rhinits   - Referral to neurology for central dizziness  - RTC 1 yr, sooner kj Kuhn NP

## 2024-05-02 ENCOUNTER — TELEPHONE (OUTPATIENT)
Dept: SURGERY | Facility: CLINIC | Age: 54
End: 2024-05-02
Payer: MEDICAID

## 2024-05-02 NOTE — TELEPHONE ENCOUNTER
Fax received from Dr. Martinez, Pulmonologist, stating that patient has an appointment on 5/22/24 @ 8:30 am.       ----- Message -----  From: Shayna Lane RN  Sent: 4/24/2024   2:09 PM CDT  To: Raissa Copeland MD  Subject: Pulm Referral                                    Good Afternoon Dr. Copeland,    Thank you for the referral to Pulmonology clinic. Just wanted to let you know that the patient contacted our clinic to schedule an appointment for pulmonary clearance. Patient was informed by PAR that her referral will be processed for provider review. She was also informed that our next available date is end of November 2024. Patient stated she wanted the referral to be cancelled at this time since we will not be able to see her within 1 month. PAR let her know that Dr. Mynor Martinez in [place] may be able to see her within that 1 month timeframe for Pulmonary clearance.    Will cancel referral per patient's request.     Thank you for reaching out! Hope you have a great day!    Shayna CARRION RN  Pulmonary

## 2024-05-04 DIAGNOSIS — R06.02 SOBOE (SHORTNESS OF BREATH ON EXERTION): ICD-10-CM

## 2024-05-06 DIAGNOSIS — R06.02 SOBOE (SHORTNESS OF BREATH ON EXERTION): ICD-10-CM

## 2024-05-06 RX ORDER — ALBUTEROL SULFATE 90 UG/1
2 AEROSOL, METERED RESPIRATORY (INHALATION) EVERY 4 HOURS PRN
Qty: 18 G | Refills: 2 | Status: SHIPPED | OUTPATIENT
Start: 2024-05-06

## 2024-05-06 RX ORDER — ALBUTEROL SULFATE 90 UG/1
AEROSOL, METERED RESPIRATORY (INHALATION)
Qty: 18 G | Refills: 1 | Status: SHIPPED | OUTPATIENT
Start: 2024-05-06 | End: 2024-05-06 | Stop reason: CLARIF

## 2024-05-06 RX ORDER — ALBUTEROL SULFATE 90 UG/1
AEROSOL, METERED RESPIRATORY (INHALATION)
Qty: 18 G | Refills: 1 | OUTPATIENT
Start: 2024-05-06

## 2024-05-06 NOTE — TELEPHONE ENCOUNTER
please send rx for brand ventolin, ins does not cover generic.   NOV:7/9/24  LOV:4/4/24  Pharmacy requesting different brand

## 2024-05-07 ENCOUNTER — CLINICAL SUPPORT (OUTPATIENT)
Dept: AUDIOLOGY | Facility: HOSPITAL | Age: 54
End: 2024-05-07
Payer: MEDICAID

## 2024-05-07 ENCOUNTER — OFFICE VISIT (OUTPATIENT)
Dept: OTOLARYNGOLOGY | Facility: CLINIC | Age: 54
End: 2024-05-07
Payer: MEDICAID

## 2024-05-07 VITALS — DIASTOLIC BLOOD PRESSURE: 86 MMHG | TEMPERATURE: 99 F | SYSTOLIC BLOOD PRESSURE: 146 MMHG | HEART RATE: 51 BPM

## 2024-05-07 DIAGNOSIS — R09.81 NASAL CONGESTION: ICD-10-CM

## 2024-05-07 DIAGNOSIS — H83.8X3 SUPERIOR SEMICIRCULAR CANAL DEHISCENCE OF BOTH EARS: ICD-10-CM

## 2024-05-07 DIAGNOSIS — H90.3 SENSORINEURAL HEARING LOSS, BILATERAL: Primary | ICD-10-CM

## 2024-05-07 DIAGNOSIS — H90.6 MIXED CONDUCTIVE AND SENSORINEURAL HEARING LOSS OF BOTH EARS: ICD-10-CM

## 2024-05-07 DIAGNOSIS — R42 DYSEQUILIBRIUM: ICD-10-CM

## 2024-05-07 DIAGNOSIS — R42 DIZZINESS: Primary | ICD-10-CM

## 2024-05-07 PROCEDURE — 99214 OFFICE O/P EST MOD 30 MIN: CPT | Mod: PBBFAC | Performed by: NURSE PRACTITIONER

## 2024-05-07 PROCEDURE — 1159F MED LIST DOCD IN RCRD: CPT | Mod: CPTII,,, | Performed by: NURSE PRACTITIONER

## 2024-05-07 PROCEDURE — 92593 HC HEARING AID CHECK, BOTH EARS: CPT | Performed by: AUDIOLOGIST

## 2024-05-07 PROCEDURE — 99214 OFFICE O/P EST MOD 30 MIN: CPT | Mod: S$PBB,,, | Performed by: NURSE PRACTITIONER

## 2024-05-07 PROCEDURE — 3044F HG A1C LEVEL LT 7.0%: CPT | Mod: CPTII,,, | Performed by: NURSE PRACTITIONER

## 2024-05-07 PROCEDURE — 3077F SYST BP >= 140 MM HG: CPT | Mod: CPTII,,, | Performed by: NURSE PRACTITIONER

## 2024-05-07 PROCEDURE — 3079F DIAST BP 80-89 MM HG: CPT | Mod: CPTII,,, | Performed by: NURSE PRACTITIONER

## 2024-05-07 RX ORDER — FLUTICASONE PROPIONATE 50 MCG
2 SPRAY, SUSPENSION (ML) NASAL 2 TIMES DAILY
COMMUNITY
Start: 2024-04-18

## 2024-05-07 NOTE — PROGRESS NOTES
Hearing aid check:    Ms. Dance is in for her 2 week follow up from initial fitting. Overall doing really well. No issues with sound quality reported. She now has a new phone and we were able to connect it to devices successfully. No adjustments required. Listening check indicates hearing aids are in good working condition.  Will follow up annually but instructed to call if problems arise.    Maryann Merritt, AuD

## 2024-05-10 ENCOUNTER — OFFICE VISIT (OUTPATIENT)
Dept: CARDIOLOGY | Facility: CLINIC | Age: 54
End: 2024-05-10
Payer: MEDICAID

## 2024-05-10 VITALS
HEART RATE: 49 BPM | TEMPERATURE: 97 F | RESPIRATION RATE: 18 BRPM | WEIGHT: 177.38 LBS | SYSTOLIC BLOOD PRESSURE: 150 MMHG | DIASTOLIC BLOOD PRESSURE: 82 MMHG | HEIGHT: 61 IN | OXYGEN SATURATION: 99 % | BODY MASS INDEX: 33.49 KG/M2

## 2024-05-10 DIAGNOSIS — R00.1 BRADYCARDIA: ICD-10-CM

## 2024-05-10 DIAGNOSIS — R22.2 LOCALIZED SWELLING, MASS AND LUMP, TRUNK: ICD-10-CM

## 2024-05-10 DIAGNOSIS — Z01.810 PREOPERATIVE CARDIOVASCULAR EXAMINATION: Primary | ICD-10-CM

## 2024-05-10 DIAGNOSIS — R06.09 DYSPNEA ON EXERTION: ICD-10-CM

## 2024-05-10 DIAGNOSIS — I10 HYPERTENSION, UNSPECIFIED TYPE: ICD-10-CM

## 2024-05-10 DIAGNOSIS — R06.01 ORTHOPNEA: ICD-10-CM

## 2024-05-10 PROCEDURE — 99215 OFFICE O/P EST HI 40 MIN: CPT | Mod: PBBFAC | Performed by: INTERNAL MEDICINE

## 2024-05-10 NOTE — PROGRESS NOTES
Cardiology clinic    CHIEF COMPLAINT:   Chief Complaint   Patient presents with    New referral for cardiac risk assessment      Complains of fatigue, SOB, leg pain with ambulation, dizziness/lightheadedness. Quit smoking 4 months ago.                   Review of patient's allergies indicates:   Allergen Reactions    Penicillins Anaphylaxis and Swelling                                          HPI:  Glenda Huval Dance 54 y.o. female  PMH mixed conductive and SNHL bilaterally, lipoma of neck, COPD presents to Cardiology Clinic for cardiovascular risk stratification for removal mass on her upper back.  Mass 1st noticed in 2017 has progressively enlarged since then.  Patient has history of angioedema following administration of Augmentin in 2017 requiring intubation.  Following intubation at that time patient became bradycardic and hypotensive, which improved with decreased propofol.  Patient ultimately required 2 doses of atropine.  Patient was extubated and heart rate improved.    Patient unable to ambulate 1 block due to lower extremity claudication and shortness of breath that she relates to COPD.  Denies any chest pain with exertion.  Also endorses 4 pillow orthopnea, states it feels like she is smothering when lying down flat.  Stopped smoking tobacco 4 months ago.      Family history:  Father had myocardial infarction at age 55.  Patient states she recently started seeing a physician 4 months ago, had not seen 1 prior.                                                                                                                                                                                                                                                                                                                                                                                                                                                                                        CARDIAC TESTING:  Results for  orders placed in visit on 12    Echo    Narrative  See Cardiologist Report    Transcribed By: ASIF          Date/Time: 10/12/2012 08:46    Electronically Signed By: Destin Dior  Date/Time Signed: 10/12/2012 13:03    No results found for this or any previous visit.     No results found for this or any previous visit.       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    Cough    Chronic obstructive pulmonary disease    Class 1 obesity due to excess calories with serious comorbidity and body mass index (BMI) of 33.0 to 33.9 in adult    Tobacco abuse    Mixed hyperlipidemia    Proteinuria    IFG (impaired fasting glucose)     Past Surgical History:   Procedure Laterality Date     SECTION  2001    EAR RECONSTRUCTION Right 2012    HYSTERECTOMY       Social History     Socioeconomic History    Marital status:    Tobacco Use    Smoking status: Former     Current packs/day: 0.00     Average packs/day: 1 pack/day for 43.0 years (43.0 ttl pk-yrs)     Types: Cigarettes     Quit date: 1/15/2024     Years since quittin.3    Smokeless tobacco: Never   Substance and Sexual Activity    Alcohol use: Not Currently     Comment: occasionally    Drug use: Yes     Frequency: 1.0 times per week     Types: Marijuana     Comment: Sometimes at night  to sleep    Sexual activity: Yes     Partners: Male     Birth control/protection: None     Social Determinants of Health     Financial Resource Strain: Medium Risk (2024)    Overall Financial Resource Strain (CARDIA)     Difficulty of Paying Living Expenses: Somewhat hard   Food Insecurity: Food Insecurity Present (2024)    Hunger Vital Sign     Worried About Running Out of Food in the Last Year: Sometimes true     Ran Out of Food in the Last Year: Sometimes true   Transportation Needs: No Transportation Needs (2024)     PRAPARE - Transportation     Lack of Transportation (Medical): No     Lack of Transportation (Non-Medical): No   Physical Activity: Inactive (4/4/2024)    Exercise Vital Sign     Days of Exercise per Week: 0 days     Minutes of Exercise per Session: 0 min   Stress: No Stress Concern Present (4/4/2024)    [employer] - Occupational Stress Questionnaire     Feeling of Stress : Not at all   Recent Concern: Stress - Stress Concern Present (1/18/2024)    [employer] - Occupational Stress Questionnaire     Feeling of Stress : To some extent   Housing Stability: High Risk (4/4/2024)    Housing Stability Vital Sign     Unable to Pay for Housing in the Last Year: Yes     Number of Places Lived in the Last Year: 1     Unstable Housing in the Last Year: No        Family History   Problem Relation Name Age of Onset    Cancer Sister Michelle          Current Outpatient Medications:     albuterol (VENTOLIN HFA) 90 mcg/actuation inhaler, Inhale 2 puffs into the lungs every 4 (four) hours as needed for Wheezing or Shortness of Breath. Rescue, Disp: 18 g, Rfl: 2    albuterol-ipratropium (DUO-NEB) 2.5 mg-0.5 mg/3 mL nebulizer solution, Take 3 mLs by nebulization every 4 (four) hours. Rescue, Disp: 75 mL, Rfl: 0    budesonide-formoterol 160-4.5 mcg (SYMBICORT) 160-4.5 mcg/actuation HFAA, Inhale 2 puffs into the lungs every 12 (twelve) hours. Controller, Disp: 10.2 g, Rfl: 3    fluticasone propionate (FLONASE) 50 mcg/actuation nasal spray, 2 sprays by Each Nostril route 2 (two) times daily., Disp: , Rfl:     ibuprofen (ADVIL,MOTRIN) 600 MG tablet, Take 1 tablet (600 mg total) by mouth every 6 (six) hours as needed for Pain., Disp: 20 tablet, Rfl: 0    rosuvastatin (CRESTOR) 20 MG tablet, Take 1 tablet (20 mg total) by mouth once daily., Disp: 90 tablet, Rfl: 3    sumatriptan (IMITREX) 50 MG tablet, Take 0.5 tablets (25 mg total) by mouth daily as needed for Migraine. May repeat x 1  "dose in 2 hours if ineffective., Disp: 9 tablet, Rfl: 1    topiramate (TOPAMAX) 25 MG tablet, TAKE 1 TABLET BY MOUTH AT BEDTIME FOR 7 DAYS THEN  INCREASE  TO  TWICE  A  DAY, Disp: 60 tablet, Rfl: 3    cetirizine (ZYRTEC) 10 MG tablet, Take 1 tablet (10 mg total) by mouth every evening. (Patient not taking: Reported on 5/10/2024), Disp: 90 tablet, Rfl: 3    neomycin-polymyxin-hydrocortisone (CORTISPORIN) 3.5-10,000-1 mg/mL-unit/mL-% otic suspension, Place 3 drops into the right ear 3 (three) times daily. (Patient not taking: Reported on 5/7/2024), Disp: 10 mL, Rfl: 0     ROS:                                                                                                                                                                             Constitutional: no fever/chills  EENT: no sore throat, ear pain, sinus pain/congestion, nasal congestion/drainage  Respiratory: no cough, no wheezing, + shortness of breath  Cardiovascular: no chest pain, no palpitations, no edema, + orthopnea  Gastrointestinal: no nausea, vomiting, or diarrhea. No abdominal pain  Neurologic: no headache, + dizziness, no weakness or numbness     Blood pressure (!) 150/82, pulse (!) 49, temperature 97.4 °F (36.3 °C), resp. rate 18, height 5' 1.42" (1.56 m), weight 80.5 kg (177 lb 6.4 oz), SpO2 99%.   PE:  General: well-developed, well-nourished, no acute distress  Eye: clear conjunctiva, eyelids normal  Head: normocephalic and atraumatic  Neck: full range of motion, supple  Respiratory:  Decreased breath sounds bilaterally without wheezes, rales, rhonchi  Cardiovascular:  Systolic murmur, bradycardic, regular rhythm. No JVD.  No lower extremity edema.  Gastrointestinal: soft, non-tender, non-distended with normal bowel sounds in all four quadrants. No masses palpated  Musculoskeletal: full range of motion of all extremities without limitation or discomfort  Integumentary: large mass present upper back, mobile.  Neurologic: AAO x 3, no " dysarthria.  Psychiatric: cooperative with exam, good eye contact.            ASSESSMENT/PLAN:  Preoperative cardiovascular risk stratification  Bradycardia  -<4 METs, patient unable to ambulate more than 1 block due to lower extremity claudication and dyspnea on exertion.  Patient also endorses 4 pillow orthopnea.  -most recent echo 2018 with EF 51%, mild aortic stenosis.    -need further studies prior to risk stratification given symptoms and family history.    -ordered nuclear stress test, echo.   -Holter monitor to evaluate bradycardia.    Hypertension  -BP today 150/82.    -patient states blood pressure has been elevated since going to the physician over the past 4 months.    -provided patient with BP log, instructed her to log daily readings.  -add antihypertensive if home logs consistently elevated.  -advised patient to call in home blood pressure readings in 1-2 weeks.  -Counseled patient on low sodium diet.        RTC 4 months      Neal Alvarenga MD  [facility] Internal Medicine PGY-1

## 2024-05-10 NOTE — PATIENT INSTRUCTIONS
Follow up in  4 months or sooner if needed    Echocardiogram, stress test, and holter monitor to be scheduled.

## 2024-05-10 NOTE — PROGRESS NOTES
Cardiology Attending    I evaluated Glenda Huval Dance and discussed the patient's symptoms, findings, and management plan with the resident.  Please see the Cardiology note for details.

## 2024-05-14 ENCOUNTER — HOSPITAL ENCOUNTER (EMERGENCY)
Facility: HOSPITAL | Age: 54
Discharge: HOME | End: 2024-05-14
Attending: INTERNAL MEDICINE
Payer: MEDICAID

## 2024-05-14 VITALS
OXYGEN SATURATION: 97 % | BODY MASS INDEX: 32.8 KG/M2 | RESPIRATION RATE: 16 BRPM | DIASTOLIC BLOOD PRESSURE: 79 MMHG | HEART RATE: 58 BPM | WEIGHT: 176 LBS | SYSTOLIC BLOOD PRESSURE: 157 MMHG | TEMPERATURE: 98 F

## 2024-05-14 DIAGNOSIS — M79.662 BILATERAL CALF PAIN: ICD-10-CM

## 2024-05-14 DIAGNOSIS — M79.661 BILATERAL CALF PAIN: ICD-10-CM

## 2024-05-14 DIAGNOSIS — R20.2 ARM PARESTHESIA, LEFT: ICD-10-CM

## 2024-05-14 DIAGNOSIS — R07.89 CHEST TIGHTNESS: Primary | ICD-10-CM

## 2024-05-14 DIAGNOSIS — R03.0 ELEVATED BLOOD PRESSURE READING WITHOUT DIAGNOSIS OF HYPERTENSION: ICD-10-CM

## 2024-05-14 LAB
ALBUMIN SERPL-MCNC: 4.2 G/DL (ref 3.5–5)
ALBUMIN/GLOB SERPL: 1.3 RATIO (ref 1.1–2)
ALP SERPL-CCNC: 70 UNIT/L (ref 40–150)
ALT SERPL-CCNC: 33 UNIT/L (ref 0–55)
AST SERPL-CCNC: 22 UNIT/L (ref 5–34)
BASOPHILS # BLD AUTO: 0.06 X10(3)/MCL
BASOPHILS NFR BLD AUTO: 0.6 %
BILIRUB SERPL-MCNC: 0.8 MG/DL
BNP BLD-MCNC: 88.9 PG/ML
BUN SERPL-MCNC: 14.8 MG/DL (ref 9.8–20.1)
CALCIUM SERPL-MCNC: 10.1 MG/DL (ref 8.4–10.2)
CHLORIDE SERPL-SCNC: 109 MMOL/L (ref 98–107)
CO2 SERPL-SCNC: 25 MMOL/L (ref 22–29)
CREAT SERPL-MCNC: 0.86 MG/DL (ref 0.55–1.02)
D DIMER PPP IA.FEU-MCNC: 0.3 UG/ML FEU (ref 0–0.5)
EOSINOPHIL # BLD AUTO: 0.17 X10(3)/MCL (ref 0–0.9)
EOSINOPHIL NFR BLD AUTO: 1.6 %
ERYTHROCYTE [DISTWIDTH] IN BLOOD BY AUTOMATED COUNT: 12.5 % (ref 11.5–17)
GFR SERPLBLD CREATININE-BSD FMLA CKD-EPI: >60 ML/MIN/1.73/M2
GLOBULIN SER-MCNC: 3.2 GM/DL (ref 2.4–3.5)
GLUCOSE SERPL-MCNC: 107 MG/DL (ref 74–100)
HCT VFR BLD AUTO: 40.5 % (ref 37–47)
HGB BLD-MCNC: 14.4 G/DL (ref 12–16)
HOLD SPECIMEN: NORMAL
IMM GRANULOCYTES # BLD AUTO: 0.04 X10(3)/MCL (ref 0–0.04)
IMM GRANULOCYTES NFR BLD AUTO: 0.4 %
LYMPHOCYTES # BLD AUTO: 3 X10(3)/MCL (ref 0.6–4.6)
LYMPHOCYTES NFR BLD AUTO: 29.1 %
MCH RBC QN AUTO: 33 PG (ref 27–31)
MCHC RBC AUTO-ENTMCNC: 35.6 G/DL (ref 33–36)
MCV RBC AUTO: 92.7 FL (ref 80–94)
MONOCYTES # BLD AUTO: 0.53 X10(3)/MCL (ref 0.1–1.3)
MONOCYTES NFR BLD AUTO: 5.1 %
NEUTROPHILS # BLD AUTO: 6.51 X10(3)/MCL (ref 2.1–9.2)
NEUTROPHILS NFR BLD AUTO: 63.2 %
NRBC BLD AUTO-RTO: 0 %
PLATELET # BLD AUTO: 343 X10(3)/MCL (ref 130–400)
PMV BLD AUTO: 9.5 FL (ref 7.4–10.4)
POTASSIUM SERPL-SCNC: 4 MMOL/L (ref 3.5–5.1)
PROT SERPL-MCNC: 7.4 GM/DL (ref 6.4–8.3)
RBC # BLD AUTO: 4.37 X10(6)/MCL (ref 4.2–5.4)
SODIUM SERPL-SCNC: 142 MMOL/L (ref 136–145)
TROPONIN I SERPL-MCNC: <0.01 NG/ML (ref 0–0.04)
WBC # SPEC AUTO: 10.31 X10(3)/MCL (ref 4.5–11.5)

## 2024-05-14 PROCEDURE — 84484 ASSAY OF TROPONIN QUANT: CPT | Performed by: NURSE PRACTITIONER

## 2024-05-14 PROCEDURE — 80053 COMPREHEN METABOLIC PANEL: CPT | Performed by: NURSE PRACTITIONER

## 2024-05-14 PROCEDURE — 93005 ELECTROCARDIOGRAM TRACING: CPT

## 2024-05-14 PROCEDURE — 85025 COMPLETE CBC W/AUTO DIFF WBC: CPT | Performed by: NURSE PRACTITIONER

## 2024-05-14 PROCEDURE — 85379 FIBRIN DEGRADATION QUANT: CPT | Performed by: PHYSICIAN ASSISTANT

## 2024-05-14 PROCEDURE — 25000003 PHARM REV CODE 250: Performed by: PHYSICIAN ASSISTANT

## 2024-05-14 PROCEDURE — 83880 ASSAY OF NATRIURETIC PEPTIDE: CPT | Performed by: NURSE PRACTITIONER

## 2024-05-14 PROCEDURE — 99285 EMERGENCY DEPT VISIT HI MDM: CPT | Mod: 25

## 2024-05-14 RX ORDER — AMLODIPINE BESYLATE 5 MG/1
5 TABLET ORAL DAILY
Qty: 30 TABLET | Refills: 0 | Status: SHIPPED | OUTPATIENT
Start: 2024-05-14 | End: 2024-05-31 | Stop reason: SDUPTHER

## 2024-05-14 RX ORDER — AMLODIPINE BESYLATE 5 MG/1
5 TABLET ORAL
Status: COMPLETED | OUTPATIENT
Start: 2024-05-14 | End: 2024-05-14

## 2024-05-14 RX ORDER — METHOCARBAMOL 750 MG/1
1500 TABLET, FILM COATED ORAL 3 TIMES DAILY PRN
Qty: 30 TABLET | Refills: 0 | Status: SHIPPED | OUTPATIENT
Start: 2024-05-14 | End: 2024-05-24

## 2024-05-14 RX ADMIN — AMLODIPINE BESYLATE 5 MG: 5 TABLET ORAL at 03:05

## 2024-05-14 NOTE — FIRST PROVIDER EVALUATION
Medical screening examination initiated.  I have conducted a focused provider triage encounter, findings are as follows:    Brief history of present illness:   CP, leg pain    There were no vitals filed for this visit.    Pertinent physical exam:  deferred    Brief workup plan:  cardiac workup    Preliminary workup initiated; this workup will be continued and followed by the physician or advanced practice provider that is assigned to the patient when roomed.

## 2024-05-14 NOTE — DISCHARGE INSTRUCTIONS
Report to Emergency Department if symptoms return or worsen; [facility] - Medicine Clinic Within 1 to 2 days, It is important that you follow up with your primary care provider or specialist if indicated for further evaluation, workup, and treatment as necessary. The exam and treatment you received in Emergency Department was for an urgent problem and NOT INTENDED AS COMPLETE CARE. It is important that you FOLLOW UP with a doctor for ongoing care. If your symptoms become WORSE or you DO NOT IMPROVE and you are unable to reach your health care provider, you should RETURN to the Emergency Department. The Emergency Department provider has provided a PRELIMINARY INTERPRETATION of all your studies. A final interpretation may be done after you are discharged. If a change in your diagnosis or treatment is needed WE WILL CONTACT YOU. It is critical that we have a CURRENT PHONE NUMBER FOR YOU.

## 2024-05-14 NOTE — ED PROVIDER NOTES
Encounter Date: 2024       History     Chief Complaint   Patient presents with    Leg Pain     PT IS HARD OF HEARING.  IN /AASI W CO BILAT LOWER LEG PAIN, SOB/CP W TINGLING TO LT ARM AND ELEVATED BP X 2 DAYS.  EKG OBTAINED.      54-year-old female with past medical history significant for COPD, former smoker, hyperlipidemia and obesity presents to ED complaining of 2 day history of malaise, bilateral lower leg pain, chest tightness and paresthesia to left arm.  Patient also reports that her blood pressures have been elevated at home with diastolic pressures greater than 100.  Patient describes malaise as simply not feeling right.  Patient reports she often gets claudication like pain in her bilateral calves when walking, but reports yesterday and this morning she had that pain even at rest.  Denies any calf swelling or lower extremity edema.  When asked, patient does endorse mild headache, but declines having the worst headache of her life.  Reports that she has breathing treatments at home, but states she has not felt short of breath and therefore has not used them, against stating she simply does not feel right.  Denies dizziness, confusion, vision changes, slurred speech, lower extremity numbness/paresthesia, focal weakness, paralysis, ataxia, abnormal balance, palpitations, diaphoresis, syncope, increased cough, hemoptysis, abdominal pain, nausea, vomiting, diarrhea, blood in stool, vaginal bleeding, fever, chills.  Patient is hypertensive on arrival with blood pressure of 167/96.  Vitals otherwise within normal limits, patient in no acute distress.        Review of patient's allergies indicates:   Allergen Reactions    Penicillins Anaphylaxis and Swelling     Past Medical History:   Diagnosis Date    COPD (chronic obstructive pulmonary disease)     Hyperlipidemia     Sensorineural hearing loss (SNHL) of both ears      Past Surgical History:   Procedure Laterality Date     SECTION      EAR  RECONSTRUCTION Right 2012    HYSTERECTOMY       Family History   Problem Relation Name Age of Onset    Cancer Sister Michelle      Social History     Tobacco Use    Smoking status: Former     Current packs/day: 0.00     Average packs/day: 1 pack/day for 43.0 years (43.0 ttl pk-yrs)     Types: Cigarettes     Quit date: 1/15/2024     Years since quittin.3    Smokeless tobacco: Never   Substance Use Topics    Alcohol use: Not Currently     Comment: occasionally    Drug use: Yes     Frequency: 1.0 times per week     Types: Marijuana     Comment: Sometimes at night  to sleep     Review of Systems   All other systems reviewed and are negative.      Physical Exam     Initial Vitals [24 1214]   BP Pulse Resp Temp SpO2   (!) 167/96 60 20 97.9 °F (36.6 °C) 96 %      MAP       --         Physical Exam    Nursing note and vitals reviewed.  Constitutional: She appears well-developed and well-nourished. She is not diaphoretic. No distress.   HENT:   Head: Normocephalic and atraumatic.   Nose: Nose normal.   Mouth/Throat: Oropharynx is clear and moist. No oropharyngeal exudate.   Eyes: Conjunctivae and EOM are normal. Pupils are equal, round, and reactive to light. No scleral icterus.   Neck: Neck supple.   Normal range of motion.  Cardiovascular:  Normal rate, regular rhythm, normal heart sounds and intact distal pulses.     Exam reveals no gallop and no friction rub.       No murmur heard.  Pulses:       Radial pulses are 2+ on the right side and 2+ on the left side.        Dorsalis pedis pulses are 2+ on the right side and 2+ on the left side.   Pulmonary/Chest: Breath sounds normal. No respiratory distress. She has no wheezes. She has no rhonchi. She has no rales.   Abdominal: Abdomen is soft. Bowel sounds are normal. She exhibits no distension, no abdominal bruit and no pulsatile midline mass. There is no abdominal tenderness.   No right CVA tenderness.  No left CVA tenderness. There is no rebound and no guarding.    Musculoskeletal:         General: No tenderness or edema. Normal range of motion.      Cervical back: Normal range of motion and neck supple.     Neurological: She is alert and oriented to person, place, and time. She has normal strength. No cranial nerve deficit or sensory deficit. GCS score is 15. GCS eye subscore is 4. GCS verbal subscore is 5. GCS motor subscore is 6.   Skin: Skin is warm and dry. Capillary refill takes less than 2 seconds. No rash noted. No pallor.   Psychiatric: She has a normal mood and affect. Thought content normal.         ED Course   Procedures  Labs Reviewed   COMPREHENSIVE METABOLIC PANEL - Abnormal; Notable for the following components:       Result Value    Chloride 109 (*)     Glucose 107 (*)     All other components within normal limits   CBC WITH DIFFERENTIAL - Abnormal; Notable for the following components:    MCH 33.0 (*)     All other components within normal limits   B-TYPE NATRIURETIC PEPTIDE - Normal   TROPONIN I - Normal   D DIMER, QUANTITATIVE - Normal   CBC W/ AUTO DIFFERENTIAL    Narrative:     The following orders were created for panel order CBC auto differential.  Procedure                               Abnormality         Status                     ---------                               -----------         ------                     CBC with Differential[[id number]]       Abnormal            Final result                 Please view results for these tests on the individual orders.   EXTRA TUBES    Narrative:     The following orders were created for panel order EXTRA TUBES.  Procedure                               Abnormality         Status                     ---------                               -----------         ------                     Red Top Hold[[id number]]                                    In process                   Please view results for these tests on the individual orders.   RED TOP HOLD     EKG Readings: (Independently Interpreted)   Initial  Reading: No STEMI. Previous EKG: Compared with most recent EKG Previous EKG Date: 2/21/24. Rhythm: Sinus Bradycardia. Heart Rate: 51. Ectopy: No Ectopy. Conduction: LBBB. ST Segments: Normal ST Segments. Axis: Normal. Clinical Impression: Sinus Bradycardia     ECG Results              EKG 12-lead (Chest Pain) Age >30 (In process)        Collection Time Result Time QRS Duration OHS QTC Calculation    05/14/24 12:43:03 05/14/24 12:46:54 142 422                     In process by Interface, Lab In [facility] (05/14/24 12:47:03)                   Narrative:    Test Reason : R07.9,    Vent. Rate : 051 BPM     Atrial Rate : 051 BPM     P-R Int : 182 ms          QRS Dur : 142 ms      QT Int : 458 ms       P-R-T Axes : 066 031 076 degrees     QTc Int : 422 ms    Sinus bradycardia with sinus arrhythmia  Left bundle branch block  Abnormal ECG  When compared with ECG of 21-FEB-2024 09:57,  No significant change was found    Referred By:             Confirmed By:                                   Imaging Results              X-Ray Chest PA And Lateral (Final result)  Result time 05/14/24 12:49:48      Final result by Davion Díaz MD (05/14/24 12:49:48)                   Impression:      No acute chest disease is identified.      Electronically signed by: Davion Díaz  Date:    05/14/2024  Time:    12:49               Narrative:    EXAMINATION:  XR CHEST PA AND LATERAL    CLINICAL HISTORY:  , Chest pain, unspecified.    COMPARISON:  February 21, 2024    FINDINGS:  No alveolar consolidation, effusion, or pneumothorax is seen.   The thoracic aorta is normal  cardiac silhouette, central pulmonary vessels and mediastinum are normal in size and are grossly unremarkable.   visualized osseous structures are grossly unremarkable.                                       Medications   amLODIPine tablet 5 mg (5 mg Oral Given 5/14/24 1526)     Medical Decision Making  Differential diagnosis:  ACS, CHF, VTE, aortic dissection,  pneumonia, pneumothorax, COPD, PAD, electrolyte abnormality, dehydration, JONY, viral illness    ED management:  Patient given dose of Norvasc 5 mg.    ED course:  CBC wholly unremarkable.  CMP reveals mild hyperchloremia of 109 and mild hyperglycemia of 107, but is overall unremarkable.  D-dimer within normal limits, low suspicion for VTE and no indication for venous ultrasound or CTA.  BNP within normal limits and patient does not appear fluid overloaded on exam, low suspicion for CHF.  EKG shows a left bundle branch block which is old, but is otherwise unremarkable and shows no ischemic changes.  Troponin within normal limits.  Patient has low risk heart and ARLET scores, low suspicion for ACS as origin of symptoms.  Chest x-ray unremarkable with no evidence acute cardiopulmonary abnormality.  In setting of normal D-dimer, no widened mediastinum on x-ray and symmetrical distal pulses in all 4 extremities, I have very low suspicion for aortic dissection as origin of symptoms.  Leg pain consistent with claudication, but patient has good DP pulses and normal cap refill bilaterally; no evidence of acute ischemic limb.  On reassessment, patient is nontoxic appearing with unremarkable vitals, stable for discharge.  Due to significantly elevated blood pressures both at home and here in ED, I will start patient on 5 mg Norvasc daily.  I have instructed patient to contact her PCP by phone tomorrow to schedule earliest possible follow-up appointment.  Strict ED precautions given for new or worsening symptoms and patient verbalized understanding.  All test results explained and all questions answered.    Amount and/or Complexity of Data Reviewed  Labs: ordered. Decision-making details documented in ED Course.  Radiology: ordered and independent interpretation performed. Decision-making details documented in ED Course.  ECG/medicine tests: ordered and independent interpretation performed. Decision-making details documented in  ED Course.      Additional MDM:   PERC Rule:   Age is greater than or equal to 50 = 1.0  Heart Rate is greater than or equal to 100 = 0.0  SaO2 on room air < 95% = 0.0  Unilateral leg swelling = 0.0  Hemoptysis = 0.0  Recent surgery or trauma = 0.0  Prior PE or DVT =  0.0  Hormone use = 0.00  PERC Score = 1        Well's Criteria Score:  -Clinical symptoms of DVT (leg swelling, pain with palpation) = 0.0  -PE is #1 diagnosis OR equally likely =            0.0  -Heart Rate >100 =   0.0  -Immobilization (= or > than 3 days) or surgery in the previous 4 weeks = 0.0  -Previous DVT/PE = 0.0  -Hemoptysis =          0.0  -Malignancy =           0.0  Well's Probability Score =    0      Heart Score:    History:          Moderately suspicious.  ECG:             Normal  Age:               45-65 years  Risk factors: 1-2 risk factors  Troponin:       Less than or equal to normal limit  Heart Score = 3          ARLET Score:   Age over 65:                                    0.00   > or = to 3 CAD risk factors:           0.00  Established CAD:                            0.00  > or = to 2 anginal events in the past 24 hours: 0.00  Use of ASA in past 7 days:              0.00  Elevated Enzymes:                         0.00  ST Depression > or = to 0.05 mV:  0.00  ARLET score = 0                                     Clinical Impression:  Final diagnoses:  [R07.89] Chest tightness (Primary)  [R20.2] Arm paresthesia, left  [M79.661, M79.662] Bilateral calf pain  [R03.0] Elevated blood pressure reading without diagnosis of hypertension          ED Disposition Condition    Discharge Good          ED Prescriptions       Medication Sig Dispense Start Date End Date Auth. Provider    amLODIPine (NORVASC) 5 MG tablet Take 1 tablet (5 mg total) by mouth once daily. 30 tablet 5/14/2024 6/13/2024 Brad Chua PA    methocarbamoL (ROBAXIN) 750 MG Tab Take 2 tablets (1,500 mg total) by mouth 3 (three) times daily as needed (calf pain). 30 tablet  5/14/2024 5/24/2024 Brad Chua PA          Follow-up Information       Follow up With Specialties Details Why Contact Info    Apple Rodriguez FNP Internal Medicine Call in 1 day  [address] [facility] [address] [facility]  Internal Medicine Clinic  [facility] [address]  [phone]      [facility] - Emergency Dept Emergency Medicine  As needed, If symptoms worsen [address] [id number] [phone]             Brad Chua PA  05/14/24 1548

## 2024-05-15 ENCOUNTER — HOSPITAL ENCOUNTER (OUTPATIENT)
Dept: RADIOLOGY | Facility: HOSPITAL | Age: 54
Discharge: HOME | End: 2024-05-15
Attending: STUDENT IN AN ORGANIZED HEALTH CARE EDUCATION/TRAINING PROGRAM
Payer: MEDICAID

## 2024-05-15 DIAGNOSIS — M79.89 SOFT TISSUE MASS: ICD-10-CM

## 2024-05-15 PROCEDURE — 70543 MRI ORBT/FAC/NCK W/O &W/DYE: CPT | Mod: TC

## 2024-05-15 PROCEDURE — 25500020 PHARM REV CODE 255

## 2024-05-15 PROCEDURE — A9577 INJ MULTIHANCE: HCPCS

## 2024-05-15 PROCEDURE — 76604 US EXAM CHEST: CPT | Mod: TC

## 2024-05-15 RX ADMIN — GADOBENATE DIMEGLUMINE 17 ML: 529 INJECTION, SOLUTION INTRAVENOUS at 07:05

## 2024-05-16 ENCOUNTER — HOSPITAL ENCOUNTER (OUTPATIENT)
Dept: RADIOLOGY | Facility: HOSPITAL | Age: 54
Discharge: HOME | End: 2024-05-16
Payer: MEDICAID

## 2024-05-16 ENCOUNTER — HOSPITAL ENCOUNTER (OUTPATIENT)
Dept: CARDIOLOGY | Facility: HOSPITAL | Age: 54
Discharge: HOME | End: 2024-05-16
Payer: MEDICAID

## 2024-05-16 VITALS
HEIGHT: 61 IN | DIASTOLIC BLOOD PRESSURE: 77 MMHG | HEART RATE: 58 BPM | SYSTOLIC BLOOD PRESSURE: 141 MMHG | WEIGHT: 175.94 LBS | RESPIRATION RATE: 20 BRPM | BODY MASS INDEX: 33.22 KG/M2

## 2024-05-16 DIAGNOSIS — R00.1 BRADYCARDIA: ICD-10-CM

## 2024-05-16 DIAGNOSIS — R06.09 DYSPNEA ON EXERTION: ICD-10-CM

## 2024-05-16 LAB
CV STRESS BASE HR: 49 BPM
DIASTOLIC BLOOD PRESSURE: 77 MMHG
NUC REST EJECTION FRACTION: 70
NUC STRESS EJECTION FRACTION: 76 %
OHS CV CPX 85 PERCENT MAX PREDICTED HEART RATE MALE: 139
OHS CV CPX ESTIMATED METS: 2
OHS CV CPX MAX PREDICTED HEART RATE: 164
OHS CV CPX PATIENT IS FEMALE: 1
OHS CV CPX PATIENT IS MALE: 0
OHS CV CPX PEAK DIASTOLIC BLOOD PRESSURE: 95 MMHG
OHS CV CPX PEAK HEAR RATE: 103 BPM
OHS CV CPX PEAK RATE PRESSURE PRODUCT: NORMAL
OHS CV CPX PEAK SYSTOLIC BLOOD PRESSURE: 167 MMHG
OHS CV CPX PERCENT MAX PREDICTED HEART RATE ACHIEVED: 66
OHS CV CPX RATE PRESSURE PRODUCT PRESENTING: 6909
OHS QRS DURATION: 142 MS
OHS QRS DURATION: 144 MS
OHS QTC CALCULATION: 422 MS
OHS QTC CALCULATION: 447 MS
STRESS ECHO POST EXERCISE DUR MIN: 2 MINUTES
STRESS ECHO POST EXERCISE DUR SEC: 57 SECONDS
SYSTOLIC BLOOD PRESSURE: 141 MMHG

## 2024-05-16 PROCEDURE — A9502 TC99M TETROFOSMIN: HCPCS

## 2024-05-16 PROCEDURE — 93017 CV STRESS TEST TRACING ONLY: CPT

## 2024-05-16 PROCEDURE — 78452 HT MUSCLE IMAGE SPECT MULT: CPT

## 2024-05-16 PROCEDURE — 63600175 PHARM REV CODE 636 W HCPCS

## 2024-05-16 PROCEDURE — 93225 XTRNL ECG REC<48 HRS REC: CPT

## 2024-05-16 RX ORDER — REGADENOSON 0.08 MG/ML
0.4 INJECTION, SOLUTION INTRAVENOUS
Status: COMPLETED | OUTPATIENT
Start: 2024-05-16 | End: 2024-05-16

## 2024-05-16 RX ADMIN — TETROFOSMIN 11 MILLICURIE: 1.38 INJECTION, POWDER, LYOPHILIZED, FOR SOLUTION INTRAVENOUS at 08:05

## 2024-05-16 RX ADMIN — TETROFOSMIN 32.7 MILLICURIE: 1.38 INJECTION, POWDER, LYOPHILIZED, FOR SOLUTION INTRAVENOUS at 09:05

## 2024-05-16 RX ADMIN — REGADENOSON 0.4 MG: 0.08 INJECTION, SOLUTION INTRAVENOUS at 08:05

## 2024-05-21 LAB
OHS CV EVENT MONITOR DAY: 0
OHS CV HOLTER LENGTH DECIMAL HOURS: 48
OHS CV HOLTER LENGTH HOURS: 48
OHS CV HOLTER LENGTH MINUTES: 0

## 2024-05-22 ENCOUNTER — OFFICE VISIT (OUTPATIENT)
Dept: GYNECOLOGY | Facility: CLINIC | Age: 54
End: 2024-05-22
Payer: MEDICAID

## 2024-05-22 VITALS
OXYGEN SATURATION: 100 % | WEIGHT: 179.63 LBS | BODY MASS INDEX: 33.91 KG/M2 | RESPIRATION RATE: 18 BRPM | TEMPERATURE: 99 F | HEART RATE: 73 BPM | DIASTOLIC BLOOD PRESSURE: 72 MMHG | HEIGHT: 61 IN | SYSTOLIC BLOOD PRESSURE: 132 MMHG

## 2024-05-22 DIAGNOSIS — Z12.31 ENCOUNTER FOR SCREENING MAMMOGRAM FOR BREAST CANCER: ICD-10-CM

## 2024-05-22 DIAGNOSIS — N39.46 MIXED STRESS AND URGE URINARY INCONTINENCE: ICD-10-CM

## 2024-05-22 DIAGNOSIS — R23.2 HOT FLASHES: ICD-10-CM

## 2024-05-22 DIAGNOSIS — Z01.419 WELL WOMAN EXAM WITH ROUTINE GYNECOLOGICAL EXAM: Primary | ICD-10-CM

## 2024-05-22 PROCEDURE — 3075F SYST BP GE 130 - 139MM HG: CPT | Mod: CPTII,,,

## 2024-05-22 PROCEDURE — 1159F MED LIST DOCD IN RCRD: CPT | Mod: CPTII,,,

## 2024-05-22 PROCEDURE — 99386 PREV VISIT NEW AGE 40-64: CPT | Mod: S$PBB,,,

## 2024-05-22 PROCEDURE — 3062F POS MACROALBUMINURIA REV: CPT | Mod: CPTII,,,

## 2024-05-22 PROCEDURE — 99215 OFFICE O/P EST HI 40 MIN: CPT | Mod: PBBFAC

## 2024-05-22 PROCEDURE — 3008F BODY MASS INDEX DOCD: CPT | Mod: CPTII,,,

## 2024-05-22 PROCEDURE — 3078F DIAST BP <80 MM HG: CPT | Mod: CPTII,,,

## 2024-05-22 PROCEDURE — 3044F HG A1C LEVEL LT 7.0%: CPT | Mod: CPTII,,,

## 2024-05-22 PROCEDURE — 3066F NEPHROPATHY DOC TX: CPT | Mod: CPTII,,,

## 2024-05-22 NOTE — PROGRESS NOTES
[facility] -  Gynecology / Women's Health Clinic     Subjective:      Patient ID: Glenda Huval Dance is a 54 y.o. female.    Chief Complaint:  Gynecologic Exam      History of Present Illness:  The patient  here for annual exam. Pt had hysterectomy for AUB 15+yrs ago. Denies history of abnormal paps. MG- 3/12/24 & BIRADS 1. Denies breast or urinary complaints. Denies pelvic pain, abnormal bleeding or discharge. Pt reports no STIs in the past and no concerns. Sexually active with . Pt c/o hot flashes, manageable. Denies tobacco use, pt admitted quit 4-6 months ago. Dep. screening 0. Denies fly hx of breast, ovarian, uterine or colon cancer. Cologuard  neg.    GYN & OB History:  No LMP recorded. Patient has had a hysterectomy.     OB History    Para Term  AB Living   3 3 3 0       SAB IAB Ectopic Multiple Live Births         1        # Outcome Date GA Lbr Maxx/2nd Weight Sex Type Anes PTL Lv   3A Term      CS-LTranv      3B Term      CS-LTranv      2 Term      Vag-Spont      1 Term      Vag-Spont          Past Medical History:   Diagnosis Date    COPD (chronic obstructive pulmonary disease)     Hyperlipidemia     Sensorineural hearing loss (SNHL) of both ears         Past Surgical History:   Procedure Laterality Date     SECTION  2001    EAR RECONSTRUCTION Right 2012    HYSTERECTOMY          Social History     Tobacco Use    Smoking status: Former     Current packs/day: 0.00     Average packs/day: 1 pack/day for 43.0 years (43.0 ttl pk-yrs)     Types: Cigarettes     Quit date: 1/15/2024     Years since quittin.3    Smokeless tobacco: Never   Substance and Sexual Activity    Alcohol use: Not Currently     Comment: occasionally    Drug use: Yes     Frequency: 1.0 times per week     Types: Marijuana     Comment: Sometimes at night  to sleep    Sexual activity: Yes     Partners: Male     Birth control/protection: None        Current Outpatient Medications  "  Medication Instructions    albuterol (VENTOLIN HFA) 90 mcg/actuation inhaler 2 puffs, Inhalation, Every 4 hours PRN, Rescue    albuterol-ipratropium (DUO-NEB) 2.5 mg-0.5 mg/3 mL nebulizer solution 3 mLs, Nebulization, Every 4 hours, Rescue    amLODIPine (NORVASC) 5 mg, Oral, Daily    budesonide-formoterol 160-4.5 mcg (SYMBICORT) 160-4.5 mcg/actuation HFAA 2 puffs, Inhalation, Every 12 hours, Controller    cetirizine (ZYRTEC) 10 mg, Oral, Nightly    fluticasone propionate (FLONASE) 50 mcg/actuation nasal spray 2 sprays, Each Nostril, 2 times daily    ibuprofen (ADVIL,MOTRIN) 600 mg, Oral, Every 6 hours PRN    methocarbamoL (ROBAXIN) 1,500 mg, Oral, 3 times daily PRN    neomycin-polymyxin-hydrocortisone (CORTISPORIN) 3.5-10,000-1 mg/mL-unit/mL-% otic suspension 3 drops, Right Ear, 3 times daily    rosuvastatin (CRESTOR) 20 mg, Oral, Daily    sumatriptan (IMITREX) 25 mg, Oral, Daily PRN, May repeat x 1 dose in 2 hours if ineffective.    topiramate (TOPAMAX) 25 MG tablet TAKE 1 TABLET BY MOUTH AT BEDTIME FOR 7 DAYS THEN  INCREASE  TO  TWICE  A  DAY       Review of patient's allergies indicates:   Allergen Reactions    Penicillins Anaphylaxis and Swelling         Review of Systems:  Review of Systems  Negative except for pertinent findings for positives per HPI.     Objective:   Physical Exam   Visit Vitals  /72   Pulse 73   Temp 98.7 °F (37.1 °C) (Oral)   Resp 18   Ht 5' 1" (1.549 m)   Wt 81.5 kg (179 lb 9.6 oz)   SpO2 100%   BMI 33.94 kg/m²       GENERAL: Well-developed female in no acute distress.  SKIN: Normal to inspection,warm, dry and intact.  BREASTS: No rashes or erythema. No masses, lumps, discharge, tenderness.  VULVA: General appearance WNL; external genitalia with no lesions or erythema.  BIMANUAL EXAM: Vaginal mucosa/vault atrophic, Vaginal cuff intact. Uterus/Cervix surgically absent. Bilateral adnexa reveal no tenderness.  PSYCHIATRIC: Patient is oriented to person, place, and time. Mood and " affect are normal.    Assessment:       ICD-10-CM ICD-9-CM   1. Well woman exam with routine gynecological exam  Z01.419 V72.31   2. Encounter for screening mammogram for breast cancer  Z12.31 V76.12   3. Mixed stress and urge urinary incontinence  N39.46 788.33   4. Hot flashes  R23.2 782.62       Plan:     1. Well woman exam with routine gynecological exam  -     Ambulatory referral/consult to Gynecology    2. Encounter for screening mammogram for breast cancer  -     Mammo Digital Screening Bilat w/ [facility]; Future; Expected date: 03/14/2025    3. Mixed stress and urge urinary incontinence  -     Ambulatory referral/consult to Urology; Future; Expected date: 05/29/2024    4. Hot flashes    Pelvic exam today, pap deferred d/t hysterectomy of benign causes  MG ordered  Encouraged well balanced diet and exercise 3-4x per week; use of handheld and fan at bedside, keep home cooled. Ice packs. Encouraged wearing layers to remove as needed, light colored clothes and linen fabrics. Avoid spicy foods and excessive caffeine use. Maintain healthy body weight. Quit smoking.  Exercise, yoga, meditation, paced respirations are all good coping techniques.   Discussed medication options including OTC regimens (Estroven, Amberen, black cohosh), HRT, SSRI/SNRI. Decided to use OTC options    Mixed stress/urge urinary incontinence - refer to Urology  Follow up in about 1 year (around 5/22/2025) for Annual.

## 2024-05-27 ENCOUNTER — HOSPITAL ENCOUNTER (OUTPATIENT)
Dept: CARDIOLOGY | Facility: HOSPITAL | Age: 54
Discharge: HOME | End: 2024-05-27
Payer: MEDICAID

## 2024-05-27 DIAGNOSIS — R06.09 DYSPNEA ON EXERTION: ICD-10-CM

## 2024-05-27 DIAGNOSIS — R06.01 ORTHOPNEA: ICD-10-CM

## 2024-05-27 LAB
AV INDEX (PROSTH): 0.55
AV MEAN GRADIENT: 14 MMHG
AV PEAK GRADIENT: 26 MMHG
AV VALVE AREA BY VELOCITY RATIO: 1.75 CM²
AV VALVE AREA: 1.72 CM²
AV VELOCITY RATIO: 0.56
CV ECHO LV RWT: 0.41 CM
DOP CALC AO PEAK VEL: 2.55 M/S
DOP CALC AO VTI: 49.2 CM
DOP CALC LVOT AREA: 3.1 CM2
DOP CALC LVOT DIAMETER: 2 CM
DOP CALC LVOT PEAK VEL: 1.42 M/S
DOP CALC LVOT STROKE VOLUME: 84.47 CM3
DOP CALC MV VTI: 33.4 CM
DOP CALCLVOT PEAK VEL VTI: 26.9 CM
E WAVE DECELERATION TIME: 230 MSEC
E/A RATIO: 0.77
E/E' RATIO: 9 M/S
ECHO LV POSTERIOR WALL: 1.01 CM (ref 0.6–1.1)
FRACTIONAL SHORTENING: 24 % (ref 28–44)
INTERVENTRICULAR SEPTUM: 1.01 CM (ref 0.6–1.1)
LEFT ATRIUM SIZE: 4.7 CM
LEFT ATRIUM VOLUME MOD: 34.8 CM3
LEFT INTERNAL DIMENSION IN SYSTOLE: 3.74 CM (ref 2.1–4)
LEFT VENTRICLE DIASTOLIC VOLUME: 114 ML
LEFT VENTRICLE SYSTOLIC VOLUME: 59.6 ML
LEFT VENTRICULAR INTERNAL DIMENSION IN DIASTOLE: 4.93 CM (ref 3.5–6)
LEFT VENTRICULAR MASS: 180.21 G
LV LATERAL E/E' RATIO: 9 M/S
LV SEPTAL E/E' RATIO: 9 M/S
LVOT MG: 4 MMHG
LVOT MV: 0.93 CM/S
MV MEAN GRADIENT: 3 MMHG
MV PEAK A VEL: 0.93 M/S
MV PEAK E VEL: 0.72 M/S
MV PEAK GRADIENT: 5 MMHG
MV STENOSIS PRESSURE HALF TIME: 57 MS
MV VALVE AREA BY CONTINUITY EQUATION: 2.53 CM2
MV VALVE AREA P 1/2 METHOD: 3.86 CM2
OHS LV EJECTION FRACTION SIMPSONS BIPLANE MOD: 58 %
PISA TR MAX VEL: 2.7 M/S
PV PEAK GRADIENT: 12 MMHG
PV PEAK VELOCITY: 1.75 M/S
TDI LATERAL: 0.08 M/S
TDI SEPTAL: 0.08 M/S
TDI: 0.08 M/S
TR MAX PG: 29 MMHG
TRICUSPID ANNULAR PLANE SYSTOLIC EXCURSION: 2.22 CM

## 2024-05-27 PROCEDURE — 93306 TTE W/DOPPLER COMPLETE: CPT

## 2024-05-27 PROCEDURE — 93306 TTE W/DOPPLER COMPLETE: CPT | Mod: 26,,, | Performed by: STUDENT IN AN ORGANIZED HEALTH CARE EDUCATION/TRAINING PROGRAM

## 2024-05-28 ENCOUNTER — OFFICE VISIT (OUTPATIENT)
Dept: SURGERY | Facility: CLINIC | Age: 54
End: 2024-05-28
Payer: MEDICAID

## 2024-05-28 ENCOUNTER — ANESTHESIA EVENT (OUTPATIENT)
Dept: SURGERY | Facility: HOSPITAL | Age: 54
End: 2024-05-28
Payer: MEDICAID

## 2024-05-28 VITALS
TEMPERATURE: 98 F | HEART RATE: 56 BPM | OXYGEN SATURATION: 96 % | BODY MASS INDEX: 33.76 KG/M2 | HEIGHT: 61 IN | SYSTOLIC BLOOD PRESSURE: 135 MMHG | DIASTOLIC BLOOD PRESSURE: 83 MMHG | WEIGHT: 178.81 LBS

## 2024-05-28 DIAGNOSIS — D17.0 LIPOMA OF NECK: Primary | ICD-10-CM

## 2024-05-28 PROCEDURE — 99215 OFFICE O/P EST HI 40 MIN: CPT | Mod: PBBFAC

## 2024-05-28 RX ORDER — HEPARIN SODIUM 5000 [USP'U]/ML
5000 INJECTION, SOLUTION INTRAVENOUS; SUBCUTANEOUS ONCE
Status: CANCELLED | OUTPATIENT
Start: 2024-05-28 | End: 2024-05-28

## 2024-05-28 RX ORDER — IPRATROPIUM BROMIDE AND ALBUTEROL SULFATE 2.5; .5 MG/3ML; MG/3ML
3 SOLUTION RESPIRATORY (INHALATION) EVERY 6 HOURS PRN
Status: CANCELLED | OUTPATIENT
Start: 2024-05-28

## 2024-05-28 RX ORDER — SODIUM CHLORIDE 9 MG/ML
INJECTION, SOLUTION INTRAVENOUS CONTINUOUS
Status: CANCELLED | OUTPATIENT
Start: 2024-05-28

## 2024-05-28 NOTE — ANESTHESIA PREPROCEDURE EVALUATION
Glenda Huval Dance is a 54 y.o. female PRESENTING FOR EXCISION, SOFT TISSUE with a history of   -LIPOMA NECK    Vitals:    06/10/24 0513 06/10/24 0528 06/10/24 0535 06/10/24 0619   BP:   (!) 99/51 135/86   Pulse:  (!) 42  (!) 47   Resp:  16  20   Temp:    36 °C (96.8 °F)   TempSrc:    Temporal   SpO2:    99%   Weight: 80.8 kg (178 lb 3.2 oz)          -COPD  -HLD  -HTN  -SNHL  -FORMER SMOKER  -MARIJUANA  -ER VISIT 24 WITH SOB/ELEVATED BP. STARTED ON AMLODIPINE. OTHERWISE, WORK-UP UNREMARKABLE   -GERD  -Hx ENCEPHALOCELE w/REPAIR in [place] 10+yrs. 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    Cough    Chronic obstructive pulmonary disease    Class 1 obesity due to excess calories with serious comorbidity and body mass index (BMI) of 33.0 to 33.9 in adult    Tobacco abuse    Mixed hyperlipidemia    Proteinuria    IFG (impaired fasting glucose)       Past Surgical History:   Procedure Laterality Date     SECTION  2001    EAR RECONSTRUCTION Right 2012    HYSTERECTOMY         Lab Results   Component Value Date    WBC 10.31 2024    HGB 14.4 2024    HCT 40.5 2024     2024       CMP  Sodium   Date Value Ref Range Status   2024 142 136 - 145 mmol/L Final     Potassium   Date Value Ref Range Status   2024 4.0 3.5 - 5.1 mmol/L Final     CO2   Date Value Ref Range Status   2024 25 22 - 29 mmol/L Final     Blood Urea Nitrogen   Date Value Ref Range Status   2024 14.8 9.8 - 20.1 mg/dL Final     Creatinine   Date Value Ref Range Status   2024 0.86 0.55 - 1.02 mg/dL Final     Calcium   Date Value Ref Range Status   2024 10.1 8.4 - 10.2 mg/dL Final      Albumin   Date Value Ref Range Status   05/14/2024 4.2 3.5 - 5.0 g/dL Final     Bilirubin Total   Date Value Ref Range Status   05/14/2024 0.8 <=1.5 mg/dL Final     ALP   Date Value Ref Range Status   05/14/2024 70 40 - 150 unit/L Final     AST   Date Value Ref Range Status   05/14/2024 22 5 - 34 unit/L Final     ALT   Date Value Ref Range Status   05/14/2024 33 0 - 55 unit/L Final     eGFR   Date Value Ref Range Status   05/14/2024 >60 mL/min/1.73/m2 Final         ECHO 5/10/24     Left Ventricle: The left ventricle is normal in size. Normal wall thickness. There is normal systolic function with a visually estimated ejection fraction of 55 - 60%.    Right Ventricle: Normal right ventricular cavity size. Systolic function is normal.    Left Atrium: Left atrium is dilated.    Aortic Valve: The aortic valve is a trileaflet valve.    Mitral Valve: There is no stenosis. The mean pressure gradient across the mitral valve is 3 mmHg at a heart rate of  bpm.    Tricuspid Valve: There is mild regurgitation.     HOLTER MONITOR 5/16/24  -Sinus rhythm, average HR 37 bpm, minimum 58 bpm and maximum 90 bpm.  - 23% of beats in bradycardia < 50 bpm.  - Profound bradycardia appears nocturnal.  - No profound pauses.  - Rare isolated PACs. No SVT episodes.  - Rare isolated PVCs. No VT episodes.  - No atrial fibrillation/atrial flutter.  - No reported symptoms.    STRESS TEST 5/16/24      CARDS PROGRESS NOTE 5/10/24      PULM PROGRESS NOTE 5/22/24    Past anesthesia records: NONE      Pre-op Assessment    I have reviewed the Patient Summary Reports.     I have reviewed the Nursing Notes. I have reviewed the NPO Status.   I have reviewed the Medications.     Review of Systems  Anesthesia Hx:  No problems with previous Anesthesia   History of prior surgery of interest to airway management or planning:          Denies Family Hx of Anesthesia complications.    Denies Personal Hx of Anesthesia complications.                     Hematology/Oncology:  Hematology Normal   Oncology Normal                                   EENT/Dental:  EENT/Dental Normal           Cardiovascular:  Cardiovascular Normal                                            Pulmonary:  Pulmonary Normal                       Renal/:  Renal/ Normal                 Hepatic/GI:  Hepatic/GI Normal                 Musculoskeletal:  Musculoskeletal Normal                Neurological:  Neurology Normal                                      Endocrine:  Endocrine Normal            Dermatological:  Skin Normal    Psych:  Psychiatric Normal                    Physical Exam  General: Well nourished, Cooperative, Alert and Oriented    Airway:  Mallampati: IV   Mouth Opening: < 3 cm  TM Distance: 4 - 6 cm  Tongue: Normal  Neck ROM: Normal ROM    Dental:  Edentulous        Anesthesia Plan  Type of Anesthesia, risks & benefits discussed:    Anesthesia Type: Gen ETT  Intra-op Monitoring Plan: Standard ASA Monitors  Post Op Pain Control Plan: multimodal analgesia and IV/PO Opioids PRN  Induction:  IV  Airway Plan: Direct  Informed Consent: Informed consent signed with the Patient and all parties understand the risks and agree with anesthesia plan.  All questions answered. Patient consented to blood products? No  ASA Score: 4  Day of Surgery Review of History & Physical: H&P Update referred to the surgeon/provider.    Ready For Surgery From Anesthesia Perspective.     .

## 2024-05-28 NOTE — PROGRESS NOTES
General Surgery  Clinic Note    Reason for Visit:   Mass at posterior upper back    History of Present Illness:  53-year-old female with COPD, HTN, HLD who we follow for large posterior neck consistent with lipoma on imaging.  She desires excision but at a previous appointment could not perform 4 Mets and therefore was sent to cardiology and pulmonology for clearance. Per pulmonology, she is at a slightly increased risk but is now in optimal condition from a pulmonary standpoint. She will see cardiology in 3 days for the official read and clearance but nuclear stress test and echocardiogram are within normal limits. Denies taking blood thinners. Has been smoke-free for ~5 months.    Allergies:  Review of patient's allergies indicates:   Allergen Reactions    Penicillins Anaphylaxis and Swelling       Home Medications:  Current Outpatient Medications on File Prior to Visit   Medication Sig    albuterol (VENTOLIN HFA) 90 mcg/actuation inhaler Inhale 2 puffs into the lungs every 4 (four) hours as needed for Wheezing or Shortness of Breath. Rescue    albuterol-ipratropium (DUO-NEB) 2.5 mg-0.5 mg/3 mL nebulizer solution Take 3 mLs by nebulization every 4 (four) hours. Rescue    amLODIPine (NORVASC) 5 MG tablet Take 1 tablet (5 mg total) by mouth once daily.    cetirizine (ZYRTEC) 10 MG tablet Take 1 tablet (10 mg total) by mouth every evening.    fluticasone propionate (FLONASE) 50 mcg/actuation nasal spray 2 sprays by Each Nostril route 2 (two) times daily.    rosuvastatin (CRESTOR) 20 MG tablet Take 1 tablet (20 mg total) by mouth once daily.    sumatriptan (IMITREX) 50 MG tablet Take 0.5 tablets (25 mg total) by mouth daily as needed for Migraine. May repeat x 1 dose in 2 hours if ineffective.    topiramate (TOPAMAX) 25 MG tablet TAKE 1 TABLET BY MOUTH AT BEDTIME FOR 7 DAYS THEN  INCREASE  TO  TWICE  A  DAY    budesonide-formoterol 160-4.5 mcg (SYMBICORT) 160-4.5 mcg/actuation HFAA Inhale 2 puffs into the lungs every  12 (twelve) hours. Controller (Patient not taking: Reported on 2024)    ibuprofen (ADVIL,MOTRIN) 600 MG tablet Take 1 tablet (600 mg total) by mouth every 6 (six) hours as needed for Pain. (Patient not taking: Reported on 2024)    neomycin-polymyxin-hydrocortisone (CORTISPORIN) 3.5-10,000-1 mg/mL-unit/mL-% otic suspension Place 3 drops into the right ear 3 (three) times daily. (Patient not taking: Reported on 2024)     No current facility-administered medications on file prior to visit.       Past Medical History:   Diagnosis Date    COPD (chronic obstructive pulmonary disease)     Hyperlipidemia     Sensorineural hearing loss (SNHL) of both ears      Past Surgical History:   Procedure Laterality Date     SECTION      EAR RECONSTRUCTION Right 2012    HYSTERECTOMY       Family History   Problem Relation Name Age of Onset    Diabetes Mother      Cancer Mother      Heart disease Father      Cancer Sister Michelle     Cancer Sister       Social History     Tobacco Use    Smoking status: Former     Current packs/day: 0.00     Average packs/day: 1 pack/day for 43.0 years (43.0 ttl pk-yrs)     Types: Cigarettes     Quit date: 1/15/2024     Years since quittin.3    Smokeless tobacco: Never   Substance Use Topics    Alcohol use: Not Currently     Comment: occasionally    Drug use: Yes     Frequency: 1.0 times per week     Types: Marijuana     Comment: Sometimes at night  to sleep        Review of Systems:  Constitutional: no fever or chills  Respiratory: positive for shortness of breath  Cardiovascular: no chest pain or palpitations  Gastrointestinal: no nausea or vomiting, no abdominal pain or change in bowel habits  Genitourinary: no hematuria or dysuria    OBJECTIVE:     Vital Signs:  Temp: 98.2 °F (36.8 °C) (24)  Pulse: (!) 56 (24)  BP: 135/83 (24)  SpO2: 96 % (24)    Physical Exam:  NAD  Normal respiratory effort on room air  RRR  Abdomen soft,  nontender, nondistended  Masses about 10 cm movable  Moving all extremities    Imaging:  MRI ST Neck - 5/15/24  Impression:     The subcutaneous lipomatous mass previously seen to the dorsal aspect of the neck has increased in size since the prior study and currently measures 11 cm x 6 cm x 9 cm in size.  It demonstrates no MR features to suggest low-grade liposarcoma.  The mass extends to the underlying fascia but does not clearly invade the underlying trapezius muscle.     Cardiology:  Echocardiogram - 5/27    Left Ventricle: The left ventricle is normal in size. Normal wall thickness. There is normal systolic function with a visually estimated ejection fraction of 55 - 60%.    Right Ventricle: Normal right ventricular cavity size. Systolic function is normal.    Left Atrium: Left atrium is dilated.    Aortic Valve: The aortic valve is a trileaflet valve.    Mitral Valve: There is no stenosis. The mean pressure gradient across the mitral valve is 3 mmHg at a heart rate of  bpm.    Tricuspid Valve: There is mild regurgitation    Holter Monitor - 5/16  - No a-fib/flutter  - Bradys <50 bpm nocturnal  - Rare isolated PVCs, PACs    Nuclear Stress test - 5/16    Normal myocardial perfusion scan. There is no evidence of myocardial ischemia or infarction.    The gated perfusion images showed an ejection fraction of 70% at rest. The gated perfusion images showed an ejection fraction of 76% post stress.    The patient reported no chest pain during the stress test.    There were no arrhythmias during stress.  The patient has a low risk nuclear stress test      Nuclear stress test indicates no ischemia.    ASSESSMENT:     54-year-old female presents with enlarging upper back mass    PLAN:     - Plan for excision in OR on 6/10/24 pending cards appt 5/31  - Consents obtained, R/B/As and all questions answered  - NPO at midnight prior surgery  - RTC 2 weeks post op    Patient seen and examined with Student Doctor Rothman. Agree  with assessment and plan as documented. Above note reviewed and edited as necessary.    Walter Walters MD  [employer] General Surgery, PGY-3

## 2024-05-28 NOTE — PROGRESS NOTES
Pt in Surgery clinic today for f/u.  Evaluated per Dr. EMILI Walters.  Surgery scheduled for 6/10/2024.  Consents signed and witnessed.  Surgery instructions given verbally and written.  Pt voicing understanding.  Post-op appt given for 2 weeks following surgery.

## 2024-05-28 NOTE — PROGRESS NOTES
General Surgery  Clinic Note    Reason for Visit:   Mass at posterior upper back    History of Present Illness:  53-year-old female with COPD, HTN, HLD presented for evaluation following cardiology and pulmonology clearance. Per pulmonology, she is at a slightly increased risk but is now in optimal condition from a pulmonary standpoint. She will see cardiology in 3 days for the official read and clearance but nuclear stress test and echocardiogram are within normal limits. Denies taking blood thinners. Has been smoke-free for ~5 months.    Allergies:  Review of patient's allergies indicates:   Allergen Reactions    Penicillins Anaphylaxis and Swelling       Home Medications:  Current Outpatient Medications on File Prior to Visit   Medication Sig    albuterol (VENTOLIN HFA) 90 mcg/actuation inhaler Inhale 2 puffs into the lungs every 4 (four) hours as needed for Wheezing or Shortness of Breath. Rescue    albuterol-ipratropium (DUO-NEB) 2.5 mg-0.5 mg/3 mL nebulizer solution Take 3 mLs by nebulization every 4 (four) hours. Rescue    amLODIPine (NORVASC) 5 MG tablet Take 1 tablet (5 mg total) by mouth once daily.    budesonide-formoterol 160-4.5 mcg (SYMBICORT) 160-4.5 mcg/actuation HFAA Inhale 2 puffs into the lungs every 12 (twelve) hours. Controller    cetirizine (ZYRTEC) 10 MG tablet Take 1 tablet (10 mg total) by mouth every evening. (Patient not taking: Reported on 5/10/2024)    fluticasone propionate (FLONASE) 50 mcg/actuation nasal spray 2 sprays by Each Nostril route 2 (two) times daily.    ibuprofen (ADVIL,MOTRIN) 600 MG tablet Take 1 tablet (600 mg total) by mouth every 6 (six) hours as needed for Pain.    neomycin-polymyxin-hydrocortisone (CORTISPORIN) 3.5-10,000-1 mg/mL-unit/mL-% otic suspension Place 3 drops into the right ear 3 (three) times daily. (Patient not taking: Reported on 5/7/2024)    rosuvastatin (CRESTOR) 20 MG tablet Take 1 tablet (20 mg total) by mouth once daily.    sumatriptan (IMITREX) 50  MG tablet Take 0.5 tablets (25 mg total) by mouth daily as needed for Migraine. May repeat x 1 dose in 2 hours if ineffective.    topiramate (TOPAMAX) 25 MG tablet TAKE 1 TABLET BY MOUTH AT BEDTIME FOR 7 DAYS THEN  INCREASE  TO  TWICE  A  DAY     No current facility-administered medications on file prior to visit.       Past Medical History:   Diagnosis Date    COPD (chronic obstructive pulmonary disease)     Hyperlipidemia     Sensorineural hearing loss (SNHL) of both ears      Past Surgical History:   Procedure Laterality Date     SECTION      EAR RECONSTRUCTION Right 2012    HYSTERECTOMY       Family History   Problem Relation Name Age of Onset    Cancer Sister Michelle      Social History     Tobacco Use    Smoking status: Former     Current packs/day: 0.00     Average packs/day: 1 pack/day for 43.0 years (43.0 ttl pk-yrs)     Types: Cigarettes     Quit date: 1/15/2024     Years since quittin.3    Smokeless tobacco: Never   Substance Use Topics    Alcohol use: Not Currently     Comment: occasionally    Drug use: Yes     Frequency: 1.0 times per week     Types: Marijuana     Comment: Sometimes at night  to sleep        Review of Systems:  Constitutional: no fever or chills  Respiratory: positive for shortness of breath  Cardiovascular: no chest pain or palpitations  Gastrointestinal: no nausea or vomiting, no abdominal pain or change in bowel habits  Genitourinary: no hematuria or dysuria    OBJECTIVE:     Vital Signs:       Physical Exam:  NAD  Normal respiratory effort on room air  RRR  Abdomen soft, nontender, nondistended  Masses about 10 cm movable  Moving all extremities    Imaging:  MRI ST Neck - 5/15/24  Impression:     The subcutaneous lipomatous mass previously seen to the dorsal aspect of the neck has increased in size since the prior study and currently measures 11 cm x 6 cm x 9 cm in size.  It demonstrates no MR features to suggest low-grade liposarcoma.  The mass extends to the  underlying fascia but does not clearly invade the underlying trapezius muscle.     Cardiology:  Echocardiogram - 5/27    Left Ventricle: The left ventricle is normal in size. Normal wall thickness. There is normal systolic function with a visually estimated ejection fraction of 55 - 60%.    Right Ventricle: Normal right ventricular cavity size. Systolic function is normal.    Left Atrium: Left atrium is dilated.    Aortic Valve: The aortic valve is a trileaflet valve.    Mitral Valve: There is no stenosis. The mean pressure gradient across the mitral valve is 3 mmHg at a heart rate of  bpm.    Tricuspid Valve: There is mild regurgitation    Holter Monitor - 5/16  - No a-fib/flutter  - Bradys <50 bpm nocturnal  - Rare isolated PVCs, PACs    Nuclear Stress test - 5/16    Normal myocardial perfusion scan. There is no evidence of myocardial ischemia or infarction.    The gated perfusion images showed an ejection fraction of 70% at rest. The gated perfusion images showed an ejection fraction of 76% post stress.    The patient reported no chest pain during the stress test.    There were no arrhythmias during stress.  The patient has a low risk nuclear stress test      Nuclear stress test indicates no ischemia.    ASSESSMENT:     54-year-old female presents with enlarging upper back mass    PLAN:     Cardiac preop tests are normal. Pulmonology has cleared her for surgery. Official cardiology clearance expected 5/31    - Plan for excision on 6/10/24  - Consents obtained, R/B/As and all questions answered  - NPO at midnight prior surgery  - RTC 2 weeks post op

## 2024-05-31 ENCOUNTER — OFFICE VISIT (OUTPATIENT)
Dept: CARDIOLOGY | Facility: CLINIC | Age: 54
End: 2024-05-31
Payer: MEDICAID

## 2024-05-31 VITALS
HEIGHT: 62 IN | TEMPERATURE: 98 F | RESPIRATION RATE: 18 BRPM | DIASTOLIC BLOOD PRESSURE: 68 MMHG | WEIGHT: 176 LBS | SYSTOLIC BLOOD PRESSURE: 125 MMHG | BODY MASS INDEX: 32.39 KG/M2 | OXYGEN SATURATION: 100 % | HEART RATE: 53 BPM

## 2024-05-31 DIAGNOSIS — D17.0 LIPOMA OF NECK: ICD-10-CM

## 2024-05-31 DIAGNOSIS — E78.2 MIXED HYPERLIPIDEMIA: Primary | Chronic | ICD-10-CM

## 2024-05-31 DIAGNOSIS — Z72.0 TOBACCO ABUSE: Chronic | ICD-10-CM

## 2024-05-31 DIAGNOSIS — I73.9 CLAUDICATION: ICD-10-CM

## 2024-05-31 PROCEDURE — 99214 OFFICE O/P EST MOD 30 MIN: CPT | Mod: PBBFAC | Performed by: INTERNAL MEDICINE

## 2024-05-31 RX ORDER — AMLODIPINE BESYLATE 5 MG/1
5 TABLET ORAL DAILY
Qty: 90 TABLET | Refills: 3 | Status: SHIPPED | OUTPATIENT
Start: 2024-05-31 | End: 2025-05-31

## 2024-05-31 RX ORDER — METHOCARBAMOL 750 MG/1
TABLET, FILM COATED ORAL
COMMUNITY

## 2024-05-31 RX ORDER — TIOTROPIUM BROMIDE 18 UG/1
CAPSULE ORAL; RESPIRATORY (INHALATION)
COMMUNITY
Start: 2024-05-22

## 2024-05-31 NOTE — PROGRESS NOTES
Cardiology Attending    I have discussed Glenda Huval Dance including the patient's symptoms, findings, and management plan with the cardiology fellow.  Please see the Cardiology Note for details.

## 2024-05-31 NOTE — PROGRESS NOTES
Cardiology clinic    CHIEF COMPLAINT:   Chief Complaint   Patient presents with    4 mo w/ results      C/o sob                    Review of patient's allergies indicates:   Allergen Reactions    Penicillins Anaphylaxis and Swelling                              HPI:  Glenda Huval Dance 54 y.o. female  PMH mixed conductive and SNHL bilaterally, lipoma of neck, COPD presents to Cardiology Clinic for cardiovascular risk stratification for removal mass on her upper back.    She has undergone a nuclear stress test and TTE showings no ischemia and mild MR, TR.  She complaints today of claudication.  Has cramping calf pain with ambulating around her house.  This inhibits her from getting out of the house.  No rest pain.  Feet are warm.  She quit smoking 5 months ago.                                                                                                                                                                                                                                                                                                                                                                                                                                                                                      CARDIAC TESTING:  Results for orders placed in visit on 09/27/12    Echo    Narrative  See Cardiologist Report    Transcribed By: ASIF          Date/Time: 10/12/2012 08:46    Electronically Signed By: Destin Dior  Date/Time Signed: 10/12/2012 13:03    No results found for this or any previous visit.     No results found for this or any previous visit.       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    Cough    Chronic obstructive pulmonary disease    Class 1 obesity due to excess calories with serious comorbidity and body mass index (BMI) of 33.0 to 33.9 in adult     Tobacco abuse    Mixed hyperlipidemia    Proteinuria    IFG (impaired fasting glucose)     Past Surgical History:   Procedure Laterality Date     SECTION  2001    EAR RECONSTRUCTION Right 2012    HYSTERECTOMY       Social History     Socioeconomic History    Marital status:    Tobacco Use    Smoking status: Former     Current packs/day: 0.00     Average packs/day: 1 pack/day for 43.0 years (43.0 ttl pk-yrs)     Types: Cigarettes     Quit date: 1/15/2024     Years since quittin.3    Smokeless tobacco: Never   Substance and Sexual Activity    Alcohol use: Not Currently     Comment: occasionally    Drug use: Not Currently     Frequency: 1.0 times per week     Types: Marijuana     Comment: Sometimes at night  to sleep    Sexual activity: Yes     Partners: Male     Birth control/protection: None     Social Determinants of Health     Financial Resource Strain: Medium Risk (2024)    Overall Financial Resource Strain (CARDIA)     Difficulty of Paying Living Expenses: Somewhat hard   Food Insecurity: Food Insecurity Present (2024)    Hunger Vital Sign     Worried About Running Out of Food in the Last Year: Sometimes true     Ran Out of Food in the Last Year: Sometimes true   Transportation Needs: No Transportation Needs (2024)    PRAPARE - Transportation     Lack of Transportation (Medical): No     Lack of Transportation (Non-Medical): No   Physical Activity: Inactive (2024)    Exercise Vital Sign     Days of Exercise per Week: 0 days     Minutes of Exercise per Session: 0 min   Stress: No Stress Concern Present (2024)    [employer] - Occupational Stress Questionnaire     Feeling of Stress : Not at all   Recent Concern: Stress - Stress Concern Present (2024)    [employer] - Occupational Stress Questionnaire     Feeling of Stress : To some extent   Housing Stability: High Risk (2024)    Housing Stability Vital Sign      Unable to Pay for Housing in the Last Year: Yes     Number of Places Lived in the Last Year: 1     Unstable Housing in the Last Year: No        Family History   Problem Relation Name Age of Onset    Diabetes Mother      Cancer Mother      Heart disease Father      Cancer Sister Michelle     Cancer Sister           Current Outpatient Medications:     albuterol (VENTOLIN HFA) 90 mcg/actuation inhaler, Inhale 2 puffs into the lungs every 4 (four) hours as needed for Wheezing or Shortness of Breath. Rescue, Disp: 18 g, Rfl: 2    albuterol-ipratropium (DUO-NEB) 2.5 mg-0.5 mg/3 mL nebulizer solution, Take 3 mLs by nebulization every 4 (four) hours. Rescue, Disp: 75 mL, Rfl: 0    amLODIPine (NORVASC) 5 MG tablet, Take 1 tablet (5 mg total) by mouth once daily., Disp: 30 tablet, Rfl: 0    budesonide-formoterol 160-4.5 mcg (SYMBICORT) 160-4.5 mcg/actuation HFAA, Inhale 2 puffs into the lungs every 12 (twelve) hours. Controller, Disp: 10.2 g, Rfl: 3    cetirizine (ZYRTEC) 10 MG tablet, Take 1 tablet (10 mg total) by mouth every evening., Disp: 90 tablet, Rfl: 3    fluticasone propionate (FLONASE) 50 mcg/actuation nasal spray, 2 sprays by Each Nostril route 2 (two) times daily., Disp: , Rfl:     ibuprofen (ADVIL,MOTRIN) 600 MG tablet, Take 1 tablet (600 mg total) by mouth every 6 (six) hours as needed for Pain., Disp: 20 tablet, Rfl: 0    methocarbamoL (ROBAXIN) 750 MG Tab, 1 tablet Orally Three times a day, Disp: , Rfl:     rosuvastatin (CRESTOR) 20 MG tablet, Take 1 tablet (20 mg total) by mouth once daily., Disp: 90 tablet, Rfl: 3    SPIRIVA WITH HANDIHALER 18 mcg inhalation capsule, 1 capsule by inhaling the contents of the capsule using the HandiHaler device Inhalation Once a day for 30 days, Disp: , Rfl:     sumatriptan (IMITREX) 50 MG tablet, Take 0.5 tablets (25 mg total) by mouth daily as needed for Migraine. May repeat x 1 dose in 2 hours if ineffective., Disp: 9 tablet, Rfl: 1    topiramate (TOPAMAX) 25 MG tablet,  "TAKE 1 TABLET BY MOUTH AT BEDTIME FOR 7 DAYS THEN  INCREASE  TO  TWICE  A  DAY, Disp: 60 tablet, Rfl: 3    neomycin-polymyxin-hydrocortisone (CORTISPORIN) 3.5-10,000-1 mg/mL-unit/mL-% otic suspension, Place 3 drops into the right ear 3 (three) times daily. (Patient not taking: Reported on 5/31/2024), Disp: 10 mL, Rfl: 0     ROS:                                                                                                                                                                             Constitutional: no fever/chills  EENT: no sore throat, ear pain, sinus pain/congestion, nasal congestion/drainage  Respiratory: no cough, no wheezing, + shortness of breath  Cardiovascular: no chest pain, no palpitations, no edema, + orthopnea  Gastrointestinal: no nausea, vomiting, or diarrhea. No abdominal pain  Neurologic: no headache, + dizziness, no weakness or numbness     Blood pressure 125/68, pulse (!) 53, temperature 97.9 °F (36.6 °C), temperature source Oral, resp. rate 18, height 5' 2" (1.575 m), weight 79.8 kg (176 lb), SpO2 100%.   PE:  General: well-developed, well-nourished, no acute distress  Eye: clear conjunctiva, eyelids normal  Head: normocephalic and atraumatic  Neck: full range of motion, supple  Respiratory:  Decreased breath sounds bilaterally without wheezes, rales, rhonchi  Cardiovascular:  Systolic murmur, bradycardic, regular rhythm. No JVD.  No lower extremity edema.  Gastrointestinal: soft, non-tender, non-distended with normal bowel sounds in all four quadrants. No masses palpated  Musculoskeletal: full range of motion of all extremities without limitation or discomfort  Integumentary: large mass present upper back, mobile.  Neurologic: AAO x 3, no dysarthria.  Psychiatric: cooperative with exam, good eye contact.            ASSESSMENT/PLAN:  Preoperative cardiovascular risk stratification  - nuclear stress and TTE are OK to proceed without further testing  - low risk for low risk surgery  - " RCRI 0     Hypertension  -BP today 150/82.    Start amlodipine  -Counseled patient on low sodium diet.    Claudication  - ADIS/Art US BLE  - bilateral symptoms R>L      RTC 4 months with above results      Neal Borges MD

## 2024-06-07 ENCOUNTER — PATIENT MESSAGE (OUTPATIENT)
Dept: SURGERY | Facility: HOSPITAL | Age: 54
End: 2024-06-07
Payer: MEDICAID

## 2024-06-08 ENCOUNTER — PATIENT MESSAGE (OUTPATIENT)
Dept: ADMINISTRATIVE | Facility: OTHER | Age: 54
End: 2024-06-08
Payer: MEDICAID

## 2024-06-09 ENCOUNTER — PATIENT MESSAGE (OUTPATIENT)
Dept: ADMINISTRATIVE | Facility: OTHER | Age: 54
End: 2024-06-09
Payer: MEDICAID

## 2024-06-10 ENCOUNTER — ANESTHESIA (OUTPATIENT)
Dept: SURGERY | Facility: HOSPITAL | Age: 54
End: 2024-06-10
Payer: MEDICAID

## 2024-06-10 ENCOUNTER — HOSPITAL ENCOUNTER (OUTPATIENT)
Facility: HOSPITAL | Age: 54
Discharge: HOME | End: 2024-06-10
Attending: SURGERY | Admitting: SURGERY
Payer: MEDICAID

## 2024-06-10 DIAGNOSIS — D17.0 LIPOMA OF NECK: Primary | ICD-10-CM

## 2024-06-10 LAB — POCT GLUCOSE: 111 MG/DL (ref 70–110)

## 2024-06-10 PROCEDURE — 25000003 PHARM REV CODE 250: Performed by: SPECIALIST

## 2024-06-10 PROCEDURE — 71000015 HC POSTOP RECOV 1ST HR: Performed by: SURGERY

## 2024-06-10 PROCEDURE — 25000003 PHARM REV CODE 250: Performed by: STUDENT IN AN ORGANIZED HEALTH CARE EDUCATION/TRAINING PROGRAM

## 2024-06-10 PROCEDURE — 63600175 PHARM REV CODE 636 W HCPCS: Performed by: SPECIALIST

## 2024-06-10 PROCEDURE — 71000033 HC RECOVERY, INTIAL HOUR: Performed by: SURGERY

## 2024-06-10 PROCEDURE — 88304 TISSUE EXAM BY PATHOLOGIST: CPT | Mod: TC | Performed by: SURGERY

## 2024-06-10 PROCEDURE — 94760 N-INVAS EAR/PLS OXIMETRY 1: CPT

## 2024-06-10 PROCEDURE — 82962 GLUCOSE BLOOD TEST: CPT | Performed by: SURGERY

## 2024-06-10 PROCEDURE — 25000242 PHARM REV CODE 250 ALT 637 W/ HCPCS: Performed by: SPECIALIST

## 2024-06-10 PROCEDURE — 37000008 HC ANESTHESIA 1ST 15 MINUTES: Performed by: SURGERY

## 2024-06-10 PROCEDURE — 71000016 HC POSTOP RECOV ADDL HR: Performed by: SURGERY

## 2024-06-10 PROCEDURE — 63600175 PHARM REV CODE 636 W HCPCS: Performed by: STUDENT IN AN ORGANIZED HEALTH CARE EDUCATION/TRAINING PROGRAM

## 2024-06-10 PROCEDURE — 36000706: Performed by: SURGERY

## 2024-06-10 PROCEDURE — 63600175 PHARM REV CODE 636 W HCPCS: Performed by: NURSE ANESTHETIST, CERTIFIED REGISTERED

## 2024-06-10 PROCEDURE — D9220A PRA ANESTHESIA: Mod: CRNA,,, | Performed by: NURSE ANESTHETIST, CERTIFIED REGISTERED

## 2024-06-10 PROCEDURE — 37000009 HC ANESTHESIA EA ADD 15 MINS: Performed by: SURGERY

## 2024-06-10 PROCEDURE — 94640 AIRWAY INHALATION TREATMENT: CPT

## 2024-06-10 PROCEDURE — 25000003 PHARM REV CODE 250: Performed by: NURSE ANESTHETIST, CERTIFIED REGISTERED

## 2024-06-10 PROCEDURE — D9220A PRA ANESTHESIA: Mod: ANES,,, | Performed by: SPECIALIST

## 2024-06-10 PROCEDURE — 36000707: Performed by: SURGERY

## 2024-06-10 PROCEDURE — 25000003 PHARM REV CODE 250: Performed by: SURGERY

## 2024-06-10 RX ORDER — HYDROMORPHONE HYDROCHLORIDE 1 MG/ML
0.2 INJECTION, SOLUTION INTRAMUSCULAR; INTRAVENOUS; SUBCUTANEOUS EVERY 5 MIN PRN
Status: DISCONTINUED | OUTPATIENT
Start: 2024-06-10 | End: 2024-06-10 | Stop reason: HOSPADM

## 2024-06-10 RX ORDER — IPRATROPIUM BROMIDE AND ALBUTEROL SULFATE 2.5; .5 MG/3ML; MG/3ML
3 SOLUTION RESPIRATORY (INHALATION) ONCE AS NEEDED
Status: DISCONTINUED | OUTPATIENT
Start: 2024-06-10 | End: 2024-06-10 | Stop reason: HOSPADM

## 2024-06-10 RX ORDER — MEPERIDINE HYDROCHLORIDE 25 MG/ML
12.5 INJECTION INTRAMUSCULAR; INTRAVENOUS; SUBCUTANEOUS ONCE
Status: DISCONTINUED | OUTPATIENT
Start: 2024-06-10 | End: 2024-06-10 | Stop reason: HOSPADM

## 2024-06-10 RX ORDER — OXYCODONE AND ACETAMINOPHEN 5; 325 MG/1; MG/1
2 TABLET ORAL ONCE
Status: COMPLETED | OUTPATIENT
Start: 2024-06-10 | End: 2024-06-10

## 2024-06-10 RX ORDER — DIPHENHYDRAMINE HYDROCHLORIDE 50 MG/ML
25 INJECTION INTRAMUSCULAR; INTRAVENOUS ONCE AS NEEDED
Status: DISCONTINUED | OUTPATIENT
Start: 2024-06-10 | End: 2024-06-10 | Stop reason: HOSPADM

## 2024-06-10 RX ORDER — TRAMADOL HYDROCHLORIDE 100 MG/1
100 TABLET, COATED ORAL EVERY 6 HOURS PRN
Qty: 20 TABLET | Refills: 0 | Status: SHIPPED | OUTPATIENT
Start: 2024-06-10 | End: 2024-06-10 | Stop reason: HOSPADM

## 2024-06-10 RX ORDER — HYDROMORPHONE HYDROCHLORIDE 1 MG/ML
0.5 INJECTION, SOLUTION INTRAMUSCULAR; INTRAVENOUS; SUBCUTANEOUS EVERY 5 MIN PRN
Status: DISCONTINUED | OUTPATIENT
Start: 2024-06-10 | End: 2024-06-10 | Stop reason: HOSPADM

## 2024-06-10 RX ORDER — KETOROLAC TROMETHAMINE 30 MG/ML
INJECTION, SOLUTION INTRAMUSCULAR; INTRAVENOUS
Status: DISCONTINUED | OUTPATIENT
Start: 2024-06-10 | End: 2024-06-10

## 2024-06-10 RX ORDER — HEPARIN SODIUM 5000 [USP'U]/ML
5000 INJECTION, SOLUTION INTRAVENOUS; SUBCUTANEOUS ONCE
Status: COMPLETED | OUTPATIENT
Start: 2024-06-10 | End: 2024-06-10

## 2024-06-10 RX ORDER — DEXAMETHASONE SODIUM PHOSPHATE 4 MG/ML
INJECTION, SOLUTION INTRA-ARTICULAR; INTRALESIONAL; INTRAMUSCULAR; INTRAVENOUS; SOFT TISSUE
Status: DISCONTINUED | OUTPATIENT
Start: 2024-06-10 | End: 2024-06-10

## 2024-06-10 RX ORDER — FENTANYL CITRATE 50 UG/ML
INJECTION, SOLUTION INTRAMUSCULAR; INTRAVENOUS
Status: DISCONTINUED | OUTPATIENT
Start: 2024-06-10 | End: 2024-06-10

## 2024-06-10 RX ORDER — MIDAZOLAM HYDROCHLORIDE 2 MG/2ML
2 INJECTION, SOLUTION INTRAMUSCULAR; INTRAVENOUS ONCE
Status: COMPLETED | OUTPATIENT
Start: 2024-06-10 | End: 2024-06-10

## 2024-06-10 RX ORDER — OXYCODONE AND ACETAMINOPHEN 5; 325 MG/1; MG/1
1 TABLET ORAL EVERY 6 HOURS PRN
Qty: 15 TABLET | Refills: 0 | Status: SHIPPED | OUTPATIENT
Start: 2024-06-10 | End: 2024-06-15

## 2024-06-10 RX ORDER — ROCURONIUM BROMIDE 10 MG/ML
INJECTION, SOLUTION INTRAVENOUS
Status: DISCONTINUED | OUTPATIENT
Start: 2024-06-10 | End: 2024-06-10

## 2024-06-10 RX ORDER — SODIUM CHLORIDE 9 MG/ML
INJECTION, SOLUTION INTRAVENOUS CONTINUOUS
Status: DISCONTINUED | OUTPATIENT
Start: 2024-06-10 | End: 2024-06-10 | Stop reason: HOSPADM

## 2024-06-10 RX ORDER — LIDOCAINE HYDROCHLORIDE 10 MG/ML
INJECTION INFILTRATION; PERINEURAL
Status: DISCONTINUED | OUTPATIENT
Start: 2024-06-10 | End: 2024-06-10 | Stop reason: HOSPADM

## 2024-06-10 RX ORDER — PROCHLORPERAZINE EDISYLATE 5 MG/ML
5 INJECTION INTRAMUSCULAR; INTRAVENOUS ONCE AS NEEDED
Status: DISCONTINUED | OUTPATIENT
Start: 2024-06-10 | End: 2024-06-10 | Stop reason: HOSPADM

## 2024-06-10 RX ORDER — DEXMEDETOMIDINE HYDROCHLORIDE 100 UG/ML
INJECTION, SOLUTION INTRAVENOUS
Status: DISCONTINUED | OUTPATIENT
Start: 2024-06-10 | End: 2024-06-10

## 2024-06-10 RX ORDER — ONDANSETRON HYDROCHLORIDE 2 MG/ML
4 INJECTION, SOLUTION INTRAVENOUS ONCE
Status: DISCONTINUED | OUTPATIENT
Start: 2024-06-10 | End: 2024-06-10 | Stop reason: HOSPADM

## 2024-06-10 RX ORDER — PROPOFOL 10 MG/ML
VIAL (ML) INTRAVENOUS
Status: DISCONTINUED | OUTPATIENT
Start: 2024-06-10 | End: 2024-06-10

## 2024-06-10 RX ORDER — SODIUM CHLORIDE, SODIUM LACTATE, POTASSIUM CHLORIDE, CALCIUM CHLORIDE 600; 310; 30; 20 MG/100ML; MG/100ML; MG/100ML; MG/100ML
INJECTION, SOLUTION INTRAVENOUS CONTINUOUS
Status: DISCONTINUED | OUTPATIENT
Start: 2024-06-10 | End: 2024-06-10 | Stop reason: HOSPADM

## 2024-06-10 RX ORDER — LIDOCAINE HYDROCHLORIDE 20 MG/ML
INJECTION INTRAVENOUS
Status: DISCONTINUED | OUTPATIENT
Start: 2024-06-10 | End: 2024-06-10

## 2024-06-10 RX ORDER — IPRATROPIUM BROMIDE AND ALBUTEROL SULFATE 2.5; .5 MG/3ML; MG/3ML
3 SOLUTION RESPIRATORY (INHALATION)
Status: COMPLETED | OUTPATIENT
Start: 2024-06-10 | End: 2024-06-10

## 2024-06-10 RX ORDER — IPRATROPIUM BROMIDE AND ALBUTEROL SULFATE 2.5; .5 MG/3ML; MG/3ML
3 SOLUTION RESPIRATORY (INHALATION) EVERY 6 HOURS PRN
Status: DISCONTINUED | OUTPATIENT
Start: 2024-06-10 | End: 2024-06-10 | Stop reason: HOSPADM

## 2024-06-10 RX ORDER — ONDANSETRON HYDROCHLORIDE 2 MG/ML
INJECTION, SOLUTION INTRAVENOUS
Status: DISCONTINUED | OUTPATIENT
Start: 2024-06-10 | End: 2024-06-10

## 2024-06-10 RX ADMIN — FENTANYL CITRATE 50 MCG: 50 INJECTION INTRAMUSCULAR; INTRAVENOUS at 07:06

## 2024-06-10 RX ADMIN — DEXAMETHASONE SODIUM PHOSPHATE 6 MG: 4 INJECTION, SOLUTION INTRA-ARTICULAR; INTRALESIONAL; INTRAMUSCULAR; INTRAVENOUS; SOFT TISSUE at 07:06

## 2024-06-10 RX ADMIN — SUGAMMADEX 200 MG: 100 INJECTION, SOLUTION INTRAVENOUS at 08:06

## 2024-06-10 RX ADMIN — HEPARIN SODIUM 5000 UNITS: 5000 INJECTION, SOLUTION INTRAVENOUS; SUBCUTANEOUS at 05:06

## 2024-06-10 RX ADMIN — PROPOFOL 120 MG: 10 INJECTION, EMULSION INTRAVENOUS at 07:06

## 2024-06-10 RX ADMIN — OXYCODONE HYDROCHLORIDE AND ACETAMINOPHEN 2 TABLET: 5; 325 TABLET ORAL at 10:06

## 2024-06-10 RX ADMIN — DEXMEDETOMIDINE 10 MCG: 200 INJECTION, SOLUTION INTRAVENOUS at 08:06

## 2024-06-10 RX ADMIN — ONDANSETRON 4 MG: 2 INJECTION INTRAMUSCULAR; INTRAVENOUS at 07:06

## 2024-06-10 RX ADMIN — SODIUM CHLORIDE, POTASSIUM CHLORIDE, SODIUM LACTATE AND CALCIUM CHLORIDE: 600; 310; 30; 20 INJECTION, SOLUTION INTRAVENOUS at 06:06

## 2024-06-10 RX ADMIN — HYDROMORPHONE HYDROCHLORIDE 0.5 MG: 1 INJECTION, SOLUTION INTRAMUSCULAR; INTRAVENOUS; SUBCUTANEOUS at 09:06

## 2024-06-10 RX ADMIN — ROCURONIUM BROMIDE 35 MG: 10 INJECTION INTRAVENOUS at 07:06

## 2024-06-10 RX ADMIN — IPRATROPIUM BROMIDE AND ALBUTEROL SULFATE 3 ML: .5; 3 SOLUTION RESPIRATORY (INHALATION) at 05:06

## 2024-06-10 RX ADMIN — KETOROLAC TROMETHAMINE 30 MG: 30 INJECTION, SOLUTION INTRAMUSCULAR at 08:06

## 2024-06-10 RX ADMIN — VANCOMYCIN HYDROCHLORIDE 1000 MG: 1 INJECTION, POWDER, LYOPHILIZED, FOR SOLUTION INTRAVENOUS at 05:06

## 2024-06-10 RX ADMIN — MIDAZOLAM HYDROCHLORIDE 2 MG: 1 INJECTION, SOLUTION INTRAMUSCULAR; INTRAVENOUS at 06:06

## 2024-06-10 RX ADMIN — LIDOCAINE HYDROCHLORIDE 40 MG: 20 INJECTION INTRAVENOUS at 07:06

## 2024-06-10 NOTE — DISCHARGE SUMMARY
[facility] - Periop Services  Discharge Note  Short Stay    Procedure(s) (LRB):  EXCISION, SOFT TISSUE (N/A)      OUTCOME: Patient tolerated treatment/procedure well without complication and is now ready for discharge.    DISPOSITION: Home or Self Care    FINAL DIAGNOSIS:  <principal problem not specified>    FOLLOWUP: In clinic    DISCHARGE INSTRUCTIONS:    Discharge Procedure Orders   Diet Adult Regular     Lifting restrictions   Order Comments: Avoid heavy lifting for 6 weeks. Avoid strenuous back exercises.     Notify your health care provider if you experience any of the following:  temperature >100.4     Notify your health care provider if you experience any of the following:  severe uncontrolled pain     Notify your health care provider if you experience any of the following:  redness, tenderness, or signs of infection (pain, swelling, redness, odor or green/yellow discharge around incision site)     No dressing needed   Order Comments: Okay to remove gauze dressing tomorrow and begin showering with soap and water like normal. Okay to let soap and water run over incision. Incision will have skin glue on it.  This will fall off with time.  Avoid soaking in tub or body of water for 2 weeks. Avoid scrubbing or applying cleansers to incision.     Activity as tolerated        TIME SPENT ON DISCHARGE: 30 minutes    Walter Walters MD  [employer] General Surgery, PGY-3

## 2024-06-10 NOTE — INTERVAL H&P NOTE
H&P Update    Patient seen and examined this morning. There are no changes to the documented H&P.    Has been NPO since 2200 yesterday.     Patient has held home blood thinners for appropriate amount of time: N/A    Planned procedure: EXCISION, SOFT TISSUE    Positioning: Prone    Pre-operative Heparin Ordered: Yes    Pre-operative Antibiotics Ordered: Yes: Vancomycin 1g    Laterality/Site Marked: Yes    All questions and concerns addressed. She confirms the procedure to be performed - excision of upper back lipoma.     Meghna Ann DO  [facility] PGY1  5:30 AM  06/10/2024

## 2024-06-10 NOTE — ANESTHESIA PROCEDURE NOTES
Intubation    Date/Time: 6/10/2024 7:12 AM    Performed by: Jia Laurent CRNA  Authorized by: Kate Freeman MD    Intubation:     Induction:  Intravenous    Intubated:  Postinduction    Mask Ventilation:  Easy with oral airway    Attempts:  1    Attempted By:  CRNA    Method of Intubation:  Direct    Blade:  Reeves 2    Laryngeal View Grade: Grade I - full view of cords      Difficult Airway Encountered?: No      Complications:  None    Airway Device:  Oral endotracheal tube    Airway Device Size:  7.5    Style/Cuff Inflation:  Cuffed (inflated to minimal occlusive pressure)    Inflation Amount (mL):  5    Tube secured:  22    Secured at:  The lips    Placement Verified By:  Capnometry    Complicating Factors:  Small mouth    Findings Post-Intubation:  BS equal bilateral and atraumatic/condition of teeth unchanged

## 2024-06-10 NOTE — OP NOTE
EXCISION, SOFT TISSUE Procedure Note  Glenda Huval Dance  MRN:  [id number]  : 1970  Procedure Date: 06/10/2024    Procedure: Excision of soft tissue mass, neck/upper back    Surgeon(s) and Role:  Staff: Jean Szymanski MD  Resident(s): Walter Walters MD; Meghna Ann DO    Pre-Operative Diagnosis: Soft tissue mass neck/upper back    Post-Operative Diagnosis: Same    Anesthesia: GETA    Indications for Procedure: 54F who presents with mass upper back/neck area that has been present since 2017 but has notably increased in size over the past 2 years, and is now giving her discomfort particularly with driving and sleeping.     Operative Findings: Large, well circumscribed lipoma measuring approximately 12 x 9 x 6 cm.    Description of Technical Procedures:   The patient was identified in the pre-op holding area by name, , and MRN. After verifying that written informed consent had been obtained, the patient was taken to the OR and placed on the table in the prone position. GETA was administered by anesthesia w/o complications. The neck/upper back was prepped and draped in the usual sterile fashion. A standard time-out was performed, again identifying the correct patient and procedure to be performed.     Using a 10#, a horizontal incision was made directly over the center of the mass. Dissection was carried down to the level of the mass using a combination of blunt dissection and electrocautery. Superficial planes were established to separate the mass from the surrounding subcutaneous tissue and this was carried down circumferentially. The soft tissue mass was carefully dissected off of the underlying muscle. It was removed in its entirety and passed off for specimen. The wound bed was thoroughly irrigated and hemostasis was achieved using bovie electrocautery. The wound was closed in multiple layers. The deep dermal layer was approximated using inturrupted 3-0 vicryl sutures, tacking to the base to close  the cavity. In a running fashion, a 3-0 vicryl was used to approximate the superficial dermis. A running 4-0 Monocryl was used to close the skin and dermabond was applied.     All suture, needle, and instrument counts were correct x2 at the conclusion of the case.     Dr. Szymanski was present for all critical portions of the case.     Estimated Blood Loss:  <50cc      Drains: none           Specimens: upper back/neck mass           Implants: None     Complications:  None           Disposition: PACU - hemodynamically stable.           Condition: stable    Meghna Ann DO  [facility] General Surgery PGY1

## 2024-06-10 NOTE — ANESTHESIA POSTPROCEDURE EVALUATION
Anesthesia Post Evaluation    Patient: Glenda Huval Dance    Procedure(s) Performed: Procedure(s) (LRB):  EXCISION, SOFT TISSUE (N/A)    Final Anesthesia Type: general      Patient location during evaluation: PACU  Patient participation: Yes- Able to Participate  Level of consciousness: awake and responds to stimulation  Post-procedure vital signs: reviewed and stable  Pain management: adequate  Airway patency: patent    PONV status at discharge: No PONV  Anesthetic complications: no      Cardiovascular status: blood pressure returned to baseline  Respiratory status: unassisted  Hydration status: euvolemic  Follow-up not needed.              Vitals Value Taken Time   /55 06/10/24 0901   Temp 36.1 °C (97 °F) 06/10/24 0846   Pulse 52 06/10/24 0901   Resp 15 06/10/24 0901   SpO2 100 % 06/10/24 0901         No case tracking events are documented in the log.      Pain/Caridad Score: Caridad Score: 4 (6/10/2024  8:46 AM)

## 2024-06-10 NOTE — DISCHARGE INSTRUCTIONS
· Keep follow up appointment on June 25th @ 8:30 AM  [facility].  Resume home medications unless otherwise instructed by your doctor.    · No heavy lifting or straining over 10 pounds for 2 weeks.    · You may take a shower starting tomorrow evening. Wash GENTLY with soap and water (do not scrub) over incision, rinse with water, and pat dry.    · Do not soak your wound in water for two weeks. Do not take baths, swim, or use a hot tub until your doctor says it is okay.    · Use pain medication as instructed. You may also alternate tylenol and ibuprofen    · You may use an ice pack as needed for 20 minutes at a time over your incision site to minimize swelling and help relieve pain.    · Do not drink alcohol or drive today, or as long as you are on pain medication.    · Notify MD of any moderate to severe pain unrelieved by pain medicine, if your incision opens and/or bleeds, or for any signs of infection including fever above 100.4, excessive redness or swelling, yellow/green foul- smelling drainage, nausea or vomiting. Clinics number is [phone]. If it is after business hours or emergency call [phone] and state Im having post op complications and need to speak to the surgeon on call.    · Thanks for choosing [facility]! Have a smooth recovery!

## 2024-06-10 NOTE — TRANSFER OF CARE
Anesthesia Transfer of Care Note    Patient: Glenda Huval Dance    Procedure(s) Performed: Procedure(s) (LRB):  EXCISION, SOFT TISSUE (N/A)    Patient location: PACU    Anesthesia Type: general    Transport from OR: Transported from OR on room air with adequate spontaneous ventilation    Post pain: adequate analgesia    Post assessment: no apparent anesthetic complications and tolerated procedure well    Post vital signs: stable    Level of consciousness: sedated    Nausea/Vomiting: no nausea/vomiting    Complications: none    Transfer of care protocol was followed

## 2024-06-11 ENCOUNTER — PATIENT MESSAGE (OUTPATIENT)
Dept: ADMINISTRATIVE | Facility: OTHER | Age: 54
End: 2024-06-11
Payer: MEDICAID

## 2024-06-12 ENCOUNTER — PATIENT MESSAGE (OUTPATIENT)
Dept: ADMINISTRATIVE | Facility: OTHER | Age: 54
End: 2024-06-12
Payer: MEDICAID

## 2024-06-12 VITALS
BODY MASS INDEX: 32.59 KG/M2 | SYSTOLIC BLOOD PRESSURE: 130 MMHG | OXYGEN SATURATION: 93 % | WEIGHT: 178.19 LBS | HEART RATE: 60 BPM | DIASTOLIC BLOOD PRESSURE: 85 MMHG | TEMPERATURE: 98 F | RESPIRATION RATE: 18 BRPM

## 2024-06-12 LAB
ESTROGEN SERPL-MCNC: NORMAL PG/ML
INSULIN SERPL-ACNC: NORMAL U[IU]/ML
LAB AP CLINICAL INFORMATION: NORMAL
LAB AP GROSS DESCRIPTION: NORMAL
LAB AP REPORT FOOTNOTES: NORMAL
T3RU NFR SERPL: NORMAL %

## 2024-06-19 ENCOUNTER — OFFICE VISIT (OUTPATIENT)
Dept: UROLOGY | Facility: CLINIC | Age: 54
End: 2024-06-19
Payer: MEDICAID

## 2024-06-19 ENCOUNTER — LAB VISIT (OUTPATIENT)
Dept: LAB | Facility: HOSPITAL | Age: 54
End: 2024-06-19
Attending: NURSE PRACTITIONER
Payer: MEDICAID

## 2024-06-19 VITALS
SYSTOLIC BLOOD PRESSURE: 125 MMHG | HEART RATE: 55 BPM | WEIGHT: 179.63 LBS | DIASTOLIC BLOOD PRESSURE: 66 MMHG | TEMPERATURE: 99 F | RESPIRATION RATE: 20 BRPM | HEIGHT: 62 IN | OXYGEN SATURATION: 97 % | BODY MASS INDEX: 33.06 KG/M2

## 2024-06-19 DIAGNOSIS — R80.9 PROTEINURIA, UNSPECIFIED TYPE: Primary | ICD-10-CM

## 2024-06-19 DIAGNOSIS — N39.3 SUI (STRESS URINARY INCONTINENCE, FEMALE): ICD-10-CM

## 2024-06-19 DIAGNOSIS — R39.15 URINARY URGENCY: Primary | ICD-10-CM

## 2024-06-19 DIAGNOSIS — R80.9 PROTEINURIA, UNSPECIFIED TYPE: ICD-10-CM

## 2024-06-19 DIAGNOSIS — N39.46 MIXED STRESS AND URGE URINARY INCONTINENCE: ICD-10-CM

## 2024-06-19 LAB
ALBUMIN SERPL-MCNC: 4.1 G/DL (ref 3.5–5)
ALBUMIN/GLOB SERPL: 1.4 RATIO (ref 1.1–2)
ALP SERPL-CCNC: 70 UNIT/L (ref 40–150)
ALT SERPL-CCNC: 28 UNIT/L (ref 0–55)
ANION GAP SERPL CALC-SCNC: 6 MEQ/L
AST SERPL-CCNC: 20 UNIT/L (ref 5–34)
BACTERIA #/AREA URNS AUTO: NORMAL /HPF
BASOPHILS # BLD AUTO: 0.04 X10(3)/MCL
BASOPHILS NFR BLD AUTO: 0.4 %
BILIRUB SERPL-MCNC: 0.7 MG/DL
BILIRUB SERPL-MCNC: NORMAL MG/DL
BILIRUB UR QL STRIP.AUTO: NEGATIVE
BLOOD URINE, POC: NORMAL
BUN SERPL-MCNC: 15.1 MG/DL (ref 9.8–20.1)
CALCIUM SERPL-MCNC: 10.5 MG/DL (ref 8.4–10.2)
CHLORIDE SERPL-SCNC: 112 MMOL/L (ref 98–107)
CLARITY UR: CLEAR
CO2 SERPL-SCNC: 25 MMOL/L (ref 22–29)
COLOR UR AUTO: YELLOW
COLOR, POC UA: YELLOW
CREAT SERPL-MCNC: 0.85 MG/DL (ref 0.55–1.02)
CREAT UR-MCNC: 26.9 MG/DL (ref 45–106)
CREAT/UREA NIT SERPL: 18
EOSINOPHIL # BLD AUTO: 0.17 X10(3)/MCL (ref 0–0.9)
EOSINOPHIL NFR BLD AUTO: 1.7 %
ERYTHROCYTE [DISTWIDTH] IN BLOOD BY AUTOMATED COUNT: 12.3 % (ref 11.5–17)
GFR SERPLBLD CREATININE-BSD FMLA CKD-EPI: >60 ML/MIN/1.73/M2
GLOBULIN SER-MCNC: 2.9 GM/DL (ref 2.4–3.5)
GLUCOSE SERPL-MCNC: 119 MG/DL (ref 74–100)
GLUCOSE UR QL STRIP: NEGATIVE
GLUCOSE UR QL STRIP: NORMAL
HCT VFR BLD AUTO: 40.6 % (ref 37–47)
HGB BLD-MCNC: 13.7 G/DL (ref 12–16)
HGB UR QL STRIP: NEGATIVE
IMM GRANULOCYTES # BLD AUTO: 0.02 X10(3)/MCL (ref 0–0.04)
IMM GRANULOCYTES NFR BLD AUTO: 0.2 %
KETONES UR QL STRIP: NEGATIVE
KETONES UR QL STRIP: NORMAL
LEUKOCYTE ESTERASE UR QL STRIP: NEGATIVE
LEUKOCYTE ESTERASE URINE, POC: NORMAL
LYMPHOCYTES # BLD AUTO: 2.55 X10(3)/MCL (ref 0.6–4.6)
LYMPHOCYTES NFR BLD AUTO: 25.6 %
MCH RBC QN AUTO: 32.5 PG (ref 27–31)
MCHC RBC AUTO-ENTMCNC: 33.7 G/DL (ref 33–36)
MCV RBC AUTO: 96.2 FL (ref 80–94)
MONOCYTES # BLD AUTO: 0.6 X10(3)/MCL (ref 0.1–1.3)
MONOCYTES NFR BLD AUTO: 6 %
NEUTROPHILS # BLD AUTO: 6.6 X10(3)/MCL (ref 2.1–9.2)
NEUTROPHILS NFR BLD AUTO: 66.1 %
NITRITE UR QL STRIP: NEGATIVE
NITRITE, POC UA: NORMAL
PH UR STRIP: 6 [PH]
PH, POC UA: 6
PLATELET # BLD AUTO: 290 X10(3)/MCL (ref 130–400)
PMV BLD AUTO: 9.5 FL (ref 7.4–10.4)
POC RESIDUAL URINE VOLUME: 0 ML (ref 0–100)
POTASSIUM SERPL-SCNC: 5.2 MMOL/L (ref 3.5–5.1)
PROT SERPL-MCNC: 7 GM/DL (ref 6.4–8.3)
PROT UR QL STRIP: ABNORMAL
PROT UR STRIP-MCNC: 19.1 MG/DL
PROTEIN, POC: 30
RBC # BLD AUTO: 4.22 X10(6)/MCL (ref 4.2–5.4)
RBC #/AREA URNS AUTO: NORMAL /HPF
SODIUM SERPL-SCNC: 143 MMOL/L (ref 136–145)
SP GR UR STRIP.AUTO: 1.02 (ref 1–1.03)
SPECIFIC GRAVITY, POC UA: 1.02
SQUAMOUS #/AREA URNS AUTO: NORMAL /HPF
URINE PROTEIN/CREATININE RATIO (OLG): 0.7
UROBILINOGEN UR STRIP-ACNC: 0.2
UROBILINOGEN, POC UA: 0.2
WBC # BLD AUTO: 9.98 X10(3)/MCL (ref 4.5–11.5)
WBC #/AREA URNS AUTO: NORMAL /HPF

## 2024-06-19 PROCEDURE — 51798 US URINE CAPACITY MEASURE: CPT | Mod: PBBFAC | Performed by: NURSE PRACTITIONER

## 2024-06-19 PROCEDURE — 80053 COMPREHEN METABOLIC PANEL: CPT

## 2024-06-19 PROCEDURE — 81003 URINALYSIS AUTO W/O SCOPE: CPT

## 2024-06-19 PROCEDURE — 84156 ASSAY OF PROTEIN URINE: CPT

## 2024-06-19 PROCEDURE — 81001 URINALYSIS AUTO W/SCOPE: CPT | Mod: PBBFAC | Performed by: NURSE PRACTITIONER

## 2024-06-19 PROCEDURE — 99215 OFFICE O/P EST HI 40 MIN: CPT | Mod: PBBFAC,25 | Performed by: NURSE PRACTITIONER

## 2024-06-19 PROCEDURE — 85025 COMPLETE CBC W/AUTO DIFF WBC: CPT

## 2024-06-19 PROCEDURE — 36415 COLL VENOUS BLD VENIPUNCTURE: CPT

## 2024-06-19 RX ORDER — MIRABEGRON 25 MG/1
25 TABLET, FILM COATED, EXTENDED RELEASE ORAL DAILY
Qty: 30 TABLET | Refills: 11 | Status: SHIPPED | OUTPATIENT
Start: 2024-06-19 | End: 2025-06-19

## 2024-06-19 NOTE — PROGRESS NOTES
Chief Complaint:   Chief Complaint   Patient presents with    mixed stress and urge incontinence       HPI:   Patient is a 54-year-old female referred to Urology for mixed urinary stress incontinence.  Patient seen by PCP on 05/22/2024 with complaints of mixed urinary stress incontinence for past several years.  Today patient complaints with urinary urgency up to 10 times during the day she does admit to drinking 2 cups of coffee and 1 time at night.  Patient also states her stress urinary incontinence is not as bad as the urinary urgency.  .  Bladder scan less than 10 mL.  Allergies:  Review of patient's allergies indicates:   Allergen Reactions    Penicillins Anaphylaxis and Swelling       Medications:  Current Outpatient Medications   Medication Sig Dispense Refill    albuterol (VENTOLIN HFA) 90 mcg/actuation inhaler Inhale 2 puffs into the lungs every 4 (four) hours as needed for Wheezing or Shortness of Breath. Rescue 18 g 2    albuterol-ipratropium (DUO-NEB) 2.5 mg-0.5 mg/3 mL nebulizer solution Take 3 mLs by nebulization every 4 (four) hours. Rescue 75 mL 0    amLODIPine (NORVASC) 5 MG tablet Take 1 tablet (5 mg total) by mouth once daily. 90 tablet 3    budesonide-formoterol 160-4.5 mcg (SYMBICORT) 160-4.5 mcg/actuation HFAA Inhale 2 puffs into the lungs every 12 (twelve) hours. Controller 10.2 g 3    cetirizine (ZYRTEC) 10 MG tablet Take 1 tablet (10 mg total) by mouth every evening. 90 tablet 3    fluticasone propionate (FLONASE) 50 mcg/actuation nasal spray 2 sprays by Each Nostril route 2 (two) times daily.      rosuvastatin (CRESTOR) 20 MG tablet Take 1 tablet (20 mg total) by mouth once daily. 90 tablet 3    SPIRIVA WITH HANDIHALER 18 mcg inhalation capsule 1 capsule by inhaling the contents of the capsule using the HandiHaler device Inhalation Once a day for 30 days      sumatriptan (IMITREX) 50 MG tablet Take 0.5 tablets (25 mg total) by mouth daily as needed for Migraine. May repeat x 1 dose in 2  hours if ineffective. 9 tablet 1    topiramate (TOPAMAX) 25 MG tablet TAKE 1 TABLET BY MOUTH AT BEDTIME FOR 7 DAYS THEN  INCREASE  TO  TWICE  A  DAY 60 tablet 3    ibuprofen (ADVIL,MOTRIN) 600 MG tablet Take 1 tablet (600 mg total) by mouth every 6 (six) hours as needed for Pain. (Patient not taking: Reported on 2024) 20 tablet 0    methocarbamoL (ROBAXIN) 750 MG Tab 1 tablet Orally Three times a day (Patient not taking: Reported on 2024)       No current facility-administered medications for this visit.       Review of Systems:  General: No fever, chills, fatigability, or weight loss.  Skin: No rashes, itching, or changes in color or texture of skin.  Chest: Denies WILLIS, cyanosis, wheezing, cough, and sputum production.  Abdomen: Appetite fine. No weight loss. Denies diarrhea, abdominal pain, hematemesis, or blood in stool.  Musculoskeletal: No joint stiffness or swelling. Denies back pain.  :  Patient denies any dysuria, urinary retention, nocturia, gross hematuria.   All other review of systems negative.    PMH:  Past Medical History:   Diagnosis Date    COPD (chronic obstructive pulmonary disease)     Hyperlipidemia     Sensorineural hearing loss (SNHL) of both ears        PSH:  Past Surgical History:   Procedure Laterality Date     SECTION      EAR RECONSTRUCTION Right 2012    EXCISION OF SOFT TISSUE N/A 6/10/2024    Procedure: EXCISION, SOFT TISSUE;  Surgeon: Jean Szymanski Jr., MD;  Location: [facility];  Service: General;  Laterality: N/A;  Upper Back    HYSTERECTOMY         FamHx:  Family History   Problem Relation Name Age of Onset    Diabetes Mother      Cancer Mother      Heart disease Father      Cancer Sister Michelle     Cancer Sister         SocHx:  Social History     Socioeconomic History    Marital status:    Tobacco Use    Smoking status: Former     Current packs/day: 0.00     Average packs/day: 1 pack/day for 43.0 years (43.0 ttl pk-yrs)     Types: Cigarettes      Quit date: 1/15/2024     Years since quittin.4     Passive exposure: Current    Smokeless tobacco: Never   Substance and Sexual Activity    Alcohol use: Not Currently     Comment: occasionally    Drug use: Not Currently     Frequency: 1.0 times per week     Types: Marijuana     Comment: Sometimes at night  to sleep    Sexual activity: Yes     Partners: Male     Birth control/protection: None     Social Determinants of Health     Financial Resource Strain: Medium Risk (2024)    Overall Financial Resource Strain (CARDIA)     Difficulty of Paying Living Expenses: Somewhat hard   Food Insecurity: Food Insecurity Present (2024)    Hunger Vital Sign     Worried About Running Out of Food in the Last Year: Sometimes true     Ran Out of Food in the Last Year: Sometimes true   Transportation Needs: No Transportation Needs (2024)    PRAPARE - Transportation     Lack of Transportation (Medical): No     Lack of Transportation (Non-Medical): No   Physical Activity: Inactive (2024)    Exercise Vital Sign     Days of Exercise per Week: 0 days     Minutes of Exercise per Session: 0 min   Stress: No Stress Concern Present (2024)    [employer] - Occupational Stress Questionnaire     Feeling of Stress : Not at all   Recent Concern: Stress - Stress Concern Present (2024)    [employer] - Occupational Stress Questionnaire     Feeling of Stress : To some extent   Housing Stability: High Risk (2024)    Housing Stability Vital Sign     Unable to Pay for Housing in the Last Year: Yes     Number of Places Lived in the Last Year: 1     Unstable Housing in the Last Year: No       Physical Exam:  Vitals:    24 1403   BP: 125/66   Pulse: (!) 55   Resp: 20   Temp: 98.6 °F (37 °C)     General: A&Ox3, no apparent distress, no deformities  Neck: No masses, normal thyroid  Lungs: CTA murtaza, no use of accessory muscles  Heart: RRR, no arrhythmias  Abdomen:  Soft, NT, ND, no masses, no hernias, no hepatosplenomegaly  Lymphatic: Neck and groin nodes negative  Skin: The skin is warm and dry. No jaundice.  Ext: No c/c/e.    Urinalysis:  Results for orders placed or performed in visit on 06/19/24   POCT URINE DIPSTICK WITH MICROSCOPE, AUTOMATED   Result Value Ref Range    Color, UA Yellow     Spec Grav UA 1.020     pH, UA 6.0     WBC, UA neg     Nitrite, UA neg     Protein, POC 30     Glucose, UA neg     Ketones, UA neg     Urobilinogen, UA 0.2     Bilirubin, POC neg     Blood, UA neg    Microscopic hematuria revealed negative RBCs, WBCs, nitrites.    Imaging:       Impression:  1. Mixed stress and urge urinary incontinence  - Ambulatory referral/consult to Urology  - POCT URINE DIPSTICK WITH MICROSCOPE, AUTOMATED  - POCT Bladder Scan      Plan:  Instructed patient start Myrbetriq 25 mg p.o. daily.  Instructed patient on timed voiding every 2-3 hrs, double voiding, avoidance of bladder irritants such as alcohol, citrus foods, chocolate, caffeinated drinks, energy drinks, spicy foods, sugar, caffeine products, sodas. Instructed patient to avoid drinking fluids 1-2 hours prior to bedtime & void immediately before bedtime.   3.   Instructed patient RTC one-month for re-evaluation.  4.   Instructed patient develops any abnormal urologic symptoms notify clinic to be re-evaluate treated or go to emergency room during after hours..

## 2024-06-19 NOTE — PROGRESS NOTES
Placed in room. Seen by Winston Cuba NP. Spoke with patient. Bladder scan at 0 ml. RTC in 1 month.

## 2024-06-25 ENCOUNTER — HOSPITAL ENCOUNTER (OUTPATIENT)
Dept: CARDIOLOGY | Facility: HOSPITAL | Age: 54
Discharge: HOME | End: 2024-06-25
Attending: INTERNAL MEDICINE
Payer: MEDICAID

## 2024-06-25 ENCOUNTER — OFFICE VISIT (OUTPATIENT)
Dept: SURGERY | Facility: CLINIC | Age: 54
End: 2024-06-25
Payer: MEDICAID

## 2024-06-25 VITALS
HEART RATE: 53 BPM | WEIGHT: 181 LBS | TEMPERATURE: 98 F | BODY MASS INDEX: 33.31 KG/M2 | DIASTOLIC BLOOD PRESSURE: 66 MMHG | SYSTOLIC BLOOD PRESSURE: 136 MMHG | OXYGEN SATURATION: 97 % | RESPIRATION RATE: 19 BRPM | HEIGHT: 62 IN

## 2024-06-25 DIAGNOSIS — D17.0 LIPOMA OF NECK: Primary | ICD-10-CM

## 2024-06-25 DIAGNOSIS — E78.2 MIXED HYPERLIPIDEMIA: Chronic | ICD-10-CM

## 2024-06-25 DIAGNOSIS — I73.9 PAD (PERIPHERAL ARTERY DISEASE): ICD-10-CM

## 2024-06-25 PROCEDURE — 99214 OFFICE O/P EST MOD 30 MIN: CPT | Mod: PBBFAC,25

## 2024-06-25 PROCEDURE — 93924 LWR XTR VASC STDY BILAT: CPT

## 2024-06-25 PROCEDURE — 93925 LOWER EXTREMITY STUDY: CPT | Mod: 26,,, | Performed by: SURGERY

## 2024-06-25 PROCEDURE — 93925 LOWER EXTREMITY STUDY: CPT

## 2024-06-25 NOTE — PROGRESS NOTES
[facility] GENERAL SURGERY   Clinic Note       CC: Post-op       HPI: Glenda Huval Dance is a 54 y.o. female with PMHx of COPD, HTN, and HLD who presents for follow up s/p posterior neck soft tissue mass excision on 6/10/24. She has been doing well since surgery. She reports soreness around the incision that has been slowly improving. She denies fevers, chills, nausea, or vomiting.        Physical Exam:   Vitals:    06/25/24 1110   BP: 136/66   Pulse: (!) 53   Resp: 19   Temp: 97.5 °F (36.4 °C)      Gen: NAD, AAOx3   HEENT: Posterior neck incision c/d/i, no erythema or drainage  Eye: ROBERT, EOMI   CVS: RRR   Chest: Non-labored breathing on room air   Abd: s/nt/nd   Ext: Moves all 4 extremities.       Surgical Pathology 6/10/24:    Final Diagnosis      Upper back mass, excision:  - Lipoma.          A/P: Glenda Huval Dance is a 54 y.o. female with PMHx of COPD, HTN, and HLD who presents for follow up s/p posterior neck soft tissue mass excision on 6/10/24.       - Doing well post-op  - Incision healing appropriately   - Path consistent with lipoma  - Follow up PRN      Shirley Powell MD  [facility] General Surgery PGY-3

## 2024-06-26 ENCOUNTER — OFFICE VISIT (OUTPATIENT)
Dept: NEPHROLOGY | Facility: CLINIC | Age: 54
End: 2024-06-26
Payer: MEDICAID

## 2024-06-26 VITALS
HEIGHT: 62 IN | BODY MASS INDEX: 33.39 KG/M2 | OXYGEN SATURATION: 97 % | WEIGHT: 181.44 LBS | HEART RATE: 52 BPM | DIASTOLIC BLOOD PRESSURE: 72 MMHG | RESPIRATION RATE: 18 BRPM | SYSTOLIC BLOOD PRESSURE: 126 MMHG | TEMPERATURE: 98 F

## 2024-06-26 DIAGNOSIS — I10 PRIMARY HYPERTENSION: ICD-10-CM

## 2024-06-26 DIAGNOSIS — R80.9 PROTEINURIA, UNSPECIFIED TYPE: Primary | ICD-10-CM

## 2024-06-26 DIAGNOSIS — R31.9 HEMATURIA SYNDROME: ICD-10-CM

## 2024-06-26 LAB
IMMEDIATE ARM BP: 150 MMHG
IMMEDIATE LEFT ABI: 1.11
IMMEDIATE LEFT TIBIAL: 166 MMHG
IMMEDIATE RIGHT ABI: 1.13
IMMEDIATE RIGHT TIBIAL: 169 MMHG
LEFT ABI: 1.22
LEFT ARM BP: 140 MMHG
LEFT DORSALIS PEDIS: 157 MMHG
LEFT POSTERIOR TIBIAL: 173 MMHG
OHS CV LEFT LOWER EXTREMITY ABI (NO CALC): 1.1
OHS CV RIGHT ABI LOWER EXTREMITY (NO CALC): 1.1
RIGHT ABI: 1.11
RIGHT ARM BP: 142 MMHG
RIGHT DORSALIS PEDIS: 149 MMHG
RIGHT POSTERIOR TIBIAL: 158 MMHG

## 2024-06-26 PROCEDURE — 3062F POS MACROALBUMINURIA REV: CPT | Mod: CPTII,,, | Performed by: NURSE PRACTITIONER

## 2024-06-26 PROCEDURE — 3078F DIAST BP <80 MM HG: CPT | Mod: CPTII,,, | Performed by: NURSE PRACTITIONER

## 2024-06-26 PROCEDURE — 99215 OFFICE O/P EST HI 40 MIN: CPT | Mod: PBBFAC | Performed by: NURSE PRACTITIONER

## 2024-06-26 PROCEDURE — 3044F HG A1C LEVEL LT 7.0%: CPT | Mod: CPTII,,, | Performed by: NURSE PRACTITIONER

## 2024-06-26 PROCEDURE — 3008F BODY MASS INDEX DOCD: CPT | Mod: CPTII,,, | Performed by: NURSE PRACTITIONER

## 2024-06-26 PROCEDURE — 99204 OFFICE O/P NEW MOD 45 MIN: CPT | Mod: S$PBB,,, | Performed by: NURSE PRACTITIONER

## 2024-06-26 PROCEDURE — 1160F RVW MEDS BY RX/DR IN RCRD: CPT | Mod: CPTII,,, | Performed by: NURSE PRACTITIONER

## 2024-06-26 PROCEDURE — 3066F NEPHROPATHY DOC TX: CPT | Mod: CPTII,,, | Performed by: NURSE PRACTITIONER

## 2024-06-26 PROCEDURE — 1159F MED LIST DOCD IN RCRD: CPT | Mod: CPTII,,, | Performed by: NURSE PRACTITIONER

## 2024-06-26 PROCEDURE — 3074F SYST BP LT 130 MM HG: CPT | Mod: CPTII,,, | Performed by: NURSE PRACTITIONER

## 2024-06-26 RX ORDER — IBUPROFEN 200 MG
200 TABLET ORAL EVERY 6 HOURS PRN
COMMUNITY

## 2024-06-26 RX ORDER — CYCLOBENZAPRINE HCL 5 MG
5 TABLET ORAL NIGHTLY
Qty: 15 TABLET | Refills: 0 | Status: SHIPPED | OUTPATIENT
Start: 2024-06-26 | End: 2024-07-11

## 2024-06-27 NOTE — PROGRESS NOTES
[facility]  Nephrology Clinic Note    Chief complaint: Referral (New pt, referred, took meds, dyspnea, wheezing, sister has liver failure, c/o needle pain in legs, no family renal hx)    History of present illness:   Glenda Huval Dance is a 54 y.o. White female with past medical history of persistent microscopic hematuria, nonnephrotic range proteinuria, COPD, hypertension, dyslipidemia, and sensorineural hearing loss since childhood.  Patient presents to establish care in Nephrology Clinic.  Tells me that she recently had a large lipoma excised from right posterior neck.  States she has been suffering from headaches and used BC powder on a daily basis, currently uses ibuprofen on an as needed basis for incisional pain.  Denies complaints besides some soreness of posterior neck.     Review of Systems  12 point review of systems conducted, negative except as stated in the history of present illness.    Allergies: Patient is allergic to penicillins.     Past Medical History:  has a past medical history of COPD (chronic obstructive pulmonary disease), Hyperlipidemia, and Sensorineural hearing loss (SNHL) of both ears.    Procedure History:  has a past surgical history that includes Hysterectomy; Ear reconstruction (Right, );  section (); and Excision of soft tissue (N/A, 6/10/2024).    Family History: family history includes Cancer in her mother, sister, and sister; Diabetes in her mother; Heart disease in her father; Hypertension in her mother; Liver disease in her sister.    Social History:  reports that she quit smoking about 5 months ago. Her smoking use included cigarettes. She has a 43 pack-year smoking history. She has been exposed to tobacco smoke. She has never used smokeless tobacco. She reports that she does not currently use alcohol after a past usage of about 2.0 standard drinks of alcohol per week. She reports that she does not currently use drugs after having  "used the following drugs: Marijuana. Frequency: 1.00 time per week.    Physical exam  /72 (BP Location: Right arm, Patient Position: Sitting, BP Method: Medium (Automatic))   Pulse (!) 52 Comment: 55 apical  Temp 98.2 °F (36.8 °C) (Oral)   Resp 18   Ht 5' 1.81" (1.57 m)   Wt 82.3 kg (181 lb 7 oz)   SpO2 97%   BMI 33.39 kg/m²   General appearance: Patient is in no acute distress.  Skin:  Posterior neck incision is healing well, no signs of infection noted.  HEENT: PERRLA, EOMI, no scleral icterus, no JVD. Neck is supple.  Chest: Respirations are unlabored. Lungs sounds are clear.   Heart: S1, S2.   Abdomen: Benign.  : Deferred.  Extremities: No edema, peripheral pulses are palpable.   Neuro: No focal deficits.     Home Medications:    Current Outpatient Medications:     albuterol (VENTOLIN HFA) 90 mcg/actuation inhaler, Inhale 2 puffs into the lungs every 4 (four) hours as needed for Wheezing or Shortness of Breath. Rescue, Disp: 18 g, Rfl: 2    albuterol-ipratropium (DUO-NEB) 2.5 mg-0.5 mg/3 mL nebulizer solution, Take 3 mLs by nebulization every 4 (four) hours. Rescue, Disp: 75 mL, Rfl: 0    amLODIPine (NORVASC) 5 MG tablet, Take 1 tablet (5 mg total) by mouth once daily., Disp: 90 tablet, Rfl: 3    budesonide-formoterol 160-4.5 mcg (SYMBICORT) 160-4.5 mcg/actuation HFAA, Inhale 2 puffs into the lungs every 12 (twelve) hours. Controller, Disp: 10.2 g, Rfl: 3    cetirizine (ZYRTEC) 10 MG tablet, Take 1 tablet (10 mg total) by mouth every evening., Disp: 90 tablet, Rfl: 3    fluticasone propionate (FLONASE) 50 mcg/actuation nasal spray, 2 sprays by Each Nostril route 2 (two) times daily., Disp: , Rfl:     mirabegron (MYRBETRIQ) 25 mg Tb24 ER tablet, Take 1 tablet (25 mg total) by mouth once daily., Disp: 30 tablet, Rfl: 11    rosuvastatin (CRESTOR) 20 MG tablet, Take 1 tablet (20 mg total) by mouth once daily., Disp: 90 tablet, Rfl: 3    SPIRIVA WITH HANDIHALER 18 mcg inhalation capsule, 1 capsule by " inhaling the contents of the capsule using the HandiHaler device Inhalation Once a day for 30 days, Disp: , Rfl:     sumatriptan (IMITREX) 50 MG tablet, Take 0.5 tablets (25 mg total) by mouth daily as needed for Migraine. May repeat x 1 dose in 2 hours if ineffective., Disp: 9 tablet, Rfl: 1    topiramate (TOPAMAX) 25 MG tablet, TAKE 1 TABLET BY MOUTH AT BEDTIME FOR 7 DAYS THEN  INCREASE  TO  TWICE  A  DAY, Disp: 60 tablet, Rfl: 3    cyclobenzaprine (FLEXERIL) 5 MG tablet, Take 1 tablet (5 mg total) by mouth nightly. for 15 days, Disp: 15 tablet, Rfl: 0    ibuprofen (ADVIL,MOTRIN) 200 MG tablet, Take 200 mg by mouth every 6 (six) hours as needed for Pain., Disp: , Rfl:     ibuprofen (ADVIL,MOTRIN) 600 MG tablet, Take 1 tablet (600 mg total) by mouth every 6 (six) hours as needed for Pain., Disp: 20 tablet, Rfl: 0    Laboratory data    Lab Results   Component Value Date    WBC 9.98 06/19/2024    HGB 13.7 06/19/2024    HCT 40.6 06/19/2024     06/19/2024     06/19/2024    K 5.2 (H) 06/19/2024    CO2 25 06/19/2024    BUN 15.1 06/19/2024    CREATININE 0.85 06/19/2024    EGFRNORACEVR >60 06/19/2024    GLUCOSE 119 (H) 06/19/2024    CALCIUM 10.5 (H) 06/19/2024    ALKPHOS 70 06/19/2024    LABPROT 7.0 06/19/2024    ALBUMIN 4.1 06/19/2024    BILIDIR <0.1 12/26/2018    IBILI unable to calc 12/26/2018    AST 20 06/19/2024    ALT 28 06/19/2024    MG 1.80 02/21/2024      Lab Results   Component Value Date    HGBA1C 5.7 03/07/2024     Urine:  Lab Results   Component Value Date    APPEARANCEUA Clear 06/19/2024    SGUA 1.020 06/19/2024    PROTEINUA Trace (A) 06/19/2024    KETONESUA Negative 06/19/2024    LEUKOCYTESUR Negative 06/19/2024    RBCUA None Seen 06/19/2024    WBCUA None Seen 06/19/2024    BACTERIA None Seen 06/19/2024    SQEPUA Trace (A) 01/17/2024    HYALINECASTS None Seen 01/17/2024    CREATRANDUR 26.9 (L) 06/19/2024    PROTEINURINE 19.1 06/19/2024    UPROTCREA 0.7 06/19/2024          Imaging  Reviewed    Impression    ICD-10-CM ICD-9-CM   1. Proteinuria, unspecified type  R80.9 791.0   2. Hematuria syndrome  R31.9 599.70   3. Primary hypertension  I10 401.9   4. BMI 33.0-33.9,adult  Z68.33 V85.33        Plan  Proteinuria, unspecified type  -     Ambulatory referral/consult to Nephrology  -     [facility]; Future; Expected date: 07/11/2024  -     Comprehensive Metabolic Panel; Future; Expected date: 08/10/2024  -     Protein/Creatinine Ratio, Urine; Future; Expected date: 08/10/2024  -     Urinalysis, Reflex to Urine Culture; Future; Expected date: 08/10/2024  -     Miscellaneous Test, Sendout [facility] Antinuclear Antibody (DRAKE) with HEp-2 Substrate, IgG by IFA with Reflex by Pattern test # [id number]; Future; Expected date: 08/10/2024  -     Miscellaneous Test, Sendout ANCA-Associated Vasculitis Profile (ANCA/MPO/PR3)  [facility] test # [id number]; Future; Expected date: 08/10/2024  -     C3 Complement; Future; Expected date: 08/10/2024  -     C4 Complement; Future; Expected date: 08/10/2024  -     Immunofixation & Protein Electrophoresis & Immunoglobulins; Future; Expected date: 08/10/2024  Patient reports history of sensorineural hearing loss since childhood, states that her mother had similar problems with hearing loss and possibly hematuria.   Based on patient's medical history, family history, developmental history, and previous test results, her risk to test positive for inherited cause of CKD is increased.   After discussing the risks, benefits, and limitations of genetic testing, patient elected to proceed with [facility] panel. Based on the KDIGO guidelines, patient meets the clinical criteria required for coverage of multi-gene panel test. The results could affect patient's clinical management recommendations.  Follow up in about 8 weeks (around 8/21/2024).  to discuss the results. Medical management recommendations will be made based on the result of patient's genetic  analysis.  Will also order kidney ultrasound, DRAKE, complement levels, SPEP, and ANCA.        Hematuria syndrome  -     US Retroperitoneal Limited; Future; Expected date: 07/11/2024  Will order kidney ultrasound, ANCA and Renasight.     Primary hypertension  Blood pressure reading is at goal, continue current antihypertensive regimen and 2 g a day dietary sodium restriction.      BMI 33.0-33.9,adult  Lifestyle and dietary interventions discussed, patient encouraged to maintain non-sedentary lifestyle and well-balanced diet.     Other orders  -     cyclobenzaprine (FLEXERIL) 5 MG tablet; Take 1 tablet (5 mg total) by mouth nightly. for 15 days  Dispense: 15 tablet; Refill: 0        Follow up in about 8 weeks (around 8/21/2024).       Noemy Souza NP  [facility] Nephrology

## 2024-07-16 ENCOUNTER — HOSPITAL ENCOUNTER (OUTPATIENT)
Dept: RADIOLOGY | Facility: HOSPITAL | Age: 54
Discharge: HOME | End: 2024-07-16
Attending: NURSE PRACTITIONER
Payer: MEDICAID

## 2024-07-16 DIAGNOSIS — R31.9 HEMATURIA SYNDROME: ICD-10-CM

## 2024-07-16 DIAGNOSIS — R80.9 PROTEINURIA, UNSPECIFIED TYPE: ICD-10-CM

## 2024-07-16 PROCEDURE — 76775 US EXAM ABDO BACK WALL LIM: CPT | Mod: TC

## 2024-07-17 ENCOUNTER — TELEPHONE (OUTPATIENT)
Dept: NEPHROLOGY | Facility: CLINIC | Age: 54
End: 2024-07-17
Payer: MEDICAID

## 2024-07-17 NOTE — TELEPHONE ENCOUNTER
----- Message from FLORESITA Gunn sent at 7/17/2024 10:46 AM CDT -----  Please let the patient know that I reviewed ultrasound results, there are no concerning findings.  Will discuss details during next office visit.

## 2024-07-17 NOTE — PROGRESS NOTES
Please let the patient know that I reviewed ultrasound results, there are no concerning findings.  Will discuss details during next office visit.

## 2024-07-31 DIAGNOSIS — J43.9 PULMONARY EMPHYSEMA, UNSPECIFIED EMPHYSEMA TYPE: ICD-10-CM

## 2024-07-31 RX ORDER — BUDESONIDE AND FORMOTEROL FUMARATE DIHYDRATE 160; 4.5 UG/1; UG/1
2 AEROSOL RESPIRATORY (INHALATION) EVERY 12 HOURS
Qty: 11 G | Refills: 0 | Status: SHIPPED | OUTPATIENT
Start: 2024-07-31

## 2024-08-05 ENCOUNTER — OFFICE VISIT (OUTPATIENT)
Dept: UROLOGY | Facility: CLINIC | Age: 54
End: 2024-08-05
Payer: MEDICAID

## 2024-08-05 VITALS
BODY MASS INDEX: 33.13 KG/M2 | RESPIRATION RATE: 18 BRPM | SYSTOLIC BLOOD PRESSURE: 123 MMHG | HEIGHT: 62 IN | DIASTOLIC BLOOD PRESSURE: 90 MMHG | WEIGHT: 180 LBS | HEART RATE: 68 BPM | OXYGEN SATURATION: 98 %

## 2024-08-05 DIAGNOSIS — N39.46 MIXED STRESS AND URGE URINARY INCONTINENCE: ICD-10-CM

## 2024-08-05 DIAGNOSIS — R39.15 URINARY URGENCY: Primary | ICD-10-CM

## 2024-08-05 LAB
BILIRUB SERPL-MCNC: NEGATIVE MG/DL
BLOOD URINE, POC: NORMAL
COLOR, POC UA: YELLOW
GLUCOSE UR QL STRIP: NEGATIVE
KETONES UR QL STRIP: NEGATIVE
LEUKOCYTE ESTERASE URINE, POC: NEGATIVE
NITRITE, POC UA: NEGATIVE
PH, POC UA: 5.5
POC RESIDUAL URINE VOLUME: 43 ML (ref 0–100)
PROTEIN, POC: 30
SPECIFIC GRAVITY, POC UA: 1.02
UROBILINOGEN, POC UA: 0.2

## 2024-08-05 PROCEDURE — 3066F NEPHROPATHY DOC TX: CPT | Mod: CPTII,,, | Performed by: NURSE PRACTITIONER

## 2024-08-05 PROCEDURE — 1159F MED LIST DOCD IN RCRD: CPT | Mod: CPTII,,, | Performed by: NURSE PRACTITIONER

## 2024-08-05 PROCEDURE — 81001 URINALYSIS AUTO W/SCOPE: CPT | Mod: PBBFAC | Performed by: NURSE PRACTITIONER

## 2024-08-05 PROCEDURE — 3080F DIAST BP >= 90 MM HG: CPT | Mod: CPTII,,, | Performed by: NURSE PRACTITIONER

## 2024-08-05 PROCEDURE — 3044F HG A1C LEVEL LT 7.0%: CPT | Mod: CPTII,,, | Performed by: NURSE PRACTITIONER

## 2024-08-05 PROCEDURE — 3074F SYST BP LT 130 MM HG: CPT | Mod: CPTII,,, | Performed by: NURSE PRACTITIONER

## 2024-08-05 PROCEDURE — 51798 US URINE CAPACITY MEASURE: CPT | Mod: PBBFAC | Performed by: NURSE PRACTITIONER

## 2024-08-05 PROCEDURE — 3062F POS MACROALBUMINURIA REV: CPT | Mod: CPTII,,, | Performed by: NURSE PRACTITIONER

## 2024-08-05 PROCEDURE — 99214 OFFICE O/P EST MOD 30 MIN: CPT | Mod: S$PBB,,, | Performed by: NURSE PRACTITIONER

## 2024-08-05 PROCEDURE — 3008F BODY MASS INDEX DOCD: CPT | Mod: CPTII,,, | Performed by: NURSE PRACTITIONER

## 2024-08-05 PROCEDURE — 99214 OFFICE O/P EST MOD 30 MIN: CPT | Mod: PBBFAC | Performed by: NURSE PRACTITIONER

## 2024-08-05 RX ORDER — ONDANSETRON 4 MG/1
TABLET, ORALLY DISINTEGRATING ORAL
COMMUNITY
Start: 2024-08-02

## 2024-08-05 RX ORDER — CIPROFLOXACIN 500 MG/1
500 TABLET ORAL 2 TIMES DAILY
COMMUNITY
Start: 2024-08-02

## 2024-08-05 RX ORDER — KETOROLAC TROMETHAMINE 10 MG/1
10 TABLET, FILM COATED ORAL EVERY 6 HOURS PRN
COMMUNITY
Start: 2024-08-02

## 2024-08-05 RX ORDER — MIRABEGRON 50 MG/1
50 TABLET, EXTENDED RELEASE ORAL DAILY
Qty: 90 TABLET | Refills: 3 | Status: SHIPPED | OUTPATIENT
Start: 2024-08-05 | End: 2025-08-05

## 2024-08-20 ENCOUNTER — OFFICE VISIT (OUTPATIENT)
Dept: UROLOGY | Facility: CLINIC | Age: 54
End: 2024-08-20
Payer: MEDICAID

## 2024-08-20 DIAGNOSIS — N39.46 MIXED STRESS AND URGE URINARY INCONTINENCE: Primary | ICD-10-CM

## 2024-08-20 PROCEDURE — 1159F MED LIST DOCD IN RCRD: CPT | Mod: CPTII,95,, | Performed by: NURSE PRACTITIONER

## 2024-08-20 PROCEDURE — 1160F RVW MEDS BY RX/DR IN RCRD: CPT | Mod: CPTII,95,, | Performed by: NURSE PRACTITIONER

## 2024-08-20 PROCEDURE — 3062F POS MACROALBUMINURIA REV: CPT | Mod: CPTII,95,, | Performed by: NURSE PRACTITIONER

## 2024-08-20 PROCEDURE — 99442 PR PHYSICIAN TELEPHONE EVALUATION 11-20 MIN: CPT | Mod: 95,,, | Performed by: NURSE PRACTITIONER

## 2024-08-20 PROCEDURE — 3066F NEPHROPATHY DOC TX: CPT | Mod: CPTII,95,, | Performed by: NURSE PRACTITIONER

## 2024-08-20 PROCEDURE — 3044F HG A1C LEVEL LT 7.0%: CPT | Mod: CPTII,95,, | Performed by: NURSE PRACTITIONER

## 2024-08-20 NOTE — PROGRESS NOTES
Established Patient - Audio Only Telehealth Visit     The patient location is:  Home  The chief complaint leading to consultation is:  Overactive bladder  Visit type: Virtual visit with audio only (telephone)  Total time spent with patient:  25  minutes     The reason for the audio only service rather than synchronous audio and video virtual visit was related to technical difficulties or patient preference/necessity.     Each patient to whom I provide medical services by telemedicine is:  (1) informed of the relationship between the physician and patient and the respective role of any other health care provider with respect to management of the patient; and (2) notified that they may decline to receive medical services by telemedicine and may withdraw from such care at any time. Patient verbally consented to receive this service via voice-only telephone call.     This service was not originating from a related E/M service provided within the previous 7 days nor will  to an E/M service or procedure within the next 24 hours or my soonest available appointment.  Prevailing standard of care was able to be met in this audio-only visit.  Chief Complaint:  Urinary frequency      HPI:   Patient is a 54-year-old female here on this audio virtual visit for 2 week follow-up mixed urinary stress incontinence.  Patient seen by PCP on 05/22/2024 with complaints of mixed urinary stress incontinence for past several years.  On patient's last visit she was switched from Myrbetriq 50 mg p.o. daily to Gemtesa 75 mg p.o. daily.  Patient today states her urinary frequency proximally 8-10 times during the day and 2 times at night with Myrbetriq, however with Gemtesa symptoms of urinary frequency and urgency during the day or down to 4-5 and at night 1-2 times.  Patient to continue Gemtesa 75 mg p.o. daily.  Allergies:  Review of patient's allergies indicates:   Allergen Reactions    Penicillins Anaphylaxis and Swelling        Medications:  Current Outpatient Medications   Medication Sig Dispense Refill    albuterol (VENTOLIN HFA) 90 mcg/actuation inhaler Inhale 2 puffs into the lungs every 4 (four) hours as needed for Wheezing or Shortness of Breath. Rescue 18 g 2    albuterol-ipratropium (DUO-NEB) 2.5 mg-0.5 mg/3 mL nebulizer solution Take 3 mLs by nebulization every 4 (four) hours. Rescue 75 mL 0    amLODIPine (NORVASC) 5 MG tablet Take 1 tablet (5 mg total) by mouth once daily. 90 tablet 3    cetirizine (ZYRTEC) 10 MG tablet Take 1 tablet (10 mg total) by mouth every evening. 90 tablet 3    ciprofloxacin HCl (CIPRO) 500 MG tablet Take 500 mg by mouth 2 (two) times daily.      fluticasone propionate (FLONASE) 50 mcg/actuation nasal spray 2 sprays by Each Nostril route 2 (two) times daily.      ibuprofen (ADVIL,MOTRIN) 200 MG tablet Take 200 mg by mouth every 6 (six) hours as needed for Pain.      ibuprofen (ADVIL,MOTRIN) 600 MG tablet Take 1 tablet (600 mg total) by mouth every 6 (six) hours as needed for Pain. 20 tablet 0    ketorolac (TORADOL) 10 mg tablet Take 10 mg by mouth every 6 (six) hours as needed.      mirabegron (MYRBETRIQ) 50 mg Tb24 Take 1 tablet (50 mg total) by mouth once daily. 90 tablet 3    ondansetron (ZOFRAN-ODT) 4 MG TbDL DISSOLVE 1 TABLET ON THE TONGUE EVERY 8 HOURS FOR 4 DAYS AS NEEDED FOR NAUSEA      rosuvastatin (CRESTOR) 20 MG tablet Take 1 tablet (20 mg total) by mouth once daily. 90 tablet 3    SPIRIVA WITH HANDIHALER 18 mcg inhalation capsule 1 capsule by inhaling the contents of the capsule using the HandiHaler device Inhalation Once a day for 30 days      sumatriptan (IMITREX) 50 MG tablet Take 0.5 tablets (25 mg total) by mouth daily as needed for Migraine. May repeat x 1 dose in 2 hours if ineffective. 9 tablet 1    SYMBICORT 160-4.5 mcg/actuation HFAA INHALE 2 PUFFS BY MOUTH EVERY 12 HOURS 11 g 0    topiramate (TOPAMAX) 25 MG tablet TAKE 1 TABLET BY MOUTH AT BEDTIME FOR 7 DAYS THEN  INCREASE  TO   TWICE  A  DAY 60 tablet 3     No current facility-administered medications for this visit.       Review of Systems:  General: No fever, chills, fatigability, or weight loss.  Skin: No rashes, itching, or changes in color or texture of skin.  Chest: Denies WILLIS, cyanosis, wheezing, cough, and sputum production.  Abdomen: Appetite fine. No weight loss. Denies diarrhea, abdominal pain, hematemesis, or blood in stool.  Musculoskeletal: No joint stiffness or swelling. Denies back pain.  : As above.  All other review of systems negative.    PMH:  Past Medical History:   Diagnosis Date    COPD (chronic obstructive pulmonary disease)     Hyperlipidemia     Sensorineural hearing loss (SNHL) of both ears        PSH:  Past Surgical History:   Procedure Laterality Date     SECTION      EAR RECONSTRUCTION Right 2012    EXCISION OF SOFT TISSUE N/A 6/10/2024    Procedure: EXCISION, SOFT TISSUE;  Surgeon: Jean Szymanski Jr., MD;  Location: [facility];  Service: General;  Laterality: N/A;  Upper Back    HYSTERECTOMY         FamHx:  Family History   Problem Relation Name Age of Onset    Hypertension Mother      Diabetes Mother      Cancer Mother      Heart disease Father      Cancer Sister Michelle     Cancer Sister      Liver disease Sister         SocHx:  Social History     Socioeconomic History    Marital status:    Tobacco Use    Smoking status: Former     Current packs/day: 0.00     Average packs/day: 1 pack/day for 43.0 years (43.0 ttl pk-yrs)     Types: Cigarettes     Quit date: 1/15/2024     Years since quittin.5     Passive exposure: Current    Smokeless tobacco: Never    Tobacco comments:     Has stopped for 6 months   Substance and Sexual Activity    Alcohol use: Not Currently     Alcohol/week: 2.0 standard drinks of alcohol     Types: 1 Glasses of wine, 1 Cans of beer per week     Comment: occasionally    Drug use: Not Currently     Frequency: 1.0 times per week     Types: Marijuana     Comment:  Sometimes at night  to sleep    Sexual activity: Yes     Partners: Male     Birth control/protection: None     Social Determinants of Health     Financial Resource Strain: Medium Risk (4/4/2024)    Overall Financial Resource Strain (CARDIA)     Difficulty of Paying Living Expenses: Somewhat hard   Food Insecurity: Food Insecurity Present (4/4/2024)    Hunger Vital Sign     Worried About Running Out of Food in the Last Year: Sometimes true     Ran Out of Food in the Last Year: Sometimes true   Transportation Needs: No Transportation Needs (4/4/2024)    PRAPARE - Transportation     Lack of Transportation (Medical): No     Lack of Transportation (Non-Medical): No   Physical Activity: Inactive (4/4/2024)    Exercise Vital Sign     Days of Exercise per Week: 0 days     Minutes of Exercise per Session: 0 min   Stress: No Stress Concern Present (4/4/2024)    [employer] [place] [employer] - Occupational Stress Questionnaire     Feeling of Stress : Not at all   Recent Concern: Stress - Stress Concern Present (1/18/2024)    [employer] [place] - Occupational Stress Questionnaire     Feeling of Stress : To some extent   Housing Stability: High Risk (4/4/2024)    Housing Stability Vital Sign     Unable to Pay for Housing in the Last Year: Yes     Number of Places Lived in the Last Year: 1     Unstable Housing in the Last Year: No       Physical Exam:  There were no vitals filed for this visit.      Impression:  Urinary urgency  Mixed stress urinary incontinence    Plan:  Instructed patient to continue Gemtesa 75 mg p.o. daily.  Instructed patient on timed voiding every 2-3 hrs, double voiding, avoidance of bladder irritants such as alcohol, citrus foods, chocolate, caffeinated drinks, energy drinks, spicy foods, sugar, caffeine products, sodas. Instructed patient to avoid drinking fluids 1-2 hours prior to bedtime & void immediately before bedtime.   RTC 3 months for re-evaluation.  Instructed  patient if develops any abnormal urologic symptoms notify clinic to be re-evaluate treated or during after hours go to emergency room versus urgent here.                           F

## 2024-08-22 DIAGNOSIS — R51.9 WORSENING HEADACHES: ICD-10-CM

## 2024-08-23 ENCOUNTER — LAB VISIT (OUTPATIENT)
Dept: LAB | Facility: HOSPITAL | Age: 54
End: 2024-08-23
Attending: NURSE PRACTITIONER
Payer: MEDICAID

## 2024-08-23 ENCOUNTER — TELEPHONE (OUTPATIENT)
Dept: INTERNAL MEDICINE | Facility: CLINIC | Age: 54
End: 2024-08-23
Payer: MEDICAID

## 2024-08-23 DIAGNOSIS — R80.9 PROTEINURIA, UNSPECIFIED TYPE: ICD-10-CM

## 2024-08-23 LAB
ALBUMIN SERPL-MCNC: 4.1 G/DL (ref 3.5–5)
ALBUMIN/GLOB SERPL: 1.2 RATIO (ref 1.1–2)
ALP SERPL-CCNC: 80 UNIT/L (ref 40–150)
ALT SERPL-CCNC: 44 UNIT/L (ref 0–55)
ANION GAP SERPL CALC-SCNC: 9 MEQ/L
AST SERPL-CCNC: 32 UNIT/L (ref 5–34)
BACTERIA #/AREA URNS AUTO: NORMAL /HPF
BILIRUB SERPL-MCNC: 0.7 MG/DL
BILIRUB UR QL STRIP.AUTO: NEGATIVE
BUN SERPL-MCNC: 13 MG/DL (ref 9.8–20.1)
C3 SERPL-MCNC: 169 MG/DL (ref 80–173)
C4 SERPL-MCNC: 34 MG/DL (ref 13–46)
CALCIUM SERPL-MCNC: 10.2 MG/DL (ref 8.4–10.2)
CHLORIDE SERPL-SCNC: 109 MMOL/L (ref 98–107)
CLARITY UR: CLEAR
CO2 SERPL-SCNC: 24 MMOL/L (ref 22–29)
COLOR UR AUTO: YELLOW
CREAT SERPL-MCNC: 0.84 MG/DL (ref 0.55–1.02)
CREAT UR-MCNC: 55.1 MG/DL (ref 45–106)
CREAT/UREA NIT SERPL: 15
GFR SERPLBLD CREATININE-BSD FMLA CKD-EPI: >60 ML/MIN/1.73/M2
GLOBULIN SER-MCNC: 3.4 GM/DL (ref 2.4–3.5)
GLUCOSE SERPL-MCNC: 131 MG/DL (ref 74–100)
GLUCOSE UR QL STRIP: 100
HGB UR QL STRIP: NEGATIVE
IGA SERPL-MCNC: <5 MG/DL (ref 65–421)
IGG SERPL-MCNC: 1002 MG/DL (ref 522–1631)
IGM SERPL-MCNC: 135 MG/DL (ref 33–293)
KETONES UR QL STRIP: NEGATIVE
LEUKOCYTE ESTERASE UR QL STRIP: NEGATIVE
NITRITE UR QL STRIP: NEGATIVE
PH UR STRIP: 5.5 [PH]
POTASSIUM SERPL-SCNC: 4.3 MMOL/L (ref 3.5–5.1)
PROT SERPL-MCNC: 7.5 GM/DL (ref 6.4–8.3)
PROT UR QL STRIP: 100
PROT UR STRIP-MCNC: 47.4 MG/DL
RBC #/AREA URNS AUTO: NORMAL /HPF
SODIUM SERPL-SCNC: 142 MMOL/L (ref 136–145)
SP GR UR STRIP.AUTO: 1.02 (ref 1–1.03)
SQUAMOUS #/AREA URNS AUTO: NORMAL /HPF
URINE PROTEIN/CREATININE RATIO (OLG): 0.9
UROBILINOGEN UR STRIP-ACNC: 0.2
WBC #/AREA URNS AUTO: NORMAL /HPF

## 2024-08-23 PROCEDURE — 84165 PROTEIN E-PHORESIS SERUM: CPT

## 2024-08-23 PROCEDURE — 80053 COMPREHEN METABOLIC PANEL: CPT

## 2024-08-23 PROCEDURE — 86160 COMPLEMENT ANTIGEN: CPT

## 2024-08-23 PROCEDURE — 84156 ASSAY OF PROTEIN URINE: CPT

## 2024-08-23 PROCEDURE — 86039 ANTINUCLEAR ANTIBODIES (ANA): CPT

## 2024-08-23 PROCEDURE — 82784 ASSAY IGA/IGD/IGG/IGM EACH: CPT

## 2024-08-23 PROCEDURE — 82570 ASSAY OF URINE CREATININE: CPT

## 2024-08-23 PROCEDURE — 86036 ANCA SCREEN EACH ANTIBODY: CPT

## 2024-08-23 PROCEDURE — 86334 IMMUNOFIX E-PHORESIS SERUM: CPT

## 2024-08-23 PROCEDURE — 36415 COLL VENOUS BLD VENIPUNCTURE: CPT

## 2024-08-23 PROCEDURE — 83521 IG LIGHT CHAINS FREE EACH: CPT

## 2024-08-23 PROCEDURE — 81003 URINALYSIS AUTO W/O SCOPE: CPT

## 2024-08-23 RX ORDER — TOPIRAMATE 50 MG/1
50 TABLET, FILM COATED ORAL DAILY
Qty: 90 TABLET | Refills: 0 | Status: SHIPPED | OUTPATIENT
Start: 2024-08-23 | End: 2024-11-21

## 2024-08-23 RX ORDER — TOPIRAMATE 50 MG/1
50 TABLET, FILM COATED ORAL DAILY
Qty: 90 TABLET | Refills: 0 | Status: SHIPPED | OUTPATIENT
Start: 2024-08-23 | End: 2024-08-23

## 2024-08-23 RX ORDER — TOPIRAMATE 50 MG/1
TABLET, FILM COATED ORAL
Qty: 90 TABLET | Refills: 0 | Status: SHIPPED | OUTPATIENT
Start: 2024-08-23 | End: 2024-08-23

## 2024-08-23 NOTE — TELEPHONE ENCOUNTER
----- Message from Teresa Garcia sent at 8/23/2024  1:37 PM CDT -----  Who Called: Glenda Huval Dance    Pharmacy is calling to request assistance with Rx    Pharmacy name and phone number: [facility] [address] [place] [address]   Phone: [phone]  Fax: [phone]  Pharmacy contact: chun  Patient Name: nola  Prescription Name: topiramate (TOPAMAX) 50 MG tablet 90 tablet   What do they need to clarify?: directions     Preferred Method of Contact: Phone Call  Patient's Preferred Phone Number on File: [phone]   Best Call Back Number, if different:  Additional Information: pharmacy call, requesting  a response ASAP, please advise  , Thanks

## 2024-08-23 NOTE — TELEPHONE ENCOUNTER
Returned call to Rick at pharmacy and spoke to Elmo and gave instructions of Sig - Route: Take 1 tablet (50 mg total) by mouth once daily. - Oral. Elmo verbalized understanding.

## 2024-08-23 NOTE — TELEPHONE ENCOUNTER
Spoke to pt . Pt stated she has been taking 2 of the 25 mg tablets of her Topamax.                 Gladys Hughes NP P Jordan Patience M Staff  Caller: Unspecified (Yesterday,  6:41 AM)  Can someone please contact this patient to ask what dosage she is taking, this message was forwarded back to me without any response to the previous question.  I just need to know if she is taking 50 mg at night or how she is taking this medication, she has been having a taper dose refilled for a few months now.    Gladys

## 2024-08-24 LAB
ANA PAT SER IF-IMP: ABNORMAL
ANA SER QL HEP2 SUBST: ABNORMAL
ANA TITR SER HEP2 SUBST: ABNORMAL {TITER}

## 2024-08-26 ENCOUNTER — OFFICE VISIT (OUTPATIENT)
Dept: NEPHROLOGY | Facility: CLINIC | Age: 54
End: 2024-08-26
Payer: MEDICAID

## 2024-08-26 VITALS
TEMPERATURE: 99 F | HEART RATE: 67 BPM | DIASTOLIC BLOOD PRESSURE: 82 MMHG | OXYGEN SATURATION: 96 % | SYSTOLIC BLOOD PRESSURE: 140 MMHG | BODY MASS INDEX: 32.7 KG/M2 | RESPIRATION RATE: 18 BRPM | WEIGHT: 177.69 LBS | HEIGHT: 62 IN

## 2024-08-26 DIAGNOSIS — R80.9 PROTEINURIA, UNSPECIFIED TYPE: Primary | ICD-10-CM

## 2024-08-26 DIAGNOSIS — I10 PRIMARY HYPERTENSION: ICD-10-CM

## 2024-08-26 DIAGNOSIS — R76.8 POSITIVE ANA (ANTINUCLEAR ANTIBODY): ICD-10-CM

## 2024-08-26 LAB
ALBUMIN % SPEP (OHS): 50.96 (ref 48.1–59.5)
ALBUMIN SERPL-MCNC: 3.7 G/DL (ref 3.5–5)
ALBUMIN/GLOB SERPL: 1.1 RATIO (ref 1.1–2)
ALPHA 1 GLOB (OHS): 0.26 GM/DL (ref 0–0.4)
ALPHA 1 GLOB% (OHS): 3.56 (ref 2.3–4.9)
ALPHA 2 GLOB % (OHS): 14.37 (ref 6.9–13)
ALPHA 2 GLOB (OHS): 1.03 GM/DL (ref 0.4–1)
BETA GLOB (OHS): 1.1 GM/DL (ref 0.7–1.3)
BETA GLOB% (OHS): 15.32 (ref 13.8–19.7)
GAMMA GLOBULIN % (OHS): 15.78 (ref 10.1–21.9)
GAMMA GLOBULIN (OHS): 1.14 GM/DL (ref 0.4–1.8)
GLOBULIN SER-MCNC: 3.5 GM/DL (ref 2.4–3.5)
KAPPA LC FREE SER NEPH-MCNC: 1.63 MG/DL (ref 0.33–1.94)
KAPPA LC FREE/LAMBDA FREE SER NEPH: 1.11 {RATIO} (ref 0.26–1.65)
LAMBDA LC FREE SER NEPH-MCNC: 1.47 MG/DL (ref 0.57–2.63)
M SPIKE % (OHS): 0
M SPIKE (OHS): 0 GM/DL
PATH REV: NORMAL
PROT SERPL-MCNC: 7.2 GM/DL (ref 6.4–8.3)

## 2024-08-26 PROCEDURE — 3066F NEPHROPATHY DOC TX: CPT | Mod: CPTII,,, | Performed by: NURSE PRACTITIONER

## 2024-08-26 PROCEDURE — 99215 OFFICE O/P EST HI 40 MIN: CPT | Mod: PBBFAC | Performed by: NURSE PRACTITIONER

## 2024-08-26 PROCEDURE — 1160F RVW MEDS BY RX/DR IN RCRD: CPT | Mod: CPTII,,, | Performed by: NURSE PRACTITIONER

## 2024-08-26 PROCEDURE — 3008F BODY MASS INDEX DOCD: CPT | Mod: CPTII,,, | Performed by: NURSE PRACTITIONER

## 2024-08-26 PROCEDURE — 1159F MED LIST DOCD IN RCRD: CPT | Mod: CPTII,,, | Performed by: NURSE PRACTITIONER

## 2024-08-26 PROCEDURE — 3079F DIAST BP 80-89 MM HG: CPT | Mod: CPTII,,, | Performed by: NURSE PRACTITIONER

## 2024-08-26 PROCEDURE — 3077F SYST BP >= 140 MM HG: CPT | Mod: CPTII,,, | Performed by: NURSE PRACTITIONER

## 2024-08-26 PROCEDURE — 99214 OFFICE O/P EST MOD 30 MIN: CPT | Mod: S$PBB,,, | Performed by: NURSE PRACTITIONER

## 2024-08-26 PROCEDURE — 3062F POS MACROALBUMINURIA REV: CPT | Mod: CPTII,,, | Performed by: NURSE PRACTITIONER

## 2024-08-26 PROCEDURE — 3044F HG A1C LEVEL LT 7.0%: CPT | Mod: CPTII,,, | Performed by: NURSE PRACTITIONER

## 2024-08-26 NOTE — PROGRESS NOTES
"[facility]  Nephrology Clinic Note    Chief complaint: Follow-up (RTC, did not take meds, w/o complaints)    History of present illness:   Glenda Huval Dance is a 54 y.o. White female with past medical history of persistent microscopic hematuria, nonnephrotic range proteinuria, COPD, hypertension, dyslipidemia, and sensorineural hearing loss since childhood. Patient presents for follow up appointment in nephrology clinic today, denies complaints.     Review of Systems  12 point review of systems conducted, negative except as stated in the history of present illness.    Allergies: Patient is allergic to penicillins.     Past Medical History:  has a past medical history of COPD (chronic obstructive pulmonary disease), Hyperlipidemia, and Sensorineural hearing loss (SNHL) of both ears.    Procedure History:  has a past surgical history that includes Hysterectomy; Ear reconstruction (Right, );  section (); and Excision of soft tissue (N/A, 6/10/2024).    Family History: family history includes Cancer in her mother, sister, and sister; Diabetes in her mother; Heart disease in her father; Hypertension in her mother; Liver disease in her sister.    Social History:  reports that she quit smoking about 7 months ago. Her smoking use included cigarettes. She has a 43 pack-year smoking history. She has been exposed to tobacco smoke. She has never used smokeless tobacco. She reports that she does not currently use alcohol after a past usage of about 2.0 standard drinks of alcohol per week. She reports that she does not currently use drugs after having used the following drugs: Marijuana. Frequency: 1.00 time per week.    Physical exam  BP (!) 140/82 (BP Location: Right arm, Patient Position: Sitting, BP Method: Medium (Manual))   Pulse 67   Temp 98.8 °F (37.1 °C) (Oral)   Resp 18   Ht 5' 1.81" (1.57 m)   Wt 80.6 kg (177 lb 11.1 oz)   SpO2 96%   BMI 32.70 kg/m²   General " appearance: Patient is in no acute distress.  Skin: No rashes or wounds.  HEENT: PERRLA, EOMI, no scleral icterus, no JVD. Neck is supple.  Chest: Respirations are unlabored. Lungs sounds are clear.   Heart: S1, S2.   Abdomen: Benign.  : Deferred.  Extremities: No edema, peripheral pulses are palpable.   Neuro: No focal deficits.     Home Medications:    Current Outpatient Medications:     albuterol (VENTOLIN HFA) 90 mcg/actuation inhaler, Inhale 2 puffs into the lungs every 4 (four) hours as needed for Wheezing or Shortness of Breath. Rescue, Disp: 18 g, Rfl: 2    albuterol-ipratropium (DUO-NEB) 2.5 mg-0.5 mg/3 mL nebulizer solution, Take 3 mLs by nebulization every 4 (four) hours. Rescue, Disp: 75 mL, Rfl: 0    amLODIPine (NORVASC) 5 MG tablet, Take 1 tablet (5 mg total) by mouth once daily., Disp: 90 tablet, Rfl: 3    cetirizine (ZYRTEC) 10 MG tablet, Take 1 tablet (10 mg total) by mouth every evening., Disp: 90 tablet, Rfl: 3    fluticasone propionate (FLONASE) 50 mcg/actuation nasal spray, 2 sprays by Each Nostril route 2 (two) times daily., Disp: , Rfl:     ketorolac (TORADOL) 10 mg tablet, Take 10 mg by mouth every 6 (six) hours as needed., Disp: , Rfl:     mirabegron (MYRBETRIQ) 50 mg Tb24, Take 1 tablet (50 mg total) by mouth once daily., Disp: 90 tablet, Rfl: 3    ondansetron (ZOFRAN-ODT) 4 MG TbDL, DISSOLVE 1 TABLET ON THE TONGUE EVERY 8 HOURS FOR 4 DAYS AS NEEDED FOR NAUSEA, Disp: , Rfl:     rosuvastatin (CRESTOR) 20 MG tablet, Take 1 tablet (20 mg total) by mouth once daily., Disp: 90 tablet, Rfl: 3    SPIRIVA WITH HANDIHALER 18 mcg inhalation capsule, 1 capsule by inhaling the contents of the capsule using the HandiHaler device Inhalation Once a day for 30 days, Disp: , Rfl:     sumatriptan (IMITREX) 50 MG tablet, Take 0.5 tablets (25 mg total) by mouth daily as needed for Migraine. May repeat x 1 dose in 2 hours if ineffective., Disp: 9 tablet, Rfl: 1    SYMBICORT 160-4.5 mcg/actuation HFAA, INHALE  2 PUFFS BY MOUTH EVERY 12 HOURS, Disp: 11 g, Rfl: 0    topiramate (TOPAMAX) 50 MG tablet, Take 1 tablet (50 mg total) by mouth once daily., Disp: 90 tablet, Rfl: 0    Laboratory data    Lab Results   Component Value Date    WBC 9.98 06/19/2024    HGB 13.7 06/19/2024    HCT 40.6 06/19/2024     06/19/2024     08/23/2024    K 4.3 08/23/2024    CO2 24 08/23/2024    BUN 13.0 08/23/2024    CREATININE 0.84 08/23/2024    EGFRNORACEVR >60 08/23/2024    GLUCOSE 131 (H) 08/23/2024    CALCIUM 10.2 08/23/2024    ALKPHOS 80 08/23/2024    LABPROT 7.5 08/23/2024    LABPROT 7.2 08/23/2024    ALBUMIN 4.1 08/23/2024    ALBUMIN 3.7 08/23/2024    BILIDIR <0.1 12/26/2018    IBILI unable to calc 12/26/2018    AST 32 08/23/2024    ALT 44 08/23/2024    MG 1.80 02/21/2024      Lab Results   Component Value Date    HGBA1C 5.7 03/07/2024    ANAHS Positive 1:160 (A) 08/23/2024    MSPIKEPCT 0 08/23/2024     Urine:  Lab Results   Component Value Date    APPEARANCEUA Clear 08/23/2024    SGUA 1.025 08/23/2024    PROTEINUA 100 (A) 08/23/2024    KETONESUA Negative 08/23/2024    LEUKOCYTESUR Negative 08/23/2024    RBCUA None Seen 08/23/2024    WBCUA 0-2 08/23/2024    BACTERIA Occasional 08/23/2024    SQEPUA Trace (A) 01/17/2024    HYALINECASTS None Seen 01/17/2024    CREATRANDUR 55.1 08/23/2024    PROTEINURINE 47.4 08/23/2024    UPROTCREA 0.9 08/23/2024         Imaging  US Retroperitoneal Limited 07/16/2024  Grayscale and color Doppler sonographic evaluation of the kidneys.  The right kidney measures 9-10 cm. The left kidney measures 9-10 cm.   No hydronephrosis.  Grossly normal renal parenchymal echogenicity.  Impression  No significant sonographic abnormality of the kidneys.  Electronically signed by: Garcia Whitney  Date:    07/16/2024  Time:   14:32    Impression    ICD-10-CM ICD-9-CM   1. Proteinuria, unspecified type  R80.9 791.0   2. Primary hypertension  I10 401.9   3. Positive DRAKE (antinuclear antibody)  R76.8 795.79   4. 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  Kidney ultrasound was unremarkable.  DRAKE was positive (speckled pattern, 1:160 titer), complement levels are WNL, SPEP negative for M spike. ANCA serologies are pending.    Will defer kidney biopsy for now since renal indices are stable and proteinuria is less than 1 g/day.  Patient does report diffuse joint pains that she had for years.  Will refer to rheumatology for further evaluation.      Primary hypertension  Blood pressure reading is at goal, continue current antihypertensive regimen and 2 g a day dietary sodium restriction.      Positive DRAKE (antinuclear antibody)  Complement levels are WNL.  Will defer kidney biopsy for now since renal indices are stable and proteinuria is less than 1 g/day.  Patient does report diffuse joint pains that she had for years.  Will refer to rheumatology for further evaluation.      BMI 32.0-32.9,adult  Lifestyle and dietary interventions discussed, patient encouraged to maintain non-sedentary lifestyle and well-balanced diet.       Follow up in about 4 months (around 12/26/2024).    8/26/2024  Noemy Souza NP  [facility] Nephrology

## 2024-08-27 LAB
GBM QUANT (OHS): <1.5 U/ML
MPO QUANT (OLG): 0.2 U/ML
PR3 QUANT (OHS): <0.6 U/ML

## 2024-09-17 ENCOUNTER — OFFICE VISIT (OUTPATIENT)
Dept: CARDIOLOGY | Facility: CLINIC | Age: 54
End: 2024-09-17
Payer: MEDICAID

## 2024-09-17 VITALS
DIASTOLIC BLOOD PRESSURE: 80 MMHG | SYSTOLIC BLOOD PRESSURE: 156 MMHG | RESPIRATION RATE: 20 BRPM | WEIGHT: 181.44 LBS | HEART RATE: 56 BPM | TEMPERATURE: 98 F | HEIGHT: 61 IN | OXYGEN SATURATION: 95 % | BODY MASS INDEX: 34.26 KG/M2

## 2024-09-17 DIAGNOSIS — I73.9 CLAUDICATION: ICD-10-CM

## 2024-09-17 DIAGNOSIS — E78.2 MIXED HYPERLIPIDEMIA: Primary | Chronic | ICD-10-CM

## 2024-09-17 PROCEDURE — 99215 OFFICE O/P EST HI 40 MIN: CPT | Mod: PBBFAC | Performed by: INTERNAL MEDICINE

## 2024-09-17 NOTE — PROGRESS NOTES
Cardiology clinic Note     CHIEF COMPLAINT:   Chief Complaint   Patient presents with    f/u denies chest pain or sob since LV no questions                   Review of patient's allergies indicates:   Allergen Reactions    Penicillins Anaphylaxis and Swelling                                          HPI:  Nola Valdivia Dance 54 y.o. female  with past medical history of persistent microscopic hematuria, nonnephrotic range proteinuria, COPD, hypertension, dyslipidemia, migrane and sensorineural hearing loss since childhood. Patient complained of leg pains in the previous visit. She was worked up for peripheral artery disease, which showed ADIS and Doppler USG of legs B/l - normal.   Today, Patient says that she is feeling well. She has denied any episodes of chest pain, palpitations. She reports that she does have mild pedal edema but goes away on elevation. Also has occasional shortness of breath on exertion, contributing it to COPD. Reports that her activity has been the same as the previous visit. She said that she did not take her medication yesterday night, but report compliance to her medications. She doesnot check her blood pressure at home, but does have a machine to check it. Reports that she quit smoking about 8 months ago. Patient reports that she walks on treadmill every morning, denies any pain in the legs during that time.                                                                                                                                                                                                                                                                                                                                                                                                                                                                                        CARDIAC TESTING:  Results for  orders placed during the hospital encounter of 05/27/24    Echo    Interpretation Summary    Left Ventricle: The left ventricle is normal in size. Normal wall thickness. There is normal systolic function with a visually estimated ejection fraction of 55 - 60%.    Right Ventricle: Normal right ventricular cavity size. Systolic function is normal.    Left Atrium: Left atrium is dilated.    Aortic Valve: The aortic valve is a trileaflet valve.    Mitral Valve: There is no stenosis. The mean pressure gradient across the mitral valve is 3 mmHg at a heart rate of  bpm.    Tricuspid Valve: There is mild regurgitation.    Results for orders placed during the hospital encounter of 05/16/24    Nuclear Stress - Cardiology Interpreted    Interpretation Summary    Normal myocardial perfusion scan. There is no evidence of myocardial ischemia or infarction.    The gated perfusion images showed an ejection fraction of 70% at rest. The gated perfusion images showed an ejection fraction of 76% post stress.    The patient reported no chest pain during the stress test.    There were no arrhythmias during stress.    The nuclear stress test is  fair quality.  On the nuclear stress test the EF is normal.  If the patient has an echo, the EF on the echo is considered more accurate.    The patient has a low risk nuclear stress test    Nuclear stress test indicates no ischemia.     CV Ultrasound doppler arterial legs bilat  6/25/24    Patent right lower extremity arterial system with no evidence of flow reduction or focal stenosis. Triphasic waveforms noted throughout.  Patent left lower extremity arterial system with no evidence of flow reduction or focal stenosis. Triphasic waveforms noted throughout.     CV Exercise ADIS 6/25/24    The right resting ADIS reduction is normal.   The right ankle [DT] artery has triphasic flow.   The right ankle [DP] artery has triphasic flow.   The left resting ADIS reduction is normal.   The left ankle [PT]  artery has triphasic flow.  The left ankle [DP] artery has triphasic flow.  Exercise Study: toe raises.    The exercise study was stopped due to protocol completion.       Immediately post exercise, the right ADIS is normal.  Immediately post exercise, the left ADIS is normal.         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    Cough    Chronic obstructive pulmonary disease    Class 1 obesity due to excess calories with serious comorbidity and body mass index (BMI) of 33.0 to 33.9 in adult    Tobacco abuse    Mixed hyperlipidemia    Proteinuria    IFG (impaired fasting glucose)    Claudication    Urinary urgency    DENNIS (stress urinary incontinence, female)     Past Surgical History:   Procedure Laterality Date     SECTION      EAR RECONSTRUCTION Right 2012    EXCISION OF SOFT TISSUE N/A 6/10/2024    Procedure: EXCISION, SOFT TISSUE;  Surgeon: Jean Szymanski Jr., MD;  Location: [facility];  Service: General;  Laterality: N/A;  Upper Back    HYSTERECTOMY       Social History     Socioeconomic History    Marital status:    Tobacco Use    Smoking status: Former     Current packs/day: 0.00     Average packs/day: 1 pack/day for 43.0 years (43.0 ttl pk-yrs)     Types: Cigarettes     Quit date: 1/15/2024     Years since quittin.6     Passive exposure: Current    Smokeless tobacco: Never    Tobacco comments:     Has stopped for 6 months   Substance and Sexual Activity    Alcohol use: Not Currently     Alcohol/week: 2.0 standard drinks of alcohol     Types: 1 Glasses of wine, 1 Cans of beer per week     Comment: occasionally    Drug use: Not Currently     Frequency: 1.0 times per week     Types: Marijuana     Comment: Sometimes at night  to sleep    Sexual activity: Yes     Partners: Male     Birth control/protection: None     Social Determinants of Health      Financial Resource Strain: Medium Risk (4/4/2024)    Overall Financial Resource Strain (CARDIA)     Difficulty of Paying Living Expenses: Somewhat hard   Food Insecurity: Food Insecurity Present (4/4/2024)    Hunger Vital Sign     Worried About Running Out of Food in the Last Year: Sometimes true     Ran Out of Food in the Last Year: Sometimes true   Transportation Needs: No Transportation Needs (4/4/2024)    PRAPARE - Transportation     Lack of Transportation (Medical): No     Lack of Transportation (Non-Medical): No   Physical Activity: Inactive (4/4/2024)    Exercise Vital Sign     Days of Exercise per Week: 0 days     Minutes of Exercise per Session: 0 min   Stress: No Stress Concern Present (4/4/2024)    [employer] - Occupational Stress Questionnaire     Feeling of Stress : Not at all   Recent Concern: Stress - Stress Concern Present (1/18/2024)    [employer] - Occupational Stress Questionnaire     Feeling of Stress : To some extent   Housing Stability: High Risk (4/4/2024)    Housing Stability Vital Sign     Unable to Pay for Housing in the Last Year: Yes     Number of Places Lived in the Last Year: 1     Unstable Housing in the Last Year: No        Family History   Problem Relation Name Age of Onset    Hypertension Mother      Diabetes Mother      Cancer Mother      Heart disease Father      Cancer Sister Michelle     Cancer Sister      Liver disease Sister           Current Outpatient Medications:     albuterol (VENTOLIN HFA) 90 mcg/actuation inhaler, Inhale 2 puffs into the lungs every 4 (four) hours as needed for Wheezing or Shortness of Breath. Rescue, Disp: 18 g, Rfl: 2    albuterol-ipratropium (DUO-NEB) 2.5 mg-0.5 mg/3 mL nebulizer solution, Take 3 mLs by nebulization every 4 (four) hours. Rescue, Disp: 75 mL, Rfl: 0    amLODIPine (NORVASC) 5 MG tablet, Take 1 tablet (5 mg total) by mouth once daily., Disp: 90 tablet, Rfl: 3    cetirizine  (ZYRTEC) 10 MG tablet, Take 1 tablet (10 mg total) by mouth every evening., Disp: 90 tablet, Rfl: 3    fluticasone propionate (FLONASE) 50 mcg/actuation nasal spray, 2 sprays by Each Nostril route 2 (two) times daily., Disp: , Rfl:     ketorolac (TORADOL) 10 mg tablet, Take 10 mg by mouth every 6 (six) hours as needed., Disp: , Rfl:     mirabegron (MYRBETRIQ) 50 mg Tb24, Take 1 tablet (50 mg total) by mouth once daily., Disp: 90 tablet, Rfl: 3    rosuvastatin (CRESTOR) 20 MG tablet, Take 1 tablet (20 mg total) by mouth once daily., Disp: 90 tablet, Rfl: 3    SPIRIVA WITH HANDIHALER 18 mcg inhalation capsule, 1 capsule by inhaling the contents of the capsule using the HandiHaler device Inhalation Once a day for 30 days, Disp: , Rfl:     sumatriptan (IMITREX) 50 MG tablet, Take 0.5 tablets (25 mg total) by mouth daily as needed for Migraine. May repeat x 1 dose in 2 hours if ineffective., Disp: 9 tablet, Rfl: 1    SYMBICORT 160-4.5 mcg/actuation HFAA, INHALE 2 PUFFS BY MOUTH EVERY 12 HOURS, Disp: 11 g, Rfl: 0    ondansetron (ZOFRAN-ODT) 4 MG TbDL, DISSOLVE 1 TABLET ON THE TONGUE EVERY 8 HOURS FOR 4 DAYS AS NEEDED FOR NAUSEA (Patient not taking: Reported on 9/17/2024), Disp: , Rfl:     topiramate (TOPAMAX) 50 MG tablet, Take 1 tablet (50 mg total) by mouth once daily., Disp: 90 tablet, Rfl: 0     ROS:                                                                                                                                                                             General: Denies fever, no night sweats, chills, fatigue, changes in weight.  Skin: Denies rashes, bruises, petechia  HEENT: Denies headache, head injury, no acute vision changes.  CVS: Denies chest pain, palpitations,  PND, + orthopnea, +Pedal edema  Resp: Denies cough, wheezing, + sob  GI: Denies dysphagia, melena, hematochezia, abdominal pain, nausea, vomiting, constipation.  : Denies dysuria, hematuria, polyuria, CVA pain,  "nocturia  Musculoskeletal: Denies joint stiffness, pain, swelling or weakness  Neuro: Denies headaches, syncope, seizures, numbness, tingling, vertigo, dizziness      Vitals:  Blood pressure (!) 150/88, pulse (!) 56, temperature 98.1 °F (36.7 °C), temperature source Oral, resp. rate 20, height 5' 1" (1.549 m), weight 82.3 kg (181 lb 7 oz), SpO2 95%.       PE:    General: Awake, alert, & oriented to person, place & time. No acute distress  Psychiatric: Mood and affect normal  HEENT: Normocephalic, atraumatic. Face symmetric.  Cardiovascular: Regular rate & rhythm, Normal S1 & S2 w/out murmurs, rubs or gallops, No JVD appreciated  Pulmonary: Bilateral symmetric chest rise, Non-labored, no wheezes, rhonchi or crackles are appreciated  Abdominal:  Soft, nontender, and nondistended  Extremities: No clubbing, cyanosis or edema.  Skin:  Exposed skin is warm & dry.  Neuro:   Patient moves all extremities equally. Sensation intact bilateraly.       ASSESSMENT/PLAN:      Hypertension  -BP today 150/88.    - ON Amlodipine 5mg daily  - Counseled to maintain a blood pressure log and and call with results in 2 weeks  - Counseled patient on low sodium diet and weight loss    HLD   - Last LDL is over goal at 194 - 6 months ago   - On Crestor 20mg     Claudication -resolved  - ADIS/Art US BLE - Normal   - Denies any symptoms today      RTC 1 year    Sarah Pires MD  Internal Medicine - PGY-1      "

## 2024-09-26 ENCOUNTER — TELEPHONE (OUTPATIENT)
Dept: INTERNAL MEDICINE | Facility: CLINIC | Age: 54
End: 2024-09-26
Payer: MEDICAID

## 2024-09-26 NOTE — TELEPHONE ENCOUNTER
Spoke with pt she stated that she has a BP monitor at home and has been logging her readings she was unable to give the last reading b/c she was at work. Pt has f/u with pcp on 10/2/24

## 2024-10-01 NOTE — PROGRESS NOTES
Candace Lassiter, FLORESITA   [facility]  [address] [facility] [address]      PATIENT NAME: Glenda Huval Dance  : 1970  DATE: 10/2/24  MRN: [id number]      Patient PCP Information       None on File            Reason for Visit / Chief Complaint: Follow-up       History of Present Illness / Problem Focused Workflow     Glenda Huval Dance presents to the clinic with Follow-up     54 y.o. White female presents to the clinic. Medical problems include mixed conductive and SNHL bilaterally, lipoma of neck, tobacco abuse (quit 2024-40 pk yr), CAP and obesity. Followed by [facility] neurology, audiology, cardiology, rheumatology, urology and ENT clinics.     10/1/24  Pt presents for HTN follow up. Reports med compliance, refilled appropriately. Reports she rarely requires albuterol inhaler. Denies acute complaints. RTC in 3 months for wellness, may do labs with renal labs in Dec.             Review of Systems     Review of Systems   Constitutional:  Negative for fatigue, fever and unexpected weight change.   HENT:  Negative for ear pain, hearing loss, trouble swallowing and voice change.    Respiratory:  Positive for cough. Negative for shortness of breath.    Cardiovascular:  Negative for chest pain and palpitations.   Gastrointestinal:  Negative for abdominal pain, diarrhea and vomiting.   Genitourinary:  Negative for dysuria.   Musculoskeletal:  Negative for gait problem.   Skin:  Negative for rash and wound.   Neurological:  Negative for weakness.   Psychiatric/Behavioral:  Negative for suicidal ideas.          Medications and Allergies     Medications  Current Outpatient Medications   Medication Instructions    albuterol (VENTOLIN HFA) 90 mcg/actuation inhaler 2 puffs, Inhalation, Every 4 hours PRN, Rescue    albuterol-ipratropium (DUO-NEB) 2.5 mg-0.5 mg/3 mL nebulizer solution 3 mLs, Nebulization, Every 4 hours, Rescue    amLODIPine (NORVASC) 5 mg,  "Oral, Daily    cetirizine (ZYRTEC) 10 mg, Oral, Nightly    fluticasone propionate (FLONASE) 50 mcg/actuation nasal spray 2 sprays, 2 times daily    ketorolac (TORADOL) 10 mg, Every 6 hours PRN    mirabegron (MYRBETRIQ) 50 mg, Oral, Daily    ondansetron (ZOFRAN-ODT) 4 MG TbDL DISSOLVE 1 TABLET ON THE TONGUE EVERY 8 HOURS FOR 4 DAYS AS NEEDED FOR NAUSEA    rosuvastatin (CRESTOR) 20 mg, Oral, Daily    SPIRIVA WITH HANDIHALER 18 mcg inhalation capsule 1 capsule by inhaling the contents of the capsule using the HandiHaler device Inhalation Once a day for 30 days    sumatriptan (IMITREX) 25 mg, Oral, Daily PRN, May repeat x 1 dose in 2 hours if ineffective.    SYMBICORT 160-4.5 mcg/actuation HFAA 2 puffs, Inhalation, Every 12 hours    topiramate (TOPAMAX) 50 mg, Oral, Daily         Allergies  Review of patient's allergies indicates:   Allergen Reactions    Penicillins Anaphylaxis and Swelling       Physical Examination     Visit Vitals  /72 (BP Location: Left arm, Patient Position: Sitting)   Pulse 69   Temp 98.9 °F (37.2 °C) (Oral)   Resp 16   Ht 5' 1" (1.549 m)   Wt 81.6 kg (179 lb 14.4 oz)   SpO2 97%   BMI 33.99 kg/m²       Physical Exam  Vitals and nursing note reviewed.   Constitutional:       General: She is not in acute distress.     Appearance: She is not ill-appearing.   HENT:      Head: Normocephalic and atraumatic.      Mouth/Throat:      Mouth: Mucous membranes are moist.      Pharynx: Oropharynx is clear.   Eyes:      General: No scleral icterus.     Extraocular Movements: Extraocular movements intact.      Conjunctiva/sclera: Conjunctivae normal.      Pupils: Pupils are equal, round, and reactive to light.   Neck:      Vascular: No carotid bruit.   Cardiovascular:      Rate and Rhythm: Normal rate and regular rhythm.      Heart sounds: No murmur heard.     No friction rub. No gallop.   Pulmonary:      Effort: Pulmonary effort is normal. No respiratory distress.      Breath sounds: Normal breath sounds. " No wheezing, rhonchi or rales.   Abdominal:      General: Abdomen is flat. Bowel sounds are normal. There is no distension.      Palpations: Abdomen is soft. There is no mass.      Tenderness: There is no abdominal tenderness.   Musculoskeletal:         General: Normal range of motion.      Cervical back: Normal range of motion and neck supple.   Skin:     General: Skin is warm and dry.   Neurological:      General: No focal deficit present.      Mental Status: She is alert.   Psychiatric:         Mood and Affect: Mood normal.           Results     Lab Results   Component Value Date    WBC 9.98 06/19/2024    RBC 4.22 06/19/2024    HGB 13.7 06/19/2024    HCT 40.6 06/19/2024    MCV 96.2 (H) 06/19/2024    MCH 32.5 (H) 06/19/2024    MCHC 33.7 06/19/2024    RDW 12.3 06/19/2024     06/19/2024    MPV 9.5 06/19/2024     CMP  Sodium   Date Value Ref Range Status   08/23/2024 142 136 - 145 mmol/L Final     Potassium   Date Value Ref Range Status   08/23/2024 4.3 3.5 - 5.1 mmol/L Final     Chloride   Date Value Ref Range Status   08/23/2024 109 (H) 98 - 107 mmol/L Final     CO2   Date Value Ref Range Status   08/23/2024 24 22 - 29 mmol/L Final     Blood Urea Nitrogen   Date Value Ref Range Status   08/23/2024 13.0 9.8 - 20.1 mg/dL Final     Creatinine   Date Value Ref Range Status   08/23/2024 0.84 0.55 - 1.02 mg/dL Final     Calcium   Date Value Ref Range Status   08/23/2024 10.2 8.4 - 10.2 mg/dL Final     Albumin   Date Value Ref Range Status   08/23/2024 4.1 3.5 - 5.0 g/dL Final   08/23/2024 3.7 3.5 - 5.0 g/dL Final     Bilirubin Total   Date Value Ref Range Status   08/23/2024 0.7 <=1.5 mg/dL Final     ALP   Date Value Ref Range Status   08/23/2024 80 40 - 150 unit/L Final     AST   Date Value Ref Range Status   08/23/2024 32 5 - 34 unit/L Final     ALT   Date Value Ref Range Status   08/23/2024 44 0 - 55 unit/L Final     Estimated GFR-Non    Date Value Ref Range Status   12/26/2018 70 (L) >>=90  mL/min Final     Lab Results   Component Value Date    CHOL 277 (H) 03/07/2024     Lab Results   Component Value Date    HDL 43 03/07/2024     Lab Results   Component Value Date    TRIG 202 (H) 03/07/2024     Lab Results   Component Value Date    VLDL 40 03/07/2024     Lab Results   Component Value Date    .00 (H) 03/07/2024     Lab Results   Component Value Date    TSH 1.220 03/07/2024         Assessment        ICD-10-CM ICD-9-CM   1. Well adult exam  Z00.00 V70.0   2. Mixed stress and urge urinary incontinence  N39.46 788.33   3. Pulmonary emphysema, unspecified emphysema type  J43.9 492.8   4. Need for hepatitis C screening test  Z11.59 V73.89   5. Screening for HIV (human immunodeficiency virus)  Z11.4 V73.89   6. Influenza vaccine needed  Z23 V04.81   7. Migraine without status migrainosus, not intractable, unspecified migraine type  G43.909 346.90   8. Primary hypertension  I10 401.9        Plan      Problem List Items Addressed This Visit          Neuro    Migraine without status migrainosus, not intractable    Relevant Medications    topiramate (TOPAMAX) 50 MG tablet       Pulmonary    Chronic obstructive pulmonary disease (Chronic)    Current Assessment & Plan     Continue spiriva and symbacort for maintenance, albuterol rescue  Use inhalers as prescribed (rinse mouth after use of steroid inhalers). Use long term inhalers daily and rescue inhaler as needed.  Avoid triggers (high humidity, strong odors, chemical fumes).  Report signs of upper respiratory infection immediately for early treatment.  Flu shot recommended yearly.  Practice abdominal breathing. Eat smaller, more frequent meals.         Relevant Medications    SYMBICORT 160-4.5 mcg/actuation HFAA       Cardiac/Vascular    Primary hypertension    Current Assessment & Plan     Continue amlodipine  At goal.  Low Sodium Diet (DASH Diet - Less than 2 grams of sodium per day).  Monitor blood pressure daily and log. Report consistent numbers  greater than 140/90.  Maintain healthy weight with goal BMI <30. Exercise 30 minutes per day, 5 days per week.              Renal/    Mixed stress and urge urinary incontinence    Relevant Medications    mirabegron (MYRBETRIQ) 50 mg Tb24     Other Visit Diagnoses       Well adult exam    -  Primary    Relevant Orders    Hemoglobin A1C    Lipid Panel    TSH    T4, Free    Vitamin D    Need for hepatitis C screening test        Relevant Orders    Hepatitis C Antibody    Screening for HIV (human immunodeficiency virus)        Relevant Orders    HIV 1/2 Ag/Ab (4th Gen)    Influenza vaccine needed        Relevant Medications    influenza (Flulaval, Fluzone, Fluarix) 45 mcg/0.5 mL IM vaccine (> or = 6 mo) 0.5 mL (Completed) (Start on 10/2/2024  9:00 AM)            Future Appointments   Date Time Provider Department Center   11/4/2024  9:00 AM Corky Cuba NP [facility] UROLO [place]   11/26/2024  1:15 PM Rajani Fajardo MD [facility] NEURO [place]   12/31/2024 10:30 AM Noemy Souza [facility] NEPHR [place] [facility]   3/14/2025  7:00 AM [facility] [id number] SCR1 [facility] ABHI [place] [facility]   5/7/2025  9:30 AM Madhavi Kuhn [facility] ENT [place] [facility]   5/26/2025  1:30 PM Rae Hardy MICHAEL [facility] GYN [place]   9/18/2025  2:00 PM Corky Rodriguez MD [facility] CARD [place]        Follow up in about 3 months (around 1/2/2025).      Signature:      [facility]  [address] [facility] [address]    Date of encounter: 10/2/24  54 y.o. White female presents to the clinic for lab, PFT and CT results. PMH mixed conductive and SNHL bilaterally, lipoma of neck, tobacco abuse (quit 1/2024-40 pk yr), CAP and obesity. Followed by [facility] audiology and ENT clinics.

## 2024-10-02 ENCOUNTER — OFFICE VISIT (OUTPATIENT)
Dept: INTERNAL MEDICINE | Facility: CLINIC | Age: 54
End: 2024-10-02
Payer: MEDICAID

## 2024-10-02 VITALS
RESPIRATION RATE: 16 BRPM | SYSTOLIC BLOOD PRESSURE: 129 MMHG | WEIGHT: 179.88 LBS | DIASTOLIC BLOOD PRESSURE: 72 MMHG | TEMPERATURE: 99 F | OXYGEN SATURATION: 97 % | HEIGHT: 61 IN | BODY MASS INDEX: 33.96 KG/M2 | HEART RATE: 69 BPM

## 2024-10-02 DIAGNOSIS — Z11.59 NEED FOR HEPATITIS C SCREENING TEST: ICD-10-CM

## 2024-10-02 DIAGNOSIS — I10 PRIMARY HYPERTENSION: ICD-10-CM

## 2024-10-02 DIAGNOSIS — J43.9 PULMONARY EMPHYSEMA, UNSPECIFIED EMPHYSEMA TYPE: ICD-10-CM

## 2024-10-02 DIAGNOSIS — N39.46 MIXED STRESS AND URGE URINARY INCONTINENCE: ICD-10-CM

## 2024-10-02 DIAGNOSIS — G43.909 MIGRAINE WITHOUT STATUS MIGRAINOSUS, NOT INTRACTABLE, UNSPECIFIED MIGRAINE TYPE: ICD-10-CM

## 2024-10-02 DIAGNOSIS — Z00.00 WELL ADULT EXAM: Primary | ICD-10-CM

## 2024-10-02 DIAGNOSIS — Z23 INFLUENZA VACCINE NEEDED: ICD-10-CM

## 2024-10-02 DIAGNOSIS — Z11.4 SCREENING FOR HIV (HUMAN IMMUNODEFICIENCY VIRUS): ICD-10-CM

## 2024-10-02 PROCEDURE — 99214 OFFICE O/P EST MOD 30 MIN: CPT | Mod: PBBFAC

## 2024-10-02 PROCEDURE — 90471 IMMUNIZATION ADMIN: CPT | Mod: PBBFAC

## 2024-10-02 PROCEDURE — 90656 IIV3 VACC NO PRSV 0.5 ML IM: CPT | Mod: PBBFAC

## 2024-10-02 RX ORDER — MIRABEGRON 50 MG/1
50 TABLET, EXTENDED RELEASE ORAL DAILY
Qty: 90 TABLET | Refills: 2 | Status: SHIPPED | OUTPATIENT
Start: 2024-10-02 | End: 2025-06-29

## 2024-10-02 RX ORDER — BUDESONIDE AND FORMOTEROL FUMARATE DIHYDRATE 160; 4.5 UG/1; UG/1
2 AEROSOL RESPIRATORY (INHALATION) EVERY 12 HOURS
Qty: 11 G | Refills: 3 | Status: SHIPPED | OUTPATIENT
Start: 2024-10-02

## 2024-10-02 RX ORDER — TOPIRAMATE 50 MG/1
50 TABLET, FILM COATED ORAL DAILY
Qty: 90 TABLET | Refills: 2 | Status: SHIPPED | OUTPATIENT
Start: 2024-10-02 | End: 2025-06-29

## 2024-10-02 RX ADMIN — INFLUENZA VIRUS VACCINE 0.5 ML: 15; 15; 15 SUSPENSION INTRAMUSCULAR at 08:10

## 2024-10-02 NOTE — ASSESSMENT & PLAN NOTE
Continue spiriva and symbacort for maintenance, albuterol rescue  Use inhalers as prescribed (rinse mouth after use of steroid inhalers). Use long term inhalers daily and rescue inhaler as needed.  Avoid triggers (high humidity, strong odors, chemical fumes).  Report signs of upper respiratory infection immediately for early treatment.  Flu shot recommended yearly.  Practice abdominal breathing. Eat smaller, more frequent meals.

## 2024-10-02 NOTE — ASSESSMENT & PLAN NOTE
Continue amlodipine  At goal.  Low Sodium Diet (DASH Diet - Less than 2 grams of sodium per day).  Monitor blood pressure daily and log. Report consistent numbers greater than 140/90.  Maintain healthy weight with goal BMI <30. Exercise 30 minutes per day, 5 days per week.

## 2024-11-04 ENCOUNTER — OFFICE VISIT (OUTPATIENT)
Dept: UROLOGY | Facility: CLINIC | Age: 54
End: 2024-11-04
Payer: MEDICAID

## 2024-11-04 VITALS
HEART RATE: 57 BPM | WEIGHT: 186.81 LBS | RESPIRATION RATE: 20 BRPM | SYSTOLIC BLOOD PRESSURE: 128 MMHG | HEIGHT: 61 IN | OXYGEN SATURATION: 95 % | TEMPERATURE: 98 F | BODY MASS INDEX: 35.27 KG/M2 | DIASTOLIC BLOOD PRESSURE: 71 MMHG

## 2024-11-04 DIAGNOSIS — N39.46 MIXED STRESS AND URGE URINARY INCONTINENCE: Primary | ICD-10-CM

## 2024-11-04 LAB
BILIRUB SERPL-MCNC: NEGATIVE MG/DL
BLOOD URINE, POC: NEGATIVE
COLOR, POC UA: YELLOW
GLUCOSE UR QL STRIP: NEGATIVE
KETONES UR QL STRIP: NEGATIVE
LEUKOCYTE ESTERASE URINE, POC: NEGATIVE
NITRITE, POC UA: NEGATIVE
PH, POC UA: 5.5
POC RESIDUAL URINE VOLUME: 0 ML (ref 0–100)
PROTEIN, POC: NORMAL
SPECIFIC GRAVITY, POC UA: 1.01
UROBILINOGEN, POC UA: 0.2

## 2024-11-04 PROCEDURE — 1159F MED LIST DOCD IN RCRD: CPT | Mod: CPTII,,, | Performed by: NURSE PRACTITIONER

## 2024-11-04 PROCEDURE — 1160F RVW MEDS BY RX/DR IN RCRD: CPT | Mod: CPTII,,, | Performed by: NURSE PRACTITIONER

## 2024-11-04 PROCEDURE — 3062F POS MACROALBUMINURIA REV: CPT | Mod: CPTII,,, | Performed by: NURSE PRACTITIONER

## 2024-11-04 PROCEDURE — 3008F BODY MASS INDEX DOCD: CPT | Mod: CPTII,,, | Performed by: NURSE PRACTITIONER

## 2024-11-04 PROCEDURE — 3066F NEPHROPATHY DOC TX: CPT | Mod: CPTII,,, | Performed by: NURSE PRACTITIONER

## 2024-11-04 PROCEDURE — 3044F HG A1C LEVEL LT 7.0%: CPT | Mod: CPTII,,, | Performed by: NURSE PRACTITIONER

## 2024-11-04 PROCEDURE — 3078F DIAST BP <80 MM HG: CPT | Mod: CPTII,,, | Performed by: NURSE PRACTITIONER

## 2024-11-04 PROCEDURE — 99214 OFFICE O/P EST MOD 30 MIN: CPT | Mod: PBBFAC | Performed by: NURSE PRACTITIONER

## 2024-11-04 PROCEDURE — 3074F SYST BP LT 130 MM HG: CPT | Mod: CPTII,,, | Performed by: NURSE PRACTITIONER

## 2024-11-04 PROCEDURE — 99213 OFFICE O/P EST LOW 20 MIN: CPT | Mod: S$PBB,,, | Performed by: NURSE PRACTITIONER

## 2024-11-04 PROCEDURE — 81001 URINALYSIS AUTO W/SCOPE: CPT | Mod: PBBFAC | Performed by: NURSE PRACTITIONER

## 2024-11-04 PROCEDURE — 51798 US URINE CAPACITY MEASURE: CPT | Mod: PBBFAC | Performed by: NURSE PRACTITIONER

## 2024-11-04 NOTE — PROGRESS NOTES
Chief Complaint: No chief complaint on file.      HPI:   Patient is a 54-year-old female here on this three-month follow-up mixed urinary stress incontinence.  Patient seen by PCP on 05/22/2024 with complaints of mixed urinary stress incontinence for past several years.  Patient in the past was switched from Myrbetriq 50 mg p.o. daily to Gemtesa 75 mg p.o. daily due to failed outcomes.  Patient's last visit states her urinary frequency proximally 8-10 times during the day and 2 times at night with Myrbetriq, however with Gemtesa symptoms of urinary frequency and urgency during the day or down to 4-5 and at night 1-2 times.  Today patient presents much improved symptoms of urinary frequency down to 5 times a day and 1 time at night with Gemtesa 75 mg p.o. daily.  Bladder scan 0 mL.      Allergies:  Review of patient's allergies indicates:   Allergen Reactions    Penicillins Anaphylaxis and Swelling       Medications:  Current Outpatient Medications   Medication Sig Dispense Refill    albuterol (VENTOLIN HFA) 90 mcg/actuation inhaler Inhale 2 puffs into the lungs every 4 (four) hours as needed for Wheezing or Shortness of Breath. Rescue 18 g 2    albuterol-ipratropium (DUO-NEB) 2.5 mg-0.5 mg/3 mL nebulizer solution Take 3 mLs by nebulization every 4 (four) hours. Rescue 75 mL 0    amLODIPine (NORVASC) 5 MG tablet Take 1 tablet (5 mg total) by mouth once daily. 90 tablet 3    cetirizine (ZYRTEC) 10 MG tablet Take 1 tablet (10 mg total) by mouth every evening. 90 tablet 3    fluticasone propionate (FLONASE) 50 mcg/actuation nasal spray 2 sprays by Each Nostril route 2 (two) times daily.      ketorolac (TORADOL) 10 mg tablet Take 10 mg by mouth every 6 (six) hours as needed.      mirabegron (MYRBETRIQ) 50 mg Tb24 Take 1 tablet (50 mg total) by mouth once daily. 90 tablet 2    ondansetron (ZOFRAN-ODT) 4 MG TbDL DISSOLVE 1 TABLET ON THE TONGUE EVERY 8 HOURS FOR 4 DAYS AS NEEDED FOR NAUSEA (Patient not taking: Reported on  10/2/2024)      rosuvastatin (CRESTOR) 20 MG tablet Take 1 tablet (20 mg total) by mouth once daily. 90 tablet 3    SPIRIVA WITH HANDIHALER 18 mcg inhalation capsule 1 capsule by inhaling the contents of the capsule using the HandiHaler device Inhalation Once a day for 30 days      sumatriptan (IMITREX) 50 MG tablet Take 0.5 tablets (25 mg total) by mouth daily as needed for Migraine. May repeat x 1 dose in 2 hours if ineffective. 9 tablet 1    SYMBICORT 160-4.5 mcg/actuation HFAA Inhale 2 puffs into the lungs every 12 (twelve) hours. 11 g 3    topiramate (TOPAMAX) 50 MG tablet Take 1 tablet (50 mg total) by mouth once daily. 90 tablet 2     No current facility-administered medications for this visit.       Review of Systems:  General: No fever, chills, fatigability, or weight loss.  Skin: No rashes, itching, or changes in color or texture of skin.  Chest: Denies WILLIS, cyanosis, wheezing, cough, and sputum production.  Abdomen: Appetite fine. No weight loss. Denies diarrhea, abdominal pain, hematemesis, or blood in stool.  Musculoskeletal: No joint stiffness or swelling. Denies back pain.  : As above.  All other review of systems negative.    PMH:  Past Medical History:   Diagnosis Date    COPD (chronic obstructive pulmonary disease)     Hyperlipidemia     Sensorineural hearing loss (SNHL) of both ears        PSH:  Past Surgical History:   Procedure Laterality Date     SECTION      EAR RECONSTRUCTION Right 2012    EXCISION OF SOFT TISSUE N/A 6/10/2024    Procedure: EXCISION, SOFT TISSUE;  Surgeon: Jean Szymanski Jr., MD;  Location[facility];  Service: General;  Laterality: N/A;  Upper Back    HYSTERECTOMY         FamHx:  Family History   Problem Relation Name Age of Onset    Hypertension Mother      Diabetes Mother      Cancer Mother      Heart disease Father      Cancer Sister Michelle     Cancer Sister      Liver disease Sister         SocHx:  Social History     Socioeconomic History    Marital  status:    Tobacco Use    Smoking status: Former     Current packs/day: 0.00     Average packs/day: 1 pack/day for 43.0 years (43.0 ttl pk-yrs)     Types: Cigarettes     Quit date: 1/15/2024     Years since quittin.8     Passive exposure: Current    Smokeless tobacco: Never    Tobacco comments:     Has stopped for 6 months   Substance and Sexual Activity    Alcohol use: Not Currently     Alcohol/week: 2.0 standard drinks of alcohol     Types: 1 Glasses of wine, 1 Cans of beer per week     Comment: occasionally    Drug use: Not Currently     Frequency: 1.0 times per week     Types: Marijuana     Comment: Sometimes at night  to sleep    Sexual activity: Yes     Partners: Male     Birth control/protection: None     Social Drivers of Health     Financial Resource Strain: Medium Risk (2024)    Overall Financial Resource Strain (CARDIA)     Difficulty of Paying Living Expenses: Somewhat hard   Food Insecurity: Food Insecurity Present (2024)    Hunger Vital Sign     Worried About Running Out of Food in the Last Year: Sometimes true     Ran Out of Food in the Last Year: Sometimes true   Transportation Needs: No Transportation Needs (2024)    PRAPARE - Transportation     Lack of Transportation (Medical): No     Lack of Transportation (Non-Medical): No   Physical Activity: Inactive (2024)    Exercise Vital Sign     Days of Exercise per Week: 0 days     Minutes of Exercise per Session: 0 min   Stress: No Stress Concern Present (2024)    [employer] - Occupational Stress Questionnaire     Feeling of Stress : Not at all   Recent Concern: Stress - Stress Concern Present (2024)    [employer] - Occupational Stress Questionnaire     Feeling of Stress : To some extent   Housing Stability: High Risk (2024)    Housing Stability Vital Sign     Unable to Pay for Housing in the Last Year: Yes     Number of Places Lived in the Last Year: 1      Unstable Housing in the Last Year: No       Physical Exam:  There were no vitals filed for this visit.  General: A&Ox3, no apparent distress, no deformities  Neck: No masses, normal thyroid  Lungs: CTA murtaza, no use of accessory muscles  Heart: RRR, no arrhythmias  Abdomen: Soft, NT, ND, no masses, no hernias, no hepatosplenomegaly  Lymphatic: Neck and groin nodes negative  Skin: The skin is warm and dry. No jaundice.  Ext: No c/c/e.        Urinalysis:  Component 09:45   Color, UA Yellow   Spec Grav UA 1.010   pH, UA 5.5   WBC, UA Negative   Nitrite, UA Negative   Protein, POC Trace   Glucose, UA Negative   Ketones, UA Negative   Urobilinogen, UA 0.2   Bilirubin, POC Negative   Blood, UA Negative           Microscopic urinalysis negative for RBCs, negative WBCs, negative nitrites.      Impression:  1. Mixed stress and urge urinary incontinence  - POCT URINE DIPSTICK WITH MICROSCOPE, AUTOMATED  - POCT Bladder Scan      Plan:  Instructed patient to continue Gemtesa 75 mg p.o. daily.  Instructed patient on timed voiding every 2-3 hrs, double voiding, avoidance of bladder irritants such as alcohol, citrus foods, chocolate, caffeinated drinks, energy drinks, spicy foods, sugar, caffeine products, sodas. Instructed patient to avoid drinking fluids 1-2 hours prior to bedtime & void immediately before bedtime.   RTC 6 months for re-evaluation.  Instructed patient if develops any abnormal urologic symptoms notify clinic to be re-evaluate treated or during after hours go to emergency room versus urgent here.                           F

## 2024-11-04 NOTE — PROGRESS NOTES
Placed in room. Seen by Winston Cuba NP. Spoke with patient. Bladder scan at  0 ml. RTC in 6 months.

## 2024-11-22 NOTE — PROGRESS NOTES
[facility] Neurology Initial Office Visit Note    Initial Visit Date: 11/26/2024  Current Visit Date:  11/26/2024    Chief Complaint:     Chief Complaint   Patient presents with    Dizziness     Patient state when she is driving she feels like she is turning when she really isn't. She states she has be feeling unbalanced.        History of Present Illness:      This is 54 y.o. female with history of proteinuria, tobacco use,COPD, b/l superior canal dehiscence, who is referred for headache disorder and chronic dizziness.     Age of Onset : 16 years old     Headache Description: right retro-orbital, stabbing, lasting hours, remit for a few hours,without rhinorrhea, with OD lacrimation, would wake her up in the middle of the night    Frequency: clusters of 10 - 20 times per day for few months then remits for a few months for many years.     Risk Factors  - Family history of headache disorder: No  - History of focal CNS lesions: No  - History of CNS infections: No  - History head trauma: No  - History of underlying mood disorder: No  - History of sleep disorder: No  - Recreational drug use: No  - Tobacco use: Yes quit 1 year ago   - Alcohol use: No  - Weight fluctuation: No  - Isotretinoin or Tetracycline use:  Not Applicable  - Family planning and contraceptive use: Not Applicable    Medications:     Current Prophylactic  Topiramate 50 mg daily: effective. Only had 1 cluster since initiation.     Current Abortive  Sumatriptan 50 mg twice a day as needed: ineffective  BC powder: ineffective.     Prior Prophylactic  Denied     Prior Abortive  Denied    Devices:     - VNS:  - TNS  - TMS:     Procedures:     - Botox:  - PSG block:   - Occipital nerve block:     Labs:     Results for orders placed or performed in visit on 11/04/24   POCT URINE DIPSTICK WITH MICROSCOPE, AUTOMATED    Collection Time: 11/04/24  9:45 AM   Result Value Ref Range    Color, UA Yellow     Spec Grav UA 1.010     pH, UA 5.5     WBC, UA Negative      Nitrite, UA Negative     Protein, POC Trace     Glucose, UA Negative     Ketones, UA Negative     Urobilinogen, UA 0.2     Bilirubin, POC Negative     Blood, UA Negative    POCT Bladder Scan    Collection Time: 11/04/24 10:06 AM   Result Value Ref Range    POC Residual Urine Volume 0 0 - 100 mL       Studies:     - MRI Brain IAC +/- Galo 4/2/2024:  I have reviewed the study independently and with the patient. Unremarkable.     - VNG 4/24/2024:  This patient's videonystagmogram was abnormal.  Oculomotor testing was within normal limits. Positional testing revealed vertical (up beat) nystagmus for all test conditions with vision denied. Positioning testing was negative for canalith involvement, bilaterally.  The caloric irrigation test revealed a 72% right ear caloric weakness, however non-mobile tympanograms noted.  Patient also has an extensive past surgical history to possibly include SSC occlusion and repair of right middle fossa encephalocele (as reported in 2018).  Todays findings indicative of a right, compensated vestibular dysfunction as well as possible central nervous system involvement.     Review of Systems:     Review of Systems   All other systems reviewed and are negative.      Physical Exams:     Vitals:    11/26/24 1305   BP: 124/76   Pulse: 68   Resp: 16   Temp: 98.4 °F (36.9 °C)       Physical Exam  Vitals and nursing note reviewed.   Constitutional:       Appearance: Normal appearance.   HENT:      Head: Normocephalic and atraumatic.      Nose: Nose normal.      Mouth/Throat:      Mouth: Mucous membranes are moist.      Pharynx: Oropharynx is clear.   Eyes:      Conjunctiva/sclera: Conjunctivae normal.   Cardiovascular:      Rate and Rhythm: Normal rate and regular rhythm.      Pulses: Normal pulses.   Pulmonary:      Effort: Pulmonary effort is normal.      Breath sounds: Normal breath sounds.   Abdominal:      General: Abdomen is flat.   Musculoskeletal:         General: Normal range of motion.       Cervical back: Normal range of motion.   Skin:     General: Skin is warm.   Neurological:      Mental Status: She is alert.         Comprehensive Neurological Exam:  Mental Status: Alert Oriented to Self, Date, and Place. Comprehension wnl. No dysarthria.   CN II - XII: VY, No APD, VFFC, No ptosis OU, EOMI without nystagmus LT/Temp symmetric in CN V1-3 distribution, Hearing grossly intact, Face Symmetric, Tongue and Uvula midline, Trapezius symmetric bilateral.   Motor: tone and bulk wnl throughout, no abnormal involuntary or voluntary movements, 5/5 to confrontation, Fine finger movements wnl b/l, No pronator drift.   Sensory: LT, Proprioception, Vibration, PP, Temp symmetric.   Reflexes: 2+ throughout  Cerebellar: FNF wnl b/l, RAHM wnl b/l  Romberg: Negative  Gait: normal.    Fukuda stepping test towards the left.     Assessment:     This is 54 y.o. female with history of proteinuria, tobacco use, COPD, b/l superior canal dehiscence,  who is referred for cluster headache.    Problem List Items Addressed This Visit          Neuro    Episodic cluster headache, not intractable - Primary    Relevant Medications    sumatriptan (IMITREX STATDOSE) 6 mg/0.5 mL kit    galcanezumab-gnlm 300 mg/3 mL (100 mg/mL x 3) Syrg    predniSONE (DELTASONE) 20 MG tablet     Other Visit Diagnoses       Dizziness                Plan:     [] continue with Topiramate 50 mg daily  [] discontinue sumatriptan tablet   [] start Sumatriptan 6 mg q2h prn subcutaneous injection as needed at onset of cluster headache   [] start Galcanezumab 300 mg once as needed at onset of cluster headache. High flow nasal oxygen is contraindicated in patient with COPD due to potential for decompensation of respiratory drive.   [] start Prednisone 20 mg daily for 5 days if cluster headache does not respond to subcutaneous sumatriptan and galcanezumab.     RTC 3-4 Months     Headache education provided: good sleep hygiene and 7 hours of sleep per night,  stress management, medication overuse education provided. Using more 3 OTC per week may worsen headaches, high intensity interval training has shown to reduce headache frequency. Low carb, high protein has shown to reduce headache frequency. Patient is instructed in keep headache diary.     I have explained the treatment plan, diagnosis, and prognosis to patient. All questions are answered to the best of my knowledge.     Visit today is associated with current or anticipated ongoing medical care related to this patient's single serious condition/complex condition as documented above.     Face to face time 60 minutes, including documentation, chart review, counseling, education, review of test results, relevant medical records, and coordination of care.       Rajani Fajardo MD   General Neurology  11/26/2024

## 2024-11-26 ENCOUNTER — OFFICE VISIT (OUTPATIENT)
Dept: NEUROLOGY | Facility: CLINIC | Age: 54
End: 2024-11-26
Payer: MEDICAID

## 2024-11-26 VITALS
HEART RATE: 68 BPM | TEMPERATURE: 98 F | DIASTOLIC BLOOD PRESSURE: 76 MMHG | SYSTOLIC BLOOD PRESSURE: 124 MMHG | OXYGEN SATURATION: 96 % | BODY MASS INDEX: 36.36 KG/M2 | HEIGHT: 60 IN | WEIGHT: 185.19 LBS | RESPIRATION RATE: 16 BRPM

## 2024-11-26 DIAGNOSIS — G44.019 EPISODIC CLUSTER HEADACHE, NOT INTRACTABLE: Primary | ICD-10-CM

## 2024-11-26 PROCEDURE — 3008F BODY MASS INDEX DOCD: CPT | Mod: CPTII,,, | Performed by: PSYCHIATRY & NEUROLOGY

## 2024-11-26 PROCEDURE — 3044F HG A1C LEVEL LT 7.0%: CPT | Mod: CPTII,,, | Performed by: PSYCHIATRY & NEUROLOGY

## 2024-11-26 PROCEDURE — G2211 COMPLEX E/M VISIT ADD ON: HCPCS | Mod: S$PBB,,, | Performed by: PSYCHIATRY & NEUROLOGY

## 2024-11-26 PROCEDURE — 3062F POS MACROALBUMINURIA REV: CPT | Mod: CPTII,,, | Performed by: PSYCHIATRY & NEUROLOGY

## 2024-11-26 PROCEDURE — 3078F DIAST BP <80 MM HG: CPT | Mod: CPTII,,, | Performed by: PSYCHIATRY & NEUROLOGY

## 2024-11-26 PROCEDURE — 99205 OFFICE O/P NEW HI 60 MIN: CPT | Mod: S$PBB,,, | Performed by: PSYCHIATRY & NEUROLOGY

## 2024-11-26 PROCEDURE — 1159F MED LIST DOCD IN RCRD: CPT | Mod: CPTII,,, | Performed by: PSYCHIATRY & NEUROLOGY

## 2024-11-26 PROCEDURE — 3074F SYST BP LT 130 MM HG: CPT | Mod: CPTII,,, | Performed by: PSYCHIATRY & NEUROLOGY

## 2024-11-26 PROCEDURE — 99214 OFFICE O/P EST MOD 30 MIN: CPT | Mod: PBBFAC | Performed by: PSYCHIATRY & NEUROLOGY

## 2024-11-26 PROCEDURE — 3066F NEPHROPATHY DOC TX: CPT | Mod: CPTII,,, | Performed by: PSYCHIATRY & NEUROLOGY

## 2024-11-26 RX ORDER — PREDNISONE 20 MG/1
20 TABLET ORAL DAILY PRN
Qty: 5 TABLET | Refills: 0 | Status: SHIPPED | OUTPATIENT
Start: 2024-11-26 | End: 2024-12-01

## 2024-11-26 RX ORDER — GALCANEZUMAB 100 MG/ML
300 INJECTION, SOLUTION SUBCUTANEOUS
Qty: 3 ML | Refills: 5 | Status: SHIPPED | OUTPATIENT
Start: 2024-11-26

## 2024-11-26 RX ORDER — CEFUROXIME AXETIL 250 MG/1
6 TABLET ORAL
Qty: 5 ML | Refills: 6 | Status: SHIPPED | OUTPATIENT
Start: 2024-11-26 | End: 2024-12-26

## 2024-11-27 ENCOUNTER — PATIENT MESSAGE (OUTPATIENT)
Dept: NEUROLOGY | Facility: CLINIC | Age: 54
End: 2024-11-27
Payer: MEDICAID

## 2024-12-30 ENCOUNTER — LAB VISIT (OUTPATIENT)
Dept: LAB | Facility: HOSPITAL | Age: 54
End: 2024-12-30
Attending: NURSE PRACTITIONER
Payer: MEDICAID

## 2024-12-30 DIAGNOSIS — R80.9 PROTEINURIA, UNSPECIFIED TYPE: ICD-10-CM

## 2024-12-30 LAB
ALBUMIN SERPL-MCNC: 3.7 G/DL (ref 3.5–5)
ALBUMIN/GLOB SERPL: 1.1 RATIO (ref 1.1–2)
ALP SERPL-CCNC: 85 UNIT/L (ref 40–150)
ALT SERPL-CCNC: 58 UNIT/L (ref 0–55)
ANION GAP SERPL CALC-SCNC: 8 MEQ/L
AST SERPL-CCNC: 48 UNIT/L (ref 5–34)
BACTERIA #/AREA URNS AUTO: ABNORMAL /HPF
BASOPHILS # BLD AUTO: 0.03 X10(3)/MCL
BASOPHILS NFR BLD AUTO: 0.3 %
BILIRUB SERPL-MCNC: 0.7 MG/DL
BILIRUB UR QL STRIP.AUTO: NEGATIVE
BUN SERPL-MCNC: 9.3 MG/DL (ref 9.8–20.1)
CALCIUM SERPL-MCNC: 9.5 MG/DL (ref 8.4–10.2)
CHLORIDE SERPL-SCNC: 105 MMOL/L (ref 98–107)
CLARITY UR: CLEAR
CO2 SERPL-SCNC: 24 MMOL/L (ref 22–29)
COLOR UR AUTO: ABNORMAL
CREAT SERPL-MCNC: 0.71 MG/DL (ref 0.55–1.02)
CREAT UR-MCNC: 80.4 MG/DL (ref 45–106)
CREAT/UREA NIT SERPL: 13
EOSINOPHIL # BLD AUTO: 0.2 X10(3)/MCL (ref 0–0.9)
EOSINOPHIL NFR BLD AUTO: 1.8 %
ERYTHROCYTE [DISTWIDTH] IN BLOOD BY AUTOMATED COUNT: 12.1 % (ref 11.5–17)
GFR SERPLBLD CREATININE-BSD FMLA CKD-EPI: >60 ML/MIN/1.73/M2
GLOBULIN SER-MCNC: 3.4 GM/DL (ref 2.4–3.5)
GLUCOSE SERPL-MCNC: 197 MG/DL (ref 74–100)
GLUCOSE UR QL STRIP: NEGATIVE
HCT VFR BLD AUTO: 42.5 % (ref 37–47)
HGB BLD-MCNC: 14.5 G/DL (ref 12–16)
HGB UR QL STRIP: ABNORMAL
IMM GRANULOCYTES # BLD AUTO: 0.03 X10(3)/MCL (ref 0–0.04)
IMM GRANULOCYTES NFR BLD AUTO: 0.3 %
KETONES UR QL STRIP: NEGATIVE
LEUKOCYTE ESTERASE UR QL STRIP: NEGATIVE
LYMPHOCYTES # BLD AUTO: 2.87 X10(3)/MCL (ref 0.6–4.6)
LYMPHOCYTES NFR BLD AUTO: 25.4 %
MCH RBC QN AUTO: 31.5 PG (ref 27–31)
MCHC RBC AUTO-ENTMCNC: 34.1 G/DL (ref 33–36)
MCV RBC AUTO: 92.4 FL (ref 80–94)
MONOCYTES # BLD AUTO: 0.89 X10(3)/MCL (ref 0.1–1.3)
MONOCYTES NFR BLD AUTO: 7.9 %
NEUTROPHILS # BLD AUTO: 7.27 X10(3)/MCL (ref 2.1–9.2)
NEUTROPHILS NFR BLD AUTO: 64.3 %
NITRITE UR QL STRIP: NEGATIVE
PH UR STRIP: 6 [PH]
PLATELET # BLD AUTO: 277 X10(3)/MCL (ref 130–400)
PMV BLD AUTO: 9.6 FL (ref 7.4–10.4)
POTASSIUM SERPL-SCNC: 4.2 MMOL/L (ref 3.5–5.1)
PROT SERPL-MCNC: 7.1 GM/DL (ref 6.4–8.3)
PROT UR QL STRIP: >=300
PROT UR STRIP-MCNC: 196.2 MG/DL
RBC # BLD AUTO: 4.6 X10(6)/MCL (ref 4.2–5.4)
RBC #/AREA URNS AUTO: ABNORMAL /HPF
SODIUM SERPL-SCNC: 137 MMOL/L (ref 136–145)
SP GR UR STRIP.AUTO: 1.01 (ref 1–1.03)
SQUAMOUS #/AREA URNS AUTO: ABNORMAL /HPF
URINE PROTEIN/CREATININE RATIO (OLG): 2.4
UROBILINOGEN UR STRIP-ACNC: 0.2
WBC # BLD AUTO: 11.29 X10(3)/MCL (ref 4.5–11.5)
WBC #/AREA URNS AUTO: ABNORMAL /HPF

## 2024-12-30 PROCEDURE — 36415 COLL VENOUS BLD VENIPUNCTURE: CPT

## 2024-12-30 PROCEDURE — 81003 URINALYSIS AUTO W/O SCOPE: CPT

## 2024-12-30 PROCEDURE — 80053 COMPREHEN METABOLIC PANEL: CPT

## 2024-12-30 PROCEDURE — 84156 ASSAY OF PROTEIN URINE: CPT

## 2024-12-30 PROCEDURE — 85025 COMPLETE CBC W/AUTO DIFF WBC: CPT

## 2024-12-31 ENCOUNTER — TELEPHONE (OUTPATIENT)
Dept: NEUROLOGY | Facility: CLINIC | Age: 54
End: 2024-12-31
Payer: MEDICAID

## 2024-12-31 ENCOUNTER — OFFICE VISIT (OUTPATIENT)
Dept: NEPHROLOGY | Facility: CLINIC | Age: 54
End: 2024-12-31
Payer: MEDICAID

## 2024-12-31 VITALS
BODY MASS INDEX: 36.25 KG/M2 | RESPIRATION RATE: 18 BRPM | TEMPERATURE: 98 F | WEIGHT: 184.63 LBS | OXYGEN SATURATION: 94 % | DIASTOLIC BLOOD PRESSURE: 76 MMHG | HEIGHT: 60 IN | SYSTOLIC BLOOD PRESSURE: 116 MMHG | HEART RATE: 80 BPM

## 2024-12-31 DIAGNOSIS — E66.01 SEVERE OBESITY (BMI 35.0-39.9) WITH COMORBIDITY: ICD-10-CM

## 2024-12-31 DIAGNOSIS — G44.019 EPISODIC CLUSTER HEADACHE, NOT INTRACTABLE: ICD-10-CM

## 2024-12-31 DIAGNOSIS — R80.9 PROTEINURIA, UNSPECIFIED TYPE: Primary | ICD-10-CM

## 2024-12-31 DIAGNOSIS — R76.8 POSITIVE ANA (ANTINUCLEAR ANTIBODY): ICD-10-CM

## 2024-12-31 DIAGNOSIS — I10 PRIMARY HYPERTENSION: ICD-10-CM

## 2024-12-31 PROCEDURE — 1160F RVW MEDS BY RX/DR IN RCRD: CPT | Mod: CPTII,,, | Performed by: NURSE PRACTITIONER

## 2024-12-31 PROCEDURE — 3074F SYST BP LT 130 MM HG: CPT | Mod: CPTII,,, | Performed by: NURSE PRACTITIONER

## 2024-12-31 PROCEDURE — 99214 OFFICE O/P EST MOD 30 MIN: CPT | Mod: S$PBB,,, | Performed by: NURSE PRACTITIONER

## 2024-12-31 PROCEDURE — 3008F BODY MASS INDEX DOCD: CPT | Mod: CPTII,,, | Performed by: NURSE PRACTITIONER

## 2024-12-31 PROCEDURE — 99215 OFFICE O/P EST HI 40 MIN: CPT | Mod: PBBFAC | Performed by: NURSE PRACTITIONER

## 2024-12-31 PROCEDURE — 3051F HG A1C>EQUAL 7.0%<8.0%: CPT | Mod: CPTII,,, | Performed by: NURSE PRACTITIONER

## 2024-12-31 PROCEDURE — 1159F MED LIST DOCD IN RCRD: CPT | Mod: CPTII,,, | Performed by: NURSE PRACTITIONER

## 2024-12-31 PROCEDURE — 3078F DIAST BP <80 MM HG: CPT | Mod: CPTII,,, | Performed by: NURSE PRACTITIONER

## 2024-12-31 RX ORDER — GALCANEZUMAB 100 MG/ML
300 INJECTION, SOLUTION SUBCUTANEOUS
Qty: 3 ML | Refills: 0 | Status: SHIPPED | OUTPATIENT
Start: 2024-12-31

## 2024-12-31 RX ORDER — ACETAMINOPHEN 500 MG
500 TABLET ORAL EVERY 6 HOURS PRN
COMMUNITY

## 2024-12-31 NOTE — TELEPHONE ENCOUNTER
Patient states she does not remember which injection she took for her headaches. She stated that when she took it she had the shakes. she has not been taking anything in fear that she will have another reaction. She is requesting alternatives.

## 2025-01-02 ENCOUNTER — LAB VISIT (OUTPATIENT)
Dept: LAB | Facility: HOSPITAL | Age: 55
End: 2025-01-02

## 2025-01-02 DIAGNOSIS — Z00.00 WELL ADULT EXAM: ICD-10-CM

## 2025-01-02 DIAGNOSIS — Z11.59 NEED FOR HEPATITIS C SCREENING TEST: ICD-10-CM

## 2025-01-02 DIAGNOSIS — Z11.4 SCREENING FOR HIV (HUMAN IMMUNODEFICIENCY VIRUS): ICD-10-CM

## 2025-01-02 LAB
25(OH)D3+25(OH)D2 SERPL-MCNC: 40 NG/ML (ref 30–80)
CHOLEST SERPL-MCNC: 124 MG/DL
CHOLEST/HDLC SERPL: 4 {RATIO} (ref 0–5)
EST. AVERAGE GLUCOSE BLD GHB EST-MCNC: 162.8 MG/DL
HBA1C MFR BLD: 7.3 %
HCV AB SERPL QL IA: NONREACTIVE
HDLC SERPL-MCNC: 32 MG/DL (ref 35–60)
HIV 1+2 AB+HIV1 P24 AG SERPL QL IA: NONREACTIVE
LDLC SERPL CALC-MCNC: 57 MG/DL (ref 50–140)
T4 FREE SERPL-MCNC: 1.09 NG/DL (ref 0.7–1.48)
TRIGL SERPL-MCNC: 173 MG/DL (ref 37–140)
TSH SERPL-ACNC: 2.68 UIU/ML (ref 0.35–4.94)
VLDLC SERPL CALC-MCNC: 35 MG/DL

## 2025-01-02 PROCEDURE — 36415 COLL VENOUS BLD VENIPUNCTURE: CPT

## 2025-01-02 PROCEDURE — 87389 HIV-1 AG W/HIV-1&-2 AB AG IA: CPT

## 2025-01-02 PROCEDURE — 84443 ASSAY THYROID STIM HORMONE: CPT

## 2025-01-02 PROCEDURE — 86803 HEPATITIS C AB TEST: CPT

## 2025-01-02 PROCEDURE — 83036 HEMOGLOBIN GLYCOSYLATED A1C: CPT

## 2025-01-02 PROCEDURE — 82306 VITAMIN D 25 HYDROXY: CPT

## 2025-01-02 PROCEDURE — 84439 ASSAY OF FREE THYROXINE: CPT

## 2025-01-02 PROCEDURE — 80061 LIPID PANEL: CPT

## 2025-01-02 NOTE — PROGRESS NOTES
"[facility]  Nephrology H&P    Chief complaint: Follow-up (RTC, took meds, congested, min cough)    History of present illness:   Glenda Huval Dance is a 54 y.o. White female with past medical history of persistent microscopic hematuria, nonnephrotic range proteinuria, COPD, hypertension, dyslipidemia, and sensorineural hearing loss since childhood. Patient presents for follow up appointment in nephrology clinic today.   Continues to complain of frequent headaches.      Review of Systems  12 point review of systems conducted, negative except as stated in the history of present illness.    Allergies: Patient is allergic to penicillins.     Past Medical History:  has a past medical history of COPD (chronic obstructive pulmonary disease), Hyperlipidemia, and Sensorineural hearing loss (SNHL) of both ears.    Procedure History:  has a past surgical history that includes Hysterectomy; Ear reconstruction (Right, );  section (); and Excision of soft tissue (N/A, 6/10/2024).    Family History: family history includes Cancer in her mother, sister, and sister; Diabetes in her mother; Heart disease in her father; Hypertension in her mother; Liver disease in her sister.    Social History:  reports that she quit smoking about a year ago. Her smoking use included cigarettes. She has a 43 pack-year smoking history. She has been exposed to tobacco smoke. She has never used smokeless tobacco. She reports that she does not currently use alcohol after a past usage of about 2.0 standard drinks of alcohol per week. She reports that she does not currently use drugs after having used the following drugs: Marijuana. Frequency: 1.00 time per week.    Physical exam  /76 (BP Location: Right arm, Patient Position: Sitting)   Pulse 80   Temp 98.1 °F (36.7 °C) (Oral)   Resp 18   Ht 4' 11.84" (1.52 m)   Wt 83.7 kg (184 lb 9.6 oz)   SpO2 (!) 94%   BMI 36.24 kg/m²   General appearance: " Patient is in no acute distress.  Skin: No rashes or wounds.  HEENT: PERRLA, EOMI, no scleral icterus, no JVD. Neck is supple.  Chest: Respirations are unlabored. Lungs sounds are clear.   Heart: S1, S2.   Abdomen: Benign.  : Deferred.  Extremities: No edema, peripheral pulses are palpable.   Neuro: No focal deficits.     Home Medications:    Current Outpatient Medications:     acetaminophen (TYLENOL) 500 MG tablet, Take 500 mg by mouth every 6 (six) hours as needed for Pain., Disp: , Rfl:     albuterol (VENTOLIN HFA) 90 mcg/actuation inhaler, Inhale 2 puffs into the lungs every 4 (four) hours as needed for Wheezing or Shortness of Breath. Rescue, Disp: 18 g, Rfl: 2    albuterol-ipratropium (DUO-NEB) 2.5 mg-0.5 mg/3 mL nebulizer solution, Take 3 mLs by nebulization every 4 (four) hours. Rescue, Disp: 75 mL, Rfl: 0    amLODIPine (NORVASC) 5 MG tablet, Take 1 tablet (5 mg total) by mouth once daily., Disp: 90 tablet, Rfl: 3    cetirizine (ZYRTEC) 10 MG tablet, Take 1 tablet (10 mg total) by mouth every evening., Disp: 90 tablet, Rfl: 3    fluticasone propionate (FLONASE) 50 mcg/actuation nasal spray, 2 sprays by Each Nostril route 2 (two) times daily., Disp: , Rfl:     rosuvastatin (CRESTOR) 20 MG tablet, Take 1 tablet (20 mg total) by mouth once daily., Disp: 90 tablet, Rfl: 3    SPIRIVA WITH HANDIHALER 18 mcg inhalation capsule, 1 capsule by inhaling the contents of the capsule using the HandiHaler device Inhalation Once a day for 30 days, Disp: , Rfl:     SYMBICORT 160-4.5 mcg/actuation HFAA, Inhale 2 puffs into the lungs every 12 (twelve) hours., Disp: 11 g, Rfl: 3    galcanezumab-gnlm 300 mg/3 mL (100 mg/mL x 3) Syrg, Inject 3 mLs (300 mg total) into the skin as needed (at onset of cluster headache)., Disp: 3 mL, Rfl: 0    mirabegron (MYRBETRIQ) 50 mg Tb24, Take 1 tablet (50 mg total) by mouth once daily. (Patient not taking: Reported on 12/31/2024), Disp: 90 tablet, Rfl: 2    ondansetron (ZOFRAN-ODT) 4 MG  TbDL, , Disp: , Rfl:     sumatriptan (IMITREX STATDOSE) 6 mg/0.5 mL kit, Inject 0.5 mLs (6 mg total) into the skin every 2 (two) hours as needed for Migraine. (Patient not taking: Reported on 12/31/2024), Disp: 5 mL, Rfl: 6    topiramate (TOPAMAX) 50 MG tablet, Take 1 tablet (50 mg total) by mouth once daily. (Patient not taking: Reported on 12/31/2024), Disp: 90 tablet, Rfl: 2    Laboratory data    Lab Results   Component Value Date    WBC 11.29 12/30/2024    HGB 14.5 12/30/2024    HCT 42.5 12/30/2024     12/30/2024     12/30/2024    K 4.2 12/30/2024    CO2 24 12/30/2024    BUN 9.3 (L) 12/30/2024    CREATININE 0.71 12/30/2024    EGFRNORACEVR >60 12/30/2024    GLUCOSE 197 (H) 12/30/2024    CALCIUM 9.5 12/30/2024    ALKPHOS 85 12/30/2024    LABPROT 7.1 12/30/2024    ALBUMIN 3.7 12/30/2024    BILIDIR <0.1 12/26/2018    IBILI unable to calc 12/26/2018    AST 48 (H) 12/30/2024    ALT 58 (H) 12/30/2024    MG 1.80 02/21/2024      Lab Results   Component Value Date    HGBA1C 7.3 (H) 01/02/2025    AMRZMZNK62PR 40 01/02/2025    ANAHS Positive 1:160 (A) 08/23/2024    HIV Nonreactive 01/02/2025    HEPCAB Nonreactive 01/02/2025    MSPIKEPCT 0 08/23/2024     Urine:  Lab Results   Component Value Date    APPEARANCEUA Clear 12/30/2024    SGUA 1.015 12/30/2024    PROTEINUA >=300 (A) 12/30/2024    KETONESUA Negative 12/30/2024    LEUKOCYTESUR Negative 12/30/2024    RBCUA 0-2 12/30/2024    WBCUA None Seen 12/30/2024    BACTERIA None Seen 12/30/2024    SQEPUA Trace (A) 01/17/2024    HYALINECASTS None Seen 01/17/2024    CREATRANDUR 80.4 12/30/2024    PROTEINURINE 196.2 12/30/2024    UPROTCREA 2.4 12/30/2024         Imaging  US Retroperitoneal Limited 07/16/2024  Grayscale and color Doppler sonographic evaluation of the kidneys.  The right kidney measures 9-10 cm. The left kidney measures 9-10 cm.   No hydronephrosis.  Grossly normal renal parenchymal echogenicity.    Impression  No significant sonographic abnormality of  the kidneys.  Electronically signed by: Garcia Whitney  Date:    07/16/2024  Time:    14:32      Impression    ICD-10-CM ICD-9-CM   1. Proteinuria, unspecified type  R80.9 791.0   2. Primary hypertension  I10 401.9   3. Positive DRAKE (antinuclear antibody)  R76.8 795.79   4. 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  Kidney ultrasound was unremarkable.  DRAKE was positive (speckled pattern, 1:160 titer), complement levels are WNL, SPEP negative for M spike.  MPO, PR3, and anti GBM antibody titers were below cutoff.    Urinary protein excretion has increased, patient would benefit from a kidney biopsy to establish exact etiology of proteinuria and to guide further treatment.  Will refer her to Interventional Radiology.     Primary hypertension  Blood pressure reading is at goal, continue current antihypertensive regimen and 2 g a day dietary sodium restriction.      Positive DRAKE (antinuclear antibody)  Urinary protein excretion has increased, patient would benefit from a kidney biopsy to establish exact etiology of proteinuria and to guide further treatment.  Will refer her to Interventional Radiology.     Severe obesity (BMI 35.0-39.9) with comorbidity  Lifestyle and dietary interventions discussed, patient encouraged to maintain non-sedentary lifestyle and well-balanced diet.       Follow up in about 1 month (around 1/31/2025).     Orders Placed This Encounter   Procedures    Comprehensive Metabolic Panel     Standing Status:   Future     Standing Expiration Date:   2/15/2025    Urinalysis, Reflex to Urine Culture     Standing Status:   Future     Standing Expiration Date:   2/15/2025     Order Specific Question:   Specimen Source     Answer:   Urine    Protein/Creatinine Ratio, Urine     Standing Status:   Future     Standing Expiration Date:   2/15/2025     Order Specific Question:    Specimen Source     Answer:   Urine    Ambulatory referral/consult to Interventional Radiology     Standing Status:   Future     Standing Expiration Date:   2/2/2026     Referral Priority:   Routine     Referral Type:   Consultation     Referral Reason:   Specialty Services Required     Requested Specialty:   Interventional Radiology     Number of Visits Requested:   1        Noemy Souza NP  [facility] Nephrology

## 2025-01-02 NOTE — PROGRESS NOTES
Labs reviewed, will discuss results with patient at their upcoming appointment.     FLORESITA Pierce

## 2025-01-03 NOTE — TELEPHONE ENCOUNTER
Pt does not have insurance at this time, she has to fill out paper work again. Notified her that as soon as she gets her insurance back to notify us with the information so that we can get authorization for the medication. She verbalized understanding.

## 2025-01-07 ENCOUNTER — OFFICE VISIT (OUTPATIENT)
Dept: INTERNAL MEDICINE | Facility: CLINIC | Age: 55
End: 2025-01-07

## 2025-01-07 VITALS
HEART RATE: 65 BPM | TEMPERATURE: 98 F | SYSTOLIC BLOOD PRESSURE: 152 MMHG | WEIGHT: 185.63 LBS | HEIGHT: 59 IN | DIASTOLIC BLOOD PRESSURE: 88 MMHG | RESPIRATION RATE: 18 BRPM | OXYGEN SATURATION: 95 % | BODY MASS INDEX: 37.42 KG/M2

## 2025-01-07 DIAGNOSIS — J43.9 PULMONARY EMPHYSEMA, UNSPECIFIED EMPHYSEMA TYPE: Chronic | ICD-10-CM

## 2025-01-07 DIAGNOSIS — I10 PRIMARY HYPERTENSION: ICD-10-CM

## 2025-01-07 DIAGNOSIS — G43.909 MIGRAINE WITHOUT STATUS MIGRAINOSUS, NOT INTRACTABLE, UNSPECIFIED MIGRAINE TYPE: ICD-10-CM

## 2025-01-07 DIAGNOSIS — E78.2 MIXED HYPERLIPIDEMIA: Chronic | ICD-10-CM

## 2025-01-07 DIAGNOSIS — E11.65 TYPE 2 DIABETES MELLITUS WITH HYPERGLYCEMIA, WITHOUT LONG-TERM CURRENT USE OF INSULIN: Primary | ICD-10-CM

## 2025-01-07 PROBLEM — R73.01 IFG (IMPAIRED FASTING GLUCOSE): Chronic | Status: RESOLVED | Noted: 2024-04-04 | Resolved: 2025-01-07

## 2025-01-07 PROCEDURE — 99215 OFFICE O/P EST HI 40 MIN: CPT | Mod: PBBFAC

## 2025-01-07 PROCEDURE — 99214 OFFICE O/P EST MOD 30 MIN: CPT | Mod: S$PBB,,,

## 2025-01-07 RX ORDER — IPRATROPIUM BROMIDE AND ALBUTEROL SULFATE 2.5; .5 MG/3ML; MG/3ML
3 SOLUTION RESPIRATORY (INHALATION) EVERY 4 HOURS
Qty: 75 ML | Refills: 0 | Status: SHIPPED | OUTPATIENT
Start: 2025-01-07 | End: 2026-01-07

## 2025-01-07 RX ORDER — METFORMIN HYDROCHLORIDE 500 MG/1
500 TABLET ORAL 2 TIMES DAILY WITH MEALS
Qty: 60 TABLET | Refills: 5 | Status: SHIPPED | OUTPATIENT
Start: 2025-01-07

## 2025-01-07 NOTE — PROGRESS NOTES
Candace Lassiter, FLORESITA   [facility]  [address] [address]      PATIENT NAME: Glenda Huval Dance  : 1970  DATE: 25  MRN: [id number]      Reason for Visit / Chief Complaint: Follow-up (Pt stated she is here for a lab review)       History of Present Illness / Problem Focused Workflow     Glenda Huval Dance presents to the clinic with Follow-up (Pt stated she is here for a lab review)     54 y.o. White female presents to the clinic. Medical problems include mixed conductive and SNHL bilaterally, lipoma of neck, tobacco abuse (quit 2024-40 pk yr), CAP and obesity. Followed by [facility] neurology, audiology, cardiology, rheumatology, urology and ENT clinics.     10/1/24  Pt presents for HTN follow up. Reports med compliance, refilled appropriately. Reports she rarely requires albuterol inhaler. Denies acute complaints. RTC in 3 months for wellness, may do labs with renal labs in Dec.     25  Pt presents for lab review. Lipids at goal. A1C in diabetic range, this is a new diagnosis. She is agreeable to start metformin 500 mg BID. BP elevated today, she is reporting headache today which neurology is managing. Statin on hold until kidney biopsy per nephrology. RTC 3 months with POC A1C for DM follow up          Review of Systems     Review of Systems   Constitutional:  Negative for fatigue, fever and unexpected weight change.   HENT:  Negative for ear pain, hearing loss, trouble swallowing and voice change.    Respiratory:  Negative for cough and shortness of breath.    Cardiovascular:  Negative for chest pain and palpitations.   Gastrointestinal:  Negative for abdominal pain, diarrhea and vomiting.   Genitourinary:  Negative for dysuria.   Musculoskeletal:  Negative for gait problem.   Skin:  Negative for rash and wound.   Neurological:  Negative for weakness.   Psychiatric/Behavioral:  Negative for suicidal ideas.           Medications and Allergies     Medications  Medication List with Changes/Refills   New Medications    METFORMIN (GLUCOPHAGE) 500 MG TABLET    Take 1 tablet (500 mg total) by mouth 2 (two) times daily with meals.   Current Medications    ACETAMINOPHEN (TYLENOL) 500 MG TABLET    Take 500 mg by mouth every 6 (six) hours as needed for Pain.    ALBUTEROL (VENTOLIN HFA) 90 MCG/ACTUATION INHALER    Inhale 2 puffs into the lungs every 4 (four) hours as needed for Wheezing or Shortness of Breath. Rescue    AMLODIPINE (NORVASC) 5 MG TABLET    Take 1 tablet (5 mg total) by mouth once daily.    CETIRIZINE (ZYRTEC) 10 MG TABLET    Take 1 tablet (10 mg total) by mouth every evening.    FLUTICASONE PROPIONATE (FLONASE) 50 MCG/ACTUATION NASAL SPRAY    2 sprays by Each Nostril route 2 (two) times daily.    GALCANEZUMAB-GNLM 300 MG/3 ML (100 MG/ML X 3) SYRG    Inject 3 mLs (300 mg total) into the skin as needed (at onset of cluster headache).    MIRABEGRON (MYRBETRIQ) 50 MG TB24    Take 1 tablet (50 mg total) by mouth once daily.    ONDANSETRON (ZOFRAN-ODT) 4 MG TBDL        ROSUVASTATIN (CRESTOR) 20 MG TABLET    Take 1 tablet (20 mg total) by mouth once daily.    SPIRIVA WITH HANDIHALER 18 MCG INHALATION CAPSULE    1 capsule by inhaling the contents of the capsule using the HandiHaler device Inhalation Once a day for 30 days    SUMATRIPTAN (IMITREX STATDOSE) 6 MG/0.5 ML KIT    Inject 0.5 mLs (6 mg total) into the skin every 2 (two) hours as needed for Migraine.    SYMBICORT 160-4.5 MCG/ACTUATION HFAA    Inhale 2 puffs into the lungs every 12 (twelve) hours.    TOPIRAMATE (TOPAMAX) 50 MG TABLET    Take 1 tablet (50 mg total) by mouth once daily.   Changed and/or Refilled Medications    Modified Medication Previous Medication    ALBUTEROL-IPRATROPIUM (DUO-NEB) 2.5 MG-0.5 MG/3 ML NEBULIZER SOLUTION albuterol-ipratropium (DUO-NEB) 2.5 mg-0.5 mg/3 mL nebulizer solution       Take 3 mLs by nebulization every 4 (four) hours. Rescue     Take 3 mLs by nebulization every 4 (four) hours. Rescue         Allergies  Review of patient's allergies indicates:   Allergen Reactions    Penicillins Anaphylaxis and Swelling       Physical Examination     Vitals:    01/07/25 1002   BP: (!) 152/88   Pulse:    Resp:    Temp:      Physical Exam  Vitals and nursing note reviewed.   Constitutional:       General: She is not in acute distress.     Appearance: She is not ill-appearing.   HENT:      Head: Normocephalic and atraumatic.      Mouth/Throat:      Mouth: Mucous membranes are moist.      Pharynx: Oropharynx is clear.   Eyes:      General: No scleral icterus.     Extraocular Movements: Extraocular movements intact.      Conjunctiva/sclera: Conjunctivae normal.      Pupils: Pupils are equal, round, and reactive to light.   Neck:      Vascular: No carotid bruit.   Cardiovascular:      Rate and Rhythm: Normal rate and regular rhythm.      Heart sounds: No murmur heard.     No friction rub. No gallop.   Pulmonary:      Effort: Pulmonary effort is normal. No respiratory distress.      Breath sounds: Normal breath sounds. No wheezing, rhonchi or rales.   Abdominal:      General: Abdomen is flat. Bowel sounds are normal. There is no distension.      Palpations: Abdomen is soft. There is no mass.      Tenderness: There is no abdominal tenderness.   Musculoskeletal:         General: Normal range of motion.      Cervical back: Normal range of motion and neck supple.   Skin:     General: Skin is warm and dry.   Neurological:      General: No focal deficit present.      Mental Status: She is alert.   Psychiatric:         Mood and Affect: Mood normal.           Results     Lab Results   Component Value Date    WBC 11.29 12/30/2024    RBC 4.60 12/30/2024    HGB 14.5 12/30/2024    HCT 42.5 12/30/2024    MCV 92.4 12/30/2024    MCH 31.5 (H) 12/30/2024    MCHC 34.1 12/30/2024    RDW 12.1 12/30/2024     12/30/2024    MPV 9.6 12/30/2024     Sodium   Date Value Ref Range  Status   12/30/2024 137 136 - 145 mmol/L Final     Potassium   Date Value Ref Range Status   12/30/2024 4.2 3.5 - 5.1 mmol/L Final     Chloride   Date Value Ref Range Status   12/30/2024 105 98 - 107 mmol/L Final     CO2   Date Value Ref Range Status   12/30/2024 24 22 - 29 mmol/L Final     Blood Urea Nitrogen   Date Value Ref Range Status   12/30/2024 9.3 (L) 9.8 - 20.1 mg/dL Final     Creatinine   Date Value Ref Range Status   12/30/2024 0.71 0.55 - 1.02 mg/dL Final     Calcium   Date Value Ref Range Status   12/30/2024 9.5 8.4 - 10.2 mg/dL Final     Albumin   Date Value Ref Range Status   12/30/2024 3.7 3.5 - 5.0 g/dL Final     Bilirubin Total   Date Value Ref Range Status   12/30/2024 0.7 <=1.5 mg/dL Final     ALP   Date Value Ref Range Status   12/30/2024 85 40 - 150 unit/L Final     AST   Date Value Ref Range Status   12/30/2024 48 (H) 5 - 34 unit/L Final     ALT   Date Value Ref Range Status   12/30/2024 58 (H) 0 - 55 unit/L Final     Estimated GFR-Non    Date Value Ref Range Status   12/26/2018 70 (L) >>=90 mL/min Final     Lab Results   Component Value Date    CHOL 124 01/02/2025     Lab Results   Component Value Date    HDL 32 (L) 01/02/2025     Lab Results   Component Value Date    TRIG 173 (H) 01/02/2025     Lab Results   Component Value Date    VLDL 35 01/02/2025     Lab Results   Component Value Date    LDL 57.00 01/02/2025     Lab Results   Component Value Date    TSH 2.676 01/02/2025     Lab Results   Component Value Date    PHUR 6.0 12/30/2024    SPECGRAV 1.010 11/04/2024    PROTEINUA >=300 (A) 12/30/2024    GLUCUA Negative 12/30/2024    KETONESU Negative 11/04/2024    OCCULTUA Trace (A) 12/30/2024    NITRITE Negative 12/30/2024    LEUKOCYTESUR Negative 12/30/2024     Lab Results   Component Value Date    HGBA1C 7.3 (H) 01/02/2025    HGBA1C 5.7 03/07/2024    HGBA1C 5.3 01/18/2024           Assessment         ICD-10-CM ICD-9-CM   1. Type 2 diabetes mellitus with hyperglycemia, without  long-term current use of insulin  E11.65 250.00     790.29   2. Mixed hyperlipidemia  E78.2 272.2   3. Primary hypertension  I10 401.9   4. Migraine without status migrainosus, not intractable, unspecified migraine type  G43.909 346.90   5. Pulmonary emphysema, unspecified emphysema type  J43.9 492.8       Plan      Problem List Items Addressed This Visit       Chronic obstructive pulmonary disease (Chronic)    Current Assessment & Plan     Continue spiriva and symbacort for maintenance, albuterol rescue  Use inhalers as prescribed (rinse mouth after use of steroid inhalers). Use long term inhalers daily and rescue inhaler as needed.  Avoid triggers (high humidity, strong odors, chemical fumes).  Report signs of upper respiratory infection immediately for early treatment.  Flu shot recommended yearly.  Practice abdominal breathing. Eat smaller, more frequent meals.         Relevant Medications    albuterol-ipratropium (DUO-NEB) 2.5 mg-0.5 mg/3 mL nebulizer solution    Mixed hyperlipidemia (Chronic)    Current Assessment & Plan     At goal  Resume rosuvastatin 20mg following renal biopsy  Stressed importance of dietary modifications. Follow a low cholesterol, low saturated fat diet with less that 200mg of cholesterol a day.  Avoid fried foods and high saturated fats (high saturated fats less than 7% of calories).  Add Flax Seed/Fish Oil supplements to diet. Increase dietary fiber.  Regular exercise can reduce LDL and raise HDL. Stressed importance of physical activity 5 times per week for 30 minutes per day.            Migraine without status migrainosus, not intractable    Current Assessment & Plan     Followed by neuro   Encouraged to contact clinic as pt is tearful with headache today         Primary hypertension    Current Assessment & Plan     Continue amlodipine  Elevated today, typically well controlled.   Low Sodium Diet (DASH Diet - Less than 2 grams of sodium per day).  Monitor blood pressure daily and log.  Report consistent numbers greater than 140/90.  Maintain healthy weight with goal BMI <30. Exercise 30 minutes per day, 5 days per week.           Type 2 diabetes mellitus with hyperglycemia, without long-term current use of insulin - Primary    Current Assessment & Plan     A1C 7.3- new diagnosis DM  Start metformin 500mg BID, RTC 3 months  Follow ADA Diet. Avoid soda, simple sweets, and limit rice/pasta/breads/starches (no more than 45-50 grams per meal).  Maintain healthy weight with goal BMI <30.  Exercise 5 times per week for 30 minutes per day.  Stressed importance of daily foot exams.  Stressed importance of annual dilated eye exam.           Relevant Medications    metFORMIN (GLUCOPHAGE) 500 MG tablet       Future Appointments   Date Time Provider Department Center   2/5/2025  1:45 PM Noemy Souza FNP [facility] NEPHR [place]   3/14/2025  7:00 AM [facility] MAMMO1 SCR1 [facility] ABHI [place]   4/7/2025 12:40 PM Candace Lassiter FNP [facility] INTMED [place]   5/5/2025  9:00 AM Corky Cuba, DICK [facility] UROLO [place]   5/7/2025  9:30 AM Madhavi Kuhn FNP [facility] ENT [place]   5/15/2025  1:00 PM Rajani Fajardo MD [facility] NEURO [place]   5/26/2025  1:30 PM Rae Hardy FNP [facility] GYN [place]   9/18/2025  2:00 PM Corky Rodriguez MD [facility] CARD [place]            Signature:      [facility]  [address] [address]    Date of encounter: 1/7/25

## 2025-01-07 NOTE — ASSESSMENT & PLAN NOTE
At goal  Resume rosuvastatin 20mg following renal biopsy  Stressed importance of dietary modifications. Follow a low cholesterol, low saturated fat diet with less that 200mg of cholesterol a day.  Avoid fried foods and high saturated fats (high saturated fats less than 7% of calories).  Add Flax Seed/Fish Oil supplements to diet. Increase dietary fiber.  Regular exercise can reduce LDL and raise HDL. Stressed importance of physical activity 5 times per week for 30 minutes per day.

## 2025-01-07 NOTE — ASSESSMENT & PLAN NOTE
Continue amlodipine  Elevated today, typically well controlled.   Low Sodium Diet (DASH Diet - Less than 2 grams of sodium per day).  Monitor blood pressure daily and log. Report consistent numbers greater than 140/90.  Maintain healthy weight with goal BMI <30. Exercise 30 minutes per day, 5 days per week.

## 2025-01-07 NOTE — ASSESSMENT & PLAN NOTE
A1C 7.3- new diagnosis DM  Start metformin 500mg BID, RTC 3 months  Follow ADA Diet. Avoid soda, simple sweets, and limit rice/pasta/breads/starches (no more than 45-50 grams per meal).  Maintain healthy weight with goal BMI <30.  Exercise 5 times per week for 30 minutes per day.  Stressed importance of daily foot exams.  Stressed importance of annual dilated eye exam.

## 2025-02-07 DIAGNOSIS — I10 PRIMARY HYPERTENSION: Primary | ICD-10-CM

## 2025-02-12 ENCOUNTER — PATIENT MESSAGE (OUTPATIENT)
Dept: SURGERY | Facility: HOSPITAL | Age: 55
End: 2025-02-12
Payer: MEDICAID

## 2025-02-12 ENCOUNTER — LAB VISIT (OUTPATIENT)
Dept: LAB | Facility: HOSPITAL | Age: 55
End: 2025-02-12
Attending: RADIOLOGY
Payer: MEDICAID

## 2025-02-12 DIAGNOSIS — I10 PRIMARY HYPERTENSION: ICD-10-CM

## 2025-02-12 LAB
ALBUMIN SERPL-MCNC: 4.1 G/DL (ref 3.5–5)
ALBUMIN/GLOB SERPL: 1.2 RATIO (ref 1.1–2)
ALP SERPL-CCNC: 83 UNIT/L (ref 40–150)
ALT SERPL-CCNC: 86 UNIT/L (ref 0–55)
ANION GAP SERPL CALC-SCNC: 6 MEQ/L
AST SERPL-CCNC: 65 UNIT/L (ref 5–34)
BASOPHILS # BLD AUTO: 0.04 X10(3)/MCL
BASOPHILS NFR BLD AUTO: 0.3 %
BILIRUB SERPL-MCNC: 0.9 MG/DL
BUN SERPL-MCNC: 14.2 MG/DL (ref 9.8–20.1)
CALCIUM SERPL-MCNC: 10.1 MG/DL (ref 8.4–10.2)
CHLORIDE SERPL-SCNC: 103 MMOL/L (ref 98–107)
CO2 SERPL-SCNC: 30 MMOL/L (ref 22–29)
CREAT SERPL-MCNC: 0.76 MG/DL (ref 0.55–1.02)
CREAT/UREA NIT SERPL: 19
EOSINOPHIL # BLD AUTO: 0.19 X10(3)/MCL (ref 0–0.9)
EOSINOPHIL NFR BLD AUTO: 1.6 %
ERYTHROCYTE [DISTWIDTH] IN BLOOD BY AUTOMATED COUNT: 12.1 % (ref 11.5–17)
GFR SERPLBLD CREATININE-BSD FMLA CKD-EPI: >60 ML/MIN/1.73/M2
GLOBULIN SER-MCNC: 3.5 GM/DL (ref 2.4–3.5)
GLUCOSE SERPL-MCNC: 158 MG/DL (ref 74–100)
HCT VFR BLD AUTO: 42.4 % (ref 37–47)
HGB BLD-MCNC: 14.3 G/DL (ref 12–16)
IMM GRANULOCYTES # BLD AUTO: 0.02 X10(3)/MCL (ref 0–0.04)
IMM GRANULOCYTES NFR BLD AUTO: 0.2 %
INR PPP: 1
LYMPHOCYTES # BLD AUTO: 2.39 X10(3)/MCL (ref 0.6–4.6)
LYMPHOCYTES NFR BLD AUTO: 19.6 %
MCH RBC QN AUTO: 31.5 PG (ref 27–31)
MCHC RBC AUTO-ENTMCNC: 33.7 G/DL (ref 33–36)
MCV RBC AUTO: 93.4 FL (ref 80–94)
MONOCYTES # BLD AUTO: 0.8 X10(3)/MCL (ref 0.1–1.3)
MONOCYTES NFR BLD AUTO: 6.6 %
NEUTROPHILS # BLD AUTO: 8.73 X10(3)/MCL (ref 2.1–9.2)
NEUTROPHILS NFR BLD AUTO: 71.7 %
PLATELET # BLD AUTO: 337 X10(3)/MCL (ref 130–400)
PMV BLD AUTO: 9.4 FL (ref 7.4–10.4)
POTASSIUM SERPL-SCNC: 5 MMOL/L (ref 3.5–5.1)
PROT SERPL-MCNC: 7.6 GM/DL (ref 6.4–8.3)
PROTHROMBIN TIME: <10 SECONDS (ref 12.5–14.5)
RBC # BLD AUTO: 4.54 X10(6)/MCL (ref 4.2–5.4)
SODIUM SERPL-SCNC: 139 MMOL/L (ref 136–145)
WBC # BLD AUTO: 12.17 X10(3)/MCL (ref 4.5–11.5)

## 2025-02-12 PROCEDURE — 80053 COMPREHEN METABOLIC PANEL: CPT

## 2025-02-12 PROCEDURE — 85610 PROTHROMBIN TIME: CPT

## 2025-02-12 PROCEDURE — 36415 COLL VENOUS BLD VENIPUNCTURE: CPT

## 2025-02-12 PROCEDURE — 85025 COMPLETE CBC W/AUTO DIFF WBC: CPT

## 2025-02-13 ENCOUNTER — HOSPITAL ENCOUNTER (OUTPATIENT)
Dept: INTERVENTIONAL RADIOLOGY/VASCULAR | Facility: HOSPITAL | Age: 55
Discharge: HOME | End: 2025-02-13
Attending: NURSE PRACTITIONER

## 2025-02-13 VITALS
HEART RATE: 67 BPM | DIASTOLIC BLOOD PRESSURE: 67 MMHG | OXYGEN SATURATION: 93 % | TEMPERATURE: 98 F | SYSTOLIC BLOOD PRESSURE: 103 MMHG | BODY MASS INDEX: 36.32 KG/M2 | WEIGHT: 179.81 LBS | RESPIRATION RATE: 18 BRPM

## 2025-02-13 DIAGNOSIS — R76.8 POSITIVE ANA (ANTINUCLEAR ANTIBODY): ICD-10-CM

## 2025-02-13 DIAGNOSIS — R80.9 PROTEINURIA: ICD-10-CM

## 2025-02-13 DIAGNOSIS — R80.9 PROTEINURIA, UNSPECIFIED TYPE: ICD-10-CM

## 2025-02-13 PROCEDURE — 50200 RENAL BIOPSY PERQ: CPT

## 2025-02-13 PROCEDURE — 63600175 PHARM REV CODE 636 W HCPCS: Performed by: RADIOLOGY

## 2025-02-13 RX ORDER — LIDOCAINE HYDROCHLORIDE 10 MG/ML
INJECTION, SOLUTION INFILTRATION; PERINEURAL
Status: COMPLETED | OUTPATIENT
Start: 2025-02-13 | End: 2025-02-13

## 2025-02-13 RX ORDER — MIDAZOLAM HYDROCHLORIDE 2 MG/2ML
INJECTION, SOLUTION INTRAMUSCULAR; INTRAVENOUS
Status: COMPLETED | OUTPATIENT
Start: 2025-02-13 | End: 2025-02-13

## 2025-02-13 RX ORDER — SODIUM CHLORIDE 0.9 % (FLUSH) 0.9 %
10 SYRINGE (ML) INJECTION
Status: DISCONTINUED | OUTPATIENT
Start: 2025-02-13 | End: 2025-02-14 | Stop reason: HOSPADM

## 2025-02-13 RX ORDER — FENTANYL CITRATE 50 UG/ML
INJECTION, SOLUTION INTRAMUSCULAR; INTRAVENOUS
Status: COMPLETED | OUTPATIENT
Start: 2025-02-13 | End: 2025-02-13

## 2025-02-13 RX ADMIN — FENTANYL CITRATE 50 MCG: 50 INJECTION INTRAMUSCULAR; INTRAVENOUS at 01:02

## 2025-02-13 RX ADMIN — FENTANYL CITRATE 25 MCG: 50 INJECTION INTRAMUSCULAR; INTRAVENOUS at 02:02

## 2025-02-13 RX ADMIN — LIDOCAINE HYDROCHLORIDE 3 ML: 10 INJECTION, SOLUTION INFILTRATION; PERINEURAL at 01:02

## 2025-02-13 RX ADMIN — MIDAZOLAM HYDROCHLORIDE 1 MG: 1 INJECTION, SOLUTION INTRAMUSCULAR; INTRAVENOUS at 01:02

## 2025-02-13 NOTE — DISCHARGE SUMMARY
Radiology Post-Procedure Note    Pre Op Diagnosis: Proteinuria with (+) DRAKE    Post Op Diagnosis: Same    Secondary Diagnoses:   Problem List Items Addressed This Visit       Proteinuria    Relevant Orders    IR Biopsy Kidney     Other Visit Diagnoses       Positive DRAKE (antinuclear antibody)        Relevant Orders    IR Biopsy Kidney             Procedure: CT Guided Random Left Renal Biopsy    Procedure performed by: Adilson Henley MD    Assistant: None    Written Informed Consent Obtained: Yes    Specimen Removed: 18g core x 6    Estimated Blood Loss: <10 cc    Condition: Stable    Outcome: The patient tolerated the procedure well and was without complications.    For further details please see the imaging report associated.    Disposition: Home or Self Care    Follow Up: With primary care provider    Discharge Instructions:  No discharge procedures on file.       Time Spent On Discharge: 2 minutes

## 2025-02-13 NOTE — PROGRESS NOTES
Patient updated on schedule. There will be a one hour delay due to an add-on. Patient resting quietly. Verbalized understanding. Call bell at side.

## 2025-02-13 NOTE — H&P
IR Pre Procedure Note   Subjective:      Glenda Huval Dance is a 54 y.o. female who presents today with Proteinuria with (+) DRAKE. This consultation is requested for the specific conditions prompting preoperative evaluation for: Random Renal Biopsy.     Planned anesthesia: IV sedation. The patient's code status is:   Code Status: Full Code.    The following portions of the patient's history were reviewed and updated as appropriate: allergies, current medications, past family history, past medical history, past social history, past surgical history, and problem list.    Review of Systems:  Pertinent items are noted in HPI.     Past Medical History:  Past Medical History:   Diagnosis Date    COPD (chronic obstructive pulmonary disease)     Hyperlipidemia     Sensorineural hearing loss (SNHL) of both ears         Social History:  Social History     Socioeconomic History    Marital status:    Tobacco Use    Smoking status: Former     Current packs/day: 0.00     Average packs/day: 1 pack/day for 43.0 years (43.0 ttl pk-yrs)     Types: Cigarettes     Quit date: 1/15/2024     Years since quittin.0     Passive exposure: Current    Smokeless tobacco: Never    Tobacco comments:     Has stopped for 6 months   Substance and Sexual Activity    Alcohol use: Not Currently     Alcohol/week: 2.0 standard drinks of alcohol     Types: 1 Glasses of wine, 1 Cans of beer per week     Comment: occasionally    Drug use: Not Currently     Frequency: 1.0 times per week     Types: Marijuana     Comment: Sometimes at night  to sleep    Sexual activity: Yes     Partners: Male     Birth control/protection: None     Social Drivers of Health     Financial Resource Strain: Medium Risk (2024)    Overall Financial Resource Strain (CARDIA)     Difficulty of Paying Living Expenses: Somewhat hard   Food Insecurity: Food Insecurity Present (2024)    Hunger Vital Sign     Worried About Running Out of Food in the Last Year: Sometimes  true     Ran Out of Food in the Last Year: Sometimes true   Transportation Needs: No Transportation Needs (4/4/2024)    PRAPARE - Transportation     Lack of Transportation (Medical): No     Lack of Transportation (Non-Medical): No   Physical Activity: Inactive (4/4/2024)    Exercise Vital Sign     Days of Exercise per Week: 0 days     Minutes of Exercise per Session: 0 min   Stress: No Stress Concern Present (4/4/2024)    [facility] [employer] - Occupational Stress Questionnaire     Feeling of Stress : Not at all   Recent Concern: Stress - Stress Concern Present (1/18/2024)    [facility] [employer] - Occupational Stress Questionnaire     Feeling of Stress : To some extent   Housing Stability: High Risk (4/4/2024)    Housing Stability Vital Sign     Unable to Pay for Housing in the Last Year: Yes     Number of Places Lived in the Last Year: 1     Unstable Housing in the Last Year: No        Medication History:  Current Outpatient Medications on File Prior to Encounter    Order #: [id number]: Normal    Order #: [id number]: Normal    Order #: [id number]: Normal    Order #: [id number]: Normal    Order #: [id number]: Historical Med    Order #: [id number]: Normal    Order #: [id number]: Historical Med    Order #: [id number]: Normal    Order #: [id number]: Historical Med    Order #: [id number]: Normal    Order #: [id number]: Normal    Order #: [id number]: Historical Med    Order #: [id number]: Normal    Order #: [id number]: Normal    Order #: [id number]: Normal        Allergies:  Review of patient's allergies indicates:   Allergen Reactions    Penicillins Anaphylaxis and Swelling         Objective:     Physical Exam:  Vital Signs: Temp:  [98.3 °F (36.8 °C)]   Pulse:  [73]   BP: (133)/(82)   SpO2:  [94 %]   General: No acute distress; awake, alert, and oriented x 3  HEENT: Normocephalic, atraumatic, extraocular  movements intact  Cardiac:Positive S1, S2  Respiratory: Unlabored respirations  ABD: Nontender, soft  Neurological: Grossly intact; moves all 4 extremities without difficulty  Ext: No cyanosis, clubbing, or edema    Predictors of intubation difficulty:  Sedation Risk Screen  Mallampati Scale: Class IV  ASA Classification: Class 3  Assessments  Neurological: No  HEENT: No  Cardiac: No  Dentition: No  Lungs: No  Gastrointestinal: No  Liver: No  Renal: No  Musculoskeletal: No  Airway Assessment  Previous problems with anesthesia or sedation?: No  Stridor, snoring or sleep apnea?: No  Dysmorphic Facial Features?: No  Beard?: No  Edentulous?: No  Thick/Abnormal Neck Anatomy?: No  Tumor or mass in airway?: No  Recent trauma or surgery to airway (<= 1 Month)?: No  Radiation therapy to head, neck?: No  Advanced rheumatoid arthritis with cervical pain?: No  Immobile cervical spine?: No  Complex congenital heart disease?: No  Can patient easily accomodate 2 fingers width into mouth?: Yes  Is the distance from the Alex's Apple to the tip of the chin => 3 finger widths?: No  Can mandible (jaw) appropriately move forward?: Yes  Sedation History and Plan  Previous Sedation History: Yes  Sedation Effective: Yes  Family History of Sedation Problems: No  Consent Signed: Yes  Sedation Plan: Nurse Monitoring: Moderate    Imaging  The pertinent imaging was personally reviewed for procedural planning, safety, and feasibility by Dr Adilson Henley.    Lab Review   Lab Results   Component Value Date     02/12/2025    K 5.0 02/12/2025     02/12/2025    CO2 30 (H) 02/12/2025    BUN 14.2 02/12/2025    CREATININE 0.76 02/12/2025    GLUCOSE 158 (H) 02/12/2025    CALCIUM 10.1 02/12/2025     Lab Results   Component Value Date    WBC 12.17 (H) 02/12/2025    HGB 14.3 02/12/2025    HCT 42.4 02/12/2025    MCV 93.4 02/12/2025     02/12/2025         Assessment:        54 y.o. female presenting with Proteinuria with (+) DRAKE.  The  patient is being evaluated for Random Renal Biopsy.         Plan:        Proceed with Biopsy.

## 2025-02-13 NOTE — DISCHARGE INSTRUCTIONS
`Take it easy for the next 24 hours since you have had anesthesia sedation.      `Resume home medications unless otherwise instructed by your doctor.     ` Be sure to stay hydrated.      `No bending, straining or or heavy lifting over 15 pounds for 1 week.      `You may remove your bandaid in 24 hours and shower then.      `Notify MD if you are running fever over 100.4, see any reddness or foul smelling discharge from biopsy area.      `Go to your nearest ER or call 911 if you have any difficulty breathing severe pain or notice swelling, bruising or bleeding in your right side.     `Thank you for choosing [facility] for your care and it was my pleasure taking care of you.  [phone]

## 2025-02-24 ENCOUNTER — PATIENT MESSAGE (OUTPATIENT)
Dept: NEPHROLOGY | Facility: CLINIC | Age: 55
End: 2025-02-24
Payer: MEDICAID

## 2025-03-11 ENCOUNTER — CLINICAL SUPPORT (OUTPATIENT)
Dept: AUDIOLOGY | Facility: HOSPITAL | Age: 55
End: 2025-03-11

## 2025-03-11 DIAGNOSIS — H90.6 MIXED CONDUCTIVE AND SENSORINEURAL HEARING LOSS, BILATERAL: Primary | ICD-10-CM

## 2025-03-11 DIAGNOSIS — H90.6 MIXED CONDUCTIVE AND SENSORINEURAL HEARING LOSS OF BOTH EARS: ICD-10-CM

## 2025-03-11 PROCEDURE — 92557 COMPREHENSIVE HEARING TEST: CPT | Performed by: AUDIOLOGIST

## 2025-03-11 PROCEDURE — 92552 PURE TONE AUDIOMETRY AIR: CPT | Mod: 52 | Performed by: AUDIOLOGIST

## 2025-03-11 PROCEDURE — 92567 TYMPANOMETRY: CPT | Performed by: AUDIOLOGIST

## 2025-03-11 PROCEDURE — 92556 SPEECH AUDIOMETRY COMPLETE: CPT | Mod: 52 | Performed by: AUDIOLOGIST

## 2025-03-11 PROCEDURE — 92593 HC HEARING AID CHECK, BOTH EARS: CPT | Performed by: AUDIOLOGIST

## 2025-03-11 NOTE — PROGRESS NOTES
Hearing Evaluation        Patient History: Ms. Dance has a long-standing hearing loss reporting no changes in hearing since previous evaluation. She reports occasional bilateral tinnitus. Dysequilibrium symptoms still persist. Vertigo and middle ear issues are denied. All additional history is unremarkable.        Test Results:                    Pure Tone Testing:      Right ear:       Moderate to moderately severe mixed hearing loss from 250-8kHz. Speech reception threshold is in agreement with puretone findings.  Discrimination score of 100% is considered excellent.        Left ear:          Moderately severe to severe sensorineural hearing loss from 250-8kHz. Speech reception threshold is in agreement with puretone findings.  Discrimination score of 100% is considered excellent.                                                                            Tympanometry:                                           Right ear:       Type 'C' tymp, negative middle ear pressure/function (-239daPa)     Left ear:          Type 'B' tymp, normal volume (1.02mL)                                     Interpretations:      Behavioral test findings indicate no significant changes in hearing since previous hearing evaluation. Speech reception thresholds obtained at 55dB, AD and 60dB, AS, and are in agreement with puretone findings. Speech discrimination scores of 100%, AU, are considered excellent.  Immittance measures indicate negative ME pressure, AD and abnorma/absent ME function, AS.    Hearing aid check:    Overall, Ms. Dance is doing well with hearing aids; however, states distant speech is not as clear as initial fit.  Explained that is common with longer acclimation time and adjusted accordingly.  Input bone scores so gain increase would reflect that input.  Functional gain in soundfield indicates she is receiving optimal benefit from binaural amplification. She reports improvement and no further adjustments required today.  Also replaced domes and wax guards. Recommended annual follow ups but encouraged to call is problems arise sooner.            Recommendations:   Annual hearing evaluations and hearing aid follow ups  Continue full-time amplification utilization

## 2025-03-31 ENCOUNTER — LAB VISIT (OUTPATIENT)
Dept: LAB | Facility: HOSPITAL | Age: 55
End: 2025-03-31
Attending: NURSE PRACTITIONER
Payer: MEDICAID

## 2025-03-31 DIAGNOSIS — R80.9 PROTEINURIA, UNSPECIFIED TYPE: ICD-10-CM

## 2025-03-31 LAB
ALBUMIN SERPL-MCNC: 3.9 G/DL (ref 3.5–5)
ALBUMIN/GLOB SERPL: 1.1 RATIO (ref 1.1–2)
ALP SERPL-CCNC: 87 UNIT/L (ref 40–150)
ALT SERPL-CCNC: 170 UNIT/L (ref 0–55)
ANION GAP SERPL CALC-SCNC: 9 MEQ/L
AST SERPL-CCNC: 163 UNIT/L (ref 11–45)
BACTERIA #/AREA URNS AUTO: NORMAL /HPF
BILIRUB SERPL-MCNC: 0.8 MG/DL
BILIRUB UR QL STRIP.AUTO: NEGATIVE
BUN SERPL-MCNC: 10.8 MG/DL (ref 9.8–20.1)
CALCIUM SERPL-MCNC: 9.9 MG/DL (ref 8.4–10.2)
CHLORIDE SERPL-SCNC: 104 MMOL/L (ref 98–107)
CLARITY UR: CLEAR
CO2 SERPL-SCNC: 27 MMOL/L (ref 22–29)
COLOR UR AUTO: ABNORMAL
CREAT SERPL-MCNC: 0.7 MG/DL (ref 0.55–1.02)
CREAT UR-MCNC: 32 MG/DL (ref 45–106)
CREAT/UREA NIT SERPL: 15
GFR SERPLBLD CREATININE-BSD FMLA CKD-EPI: >60 ML/MIN/1.73/M2
GLOBULIN SER-MCNC: 3.7 GM/DL (ref 2.4–3.5)
GLUCOSE SERPL-MCNC: 148 MG/DL (ref 74–100)
GLUCOSE UR QL STRIP: NEGATIVE
HGB UR QL STRIP: NEGATIVE
KETONES UR QL STRIP: NEGATIVE
LEUKOCYTE ESTERASE UR QL STRIP: NEGATIVE
NITRITE UR QL STRIP: NEGATIVE
PH UR STRIP: 6 [PH]
POTASSIUM SERPL-SCNC: 4.9 MMOL/L (ref 3.5–5.1)
PROT SERPL-MCNC: 7.6 GM/DL (ref 6.4–8.3)
PROT UR QL STRIP: 100
PROT UR STRIP-MCNC: 71.7 MG/DL
RBC #/AREA URNS AUTO: NORMAL /HPF
SODIUM SERPL-SCNC: 140 MMOL/L (ref 136–145)
SP GR UR STRIP.AUTO: 1.01 (ref 1–1.03)
SQUAMOUS #/AREA URNS AUTO: NORMAL /HPF
URINE PROTEIN/CREATININE RATIO (OLG): 2.2
UROBILINOGEN UR STRIP-ACNC: 0.2
WBC #/AREA URNS AUTO: NORMAL /HPF

## 2025-03-31 PROCEDURE — 84156 ASSAY OF PROTEIN URINE: CPT

## 2025-03-31 PROCEDURE — 81003 URINALYSIS AUTO W/O SCOPE: CPT

## 2025-03-31 PROCEDURE — 80053 COMPREHEN METABOLIC PANEL: CPT

## 2025-03-31 PROCEDURE — 36415 COLL VENOUS BLD VENIPUNCTURE: CPT

## 2025-04-01 ENCOUNTER — OFFICE VISIT (OUTPATIENT)
Dept: NEPHROLOGY | Facility: CLINIC | Age: 55
End: 2025-04-01
Payer: MEDICAID

## 2025-04-01 VITALS
DIASTOLIC BLOOD PRESSURE: 75 MMHG | OXYGEN SATURATION: 97 % | WEIGHT: 182.31 LBS | SYSTOLIC BLOOD PRESSURE: 130 MMHG | RESPIRATION RATE: 18 BRPM | HEART RATE: 56 BPM | BODY MASS INDEX: 36.75 KG/M2 | TEMPERATURE: 98 F | HEIGHT: 59 IN

## 2025-04-01 DIAGNOSIS — N18.1 CKD STAGE G1/A3, GFR > 90 AND ALBUMIN CREATININE RATIO >300 MG/G: Primary | ICD-10-CM

## 2025-04-01 DIAGNOSIS — R74.8 ELEVATED LIVER ENZYMES: ICD-10-CM

## 2025-04-01 DIAGNOSIS — E11.22 TYPE 2 DIABETES MELLITUS WITH STAGE 1 CHRONIC KIDNEY DISEASE, WITHOUT LONG-TERM CURRENT USE OF INSULIN: ICD-10-CM

## 2025-04-01 DIAGNOSIS — E66.01 SEVERE OBESITY (BMI 35.0-39.9) WITH COMORBIDITY: ICD-10-CM

## 2025-04-01 DIAGNOSIS — I10 PRIMARY HYPERTENSION: ICD-10-CM

## 2025-04-01 DIAGNOSIS — N18.1 TYPE 2 DIABETES MELLITUS WITH STAGE 1 CHRONIC KIDNEY DISEASE, WITHOUT LONG-TERM CURRENT USE OF INSULIN: ICD-10-CM

## 2025-04-01 PROCEDURE — 3051F HG A1C>EQUAL 7.0%<8.0%: CPT | Mod: CPTII,,, | Performed by: NURSE PRACTITIONER

## 2025-04-01 PROCEDURE — 99214 OFFICE O/P EST MOD 30 MIN: CPT | Mod: PBBFAC | Performed by: NURSE PRACTITIONER

## 2025-04-01 PROCEDURE — 1160F RVW MEDS BY RX/DR IN RCRD: CPT | Mod: CPTII,,, | Performed by: NURSE PRACTITIONER

## 2025-04-01 PROCEDURE — 1159F MED LIST DOCD IN RCRD: CPT | Mod: CPTII,,, | Performed by: NURSE PRACTITIONER

## 2025-04-01 PROCEDURE — 3075F SYST BP GE 130 - 139MM HG: CPT | Mod: CPTII,,, | Performed by: NURSE PRACTITIONER

## 2025-04-01 PROCEDURE — 3078F DIAST BP <80 MM HG: CPT | Mod: CPTII,,, | Performed by: NURSE PRACTITIONER

## 2025-04-01 PROCEDURE — 3066F NEPHROPATHY DOC TX: CPT | Mod: CPTII,,, | Performed by: NURSE PRACTITIONER

## 2025-04-01 PROCEDURE — 99214 OFFICE O/P EST MOD 30 MIN: CPT | Mod: S$PBB,,, | Performed by: NURSE PRACTITIONER

## 2025-04-01 PROCEDURE — 3008F BODY MASS INDEX DOCD: CPT | Mod: CPTII,,, | Performed by: NURSE PRACTITIONER

## 2025-04-01 RX ORDER — VALSARTAN 160 MG/1
160 TABLET ORAL DAILY
Qty: 90 TABLET | Refills: 3 | Status: SHIPPED | OUTPATIENT
Start: 2025-04-01 | End: 2026-04-01

## 2025-04-01 RX ORDER — IBUPROFEN 200 MG
200 TABLET ORAL DAILY
COMMUNITY

## 2025-04-01 NOTE — PROGRESS NOTES
"[facility]  Nephrology Clinic Note    Chief complaint: Follow-up and Chronic Kidney Disease (RTC, took meds, w/o complaints)    History of present illness:   Glenda Huval Dance is a 55 y.o. White female with past medical history of persistent microscopic hematuria, nonnephrotic range proteinuria, COPD, hypertension, dyslipidemia, and sensorineural hearing loss since childhood. Patient presents for follow up appointment in nephrology clinic today.      Review of Systems  12 point review of systems conducted, negative except as stated in the history of present illness.    Allergies: Patient is allergic to penicillins.     Past Medical History:  has a past medical history of COPD (chronic obstructive pulmonary disease), Hyperlipidemia, and Sensorineural hearing loss (SNHL) of both ears.    Procedure History:  has a past surgical history that includes Hysterectomy; Ear reconstruction (Right, );  section (); and Excision of soft tissue (N/A, 6/10/2024).    Family History: family history includes Cancer in her mother, sister, and sister; Diabetes in her mother; Heart disease in her father; Hypertension in her mother; Liver disease in her sister.    Social History:  reports that she quit smoking about 14 months ago. Her smoking use included cigarettes. She has a 43 pack-year smoking history. She has been exposed to tobacco smoke. She has never used smokeless tobacco. She reports that she does not currently use alcohol after a past usage of about 2.0 standard drinks of alcohol per week. She reports that she does not currently use drugs after having used the following drugs: Marijuana. Frequency: 1.00 time per week.    Physical exam  /75   Pulse (!) 56 Comment: 57 apical  Temp 98 °F (36.7 °C) (Oral)   Resp 18   Ht 4' 10.66" (1.49 m)   Wt 82.7 kg (182 lb 5.1 oz)   SpO2 97%   BMI 37.25 kg/m²   General appearance: Patient is in no acute distress.  Skin: No rashes or " wounds.  HEENT: PERRLA, EOMI, no scleral icterus, no JVD. Neck is supple.  Chest: Respirations are unlabored. Lungs sounds are clear.   Heart: S1, S2.   Abdomen: Benign.  : Deferred.  Extremities: No edema, peripheral pulses are palpable.   Neuro: No focal deficits.     Home Medications:  Current Medications[1]    Laboratory data    Lab Results   Component Value Date    WBC 12.17 (H) 02/12/2025    HGB 14.3 02/12/2025    HCT 42.4 02/12/2025     02/12/2025     03/31/2025    K 4.9 03/31/2025    CO2 27 03/31/2025    BUN 10.8 03/31/2025    CREATININE 0.70 03/31/2025    EGFRNORACEVR >60 03/31/2025    GLUCOSE 148 (H) 03/31/2025    CALCIUM 9.9 03/31/2025    ALKPHOS 87 03/31/2025    LABPROT 7.6 03/31/2025    ALBUMIN 3.9 03/31/2025    BILIDIR <0.1 12/26/2018    IBILI unable to calc 12/26/2018     (H) 03/31/2025     (H) 03/31/2025    MG 1.80 02/21/2024      Lab Results   Component Value Date    HGBA1C 7.3 (H) 01/02/2025    [id number] 40 01/02/2025    ANAHS Positive 1:160 (A) 08/23/2024    HIV Nonreactive 01/02/2025    HEPCAB Nonreactive 01/02/2025    MSPIKEPCT 0 08/23/2024     Urine:  Lab Results   Component Value Date    APPEARANCEUA Clear 03/31/2025    SGUA 1.010 03/31/2025    PROTEINUA 100 (A) 03/31/2025    KETONESUA Negative 03/31/2025    LEUKOCYTESUR Negative 03/31/2025    RBCUA None Seen 03/31/2025    WBCUA None Seen 03/31/2025    BACTERIA None Seen 03/31/2025    SQEPUA Trace (A) 01/17/2024    HYALINECASTS None Seen 01/17/2024    CREATRANDUR 32.0 (L) 03/31/2025    PROTEINURINE 71.7 03/31/2025    UPROTCREA 2.2 03/31/2025         Imaging  US Retroperitoneal Limited 07/16/2024  Grayscale and color Doppler sonographic evaluation of the kidneys.  The right kidney measures 9-10 cm. The left kidney measures 9-10 cm.   No hydronephrosis.  Grossly normal renal parenchymal echogenicity.  Impression  No significant sonographic abnormality of the kidneys.  Electronically signed by: Garcia  Chelo  Date:    07/16/2024  Time:    14:32    [facility] Surgical Pathology 02/13/2025   Final Diagnosis      Renal Pathology       Left Kidney, needle biopsy:   1. Mild mesangial and focal endocapillary proliferative glomerulonephritis, compatible with C1q nephropathy.   2. Arteriosclerosis moderate, and arteriolar hyalinosis, mild.   Electronically signed by Shelly Bradley MD on 2/26/2025 at 0834 CST   Comments       The biopsy shows mild mesangial expansion and focal and segmental mesangial and endocapillary hypercellularity; immunofluorescence shows C1q dominant deposits in glomeruli. These findings are most compatible with C1q nephropathy   (Pablito A, Yossi D, Dion M, Vanna A, Lulu J, Milton TK, Jurmaria alejandra?? V, Akosua FLORINA. Pathology, clinical presentations, and outcomes of C1q nephropathy. J Am Soc Nephrol. 2008 Nov;19(11):2237?44).   The biopsy also shows glomerulomegaly, compatible with compensatory hypertrophy. Ultrastructural features are not suggestive of Alport syndrome.        Report electronically signed by Mary Roman M.D. at [facility].       I verify that I have examined all relevant slides/materials for the specimen(s) and rendered or confirmed the diagnosis.    Microscopic Description    LIGHT MICROSCOPY: Tissue sections are stained with August, PAS, Abhinav trichrome, and Causey methenamine silver to aid in the morphological interpretation. Tissue examined by light microscopy consists of renal cortex and contains approximately 28 glomeruli, 2 of which are globally sclerotic. The glomeruli are enlarged. There is mild mesangial matrix expansion and segmental mild mesangial hypercellularity. A focal glomerulus shows segmental enlarged endothelial cells and endocapillary hypercellularity. No diagnostic segmental scars are identified. No necrotizing lesions, or crescents are identified.   TUBULES AND INTERSTITIUM: Interstitial fibrosis with tubular atrophy affects approximately 5% of the  sampled cortex. There is a focal mild mononuclear inflammatory cell infiltrate in areas of interstitial fibrosis.   VESSELS: Arteries show moderate fibrous intimal thickening. Arterioles show mild intimal hyalinosis.      IMMUNOFLUORESCENT HISTOLOGY: Immunofluorescence   staining is performed using antibodies to IgA, IgG, IgM, C1q, C3, albumin, fibrinogen, kappa, and lambda. Tissue submitted for immunofluorescence contains approximately 17 glomeruli, 7 of which are globally sclerotic. The glomeruli are enlarged. The glomeruli show segmental granular mesangial and capillary loop staining for IgG (1 to 2+), C1q (2+), and kappa and lambda light chains (both 1+). There is trace granular mesangial staining for IgM. No significant glomerular staining is identified for the other immunoreactants. Tubular protein reabsorption droplets are present and stain for albumin. No tubular basement membrane deposits are identified.      ELECTRON MICROSCOPY: 5 survey sections contain 5 at least partial glomeruli, none of which are globally sclerotic. Ultrastructural studies reveal segmental mild podocyte foot process effacement. There is mild mesangial expansion with scattered mesangial electron dense deposits. A rare subendothelial deposit is seen. The deposits do not show substructure. No tubuloreticular inclusions are identified in endothelial cells. The glomerular basement membranes do not appear thin. No basement membrane lamellations, scalloping, or microparticles are identified. No tubular basement membrane deposits are identified.   Gross Description    1. Kidney, Left:   Received are 2 vials of fixative (buffered formalin and Nico's media).  Entirely submitted to [facility] for analysis       Impression    ICD-10-CM ICD-9-CM   1. CKD stage G1/A3, GFR > 90 and albumin creatinine ratio >300 mg/g  N18.1 585.1   2. Primary hypertension  I10 401.9   3. Elevated liver enzymes  R74.8 790.5   4. Type 2 diabetes  mellitus with stage 1 chronic kidney disease, without long-term current use of insulin  E11.22 250.40    N18.1 585.1   5. 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  Kidney ultrasound was unremarkable.  DRAKE was positive (speckled pattern, 1:160 titer), complement levels are WNL, SPEP negative for M spike.  MPO, PR3, and anti GBM antibody titers were below cutoff.  Patient has established care with rheumatologist (Dr Han).  Kidney biopsy revealed findings consistent with C1q nephropathy.  We discussed kidney biopsy results and possible treatment options, all questions were answered and concerns addressed.  There are no indications for immunosuppressive therapy at this time.  Will start angiotensin receptor blocker for kidney protective benefit and proteinuria reducing benefit, consider adding SGLT2 inhibitor in the future as well.    Primary hypertension  -     valsartan (DIOVAN) 160 MG tablet; Take 1 tablet (160 mg total) by mouth once daily.  Dispense: 90 tablet; Refill: 3  Patient would benefit from a angiotensin receptor blocker since she has severe proteinuria.  Will discontinue amlodipine and start valsartan instead.  Repeat CMP in 2 weeks.    Elevated liver enzymes  -     Comprehensive Metabolic Panel; Future; Expected date: 04/15/2025  Patient admits to alcohol use last weekend.  She was instructed to abstain from alcohol completely, decrease NSAID and acetaminophen use.  Continue to hold statin.  Will repeat CMP/LFT in 2 weeks.  If transaminitis persists, will pursue further evaluation.    Type 2 diabetes mellitus  with stage 1 chronic kidney disease, without long-term current use of insulin  Continue metformin low carb diet.  Consider starting SGLT2 inhibitor if renal indices remain stable.      Severe obesity (BMI 35.0-39.9) with comorbidity  Lifestyle and dietary interventions discussed, patient encouraged to maintain non-sedentary lifestyle and well-balanced diet.     Follow up in about 3 months (around 7/1/2025).     Noemy Souza NP  [facility] Nephrology            [1]   Current Outpatient Medications:     acetaminophen (TYLENOL) 500 MG tablet, Take 500 mg by mouth every 6 (six) hours as needed for Pain., Disp: , Rfl:     albuterol (VENTOLIN HFA) 90 mcg/actuation inhaler, Inhale 2 puffs into the lungs every 4 (four) hours as needed for Wheezing or Shortness of Breath. Rescue, Disp: 18 g, Rfl: 2    albuterol-ipratropium (DUO-NEB) 2.5 mg-0.5 mg/3 mL nebulizer solution, Take 3 mLs by nebulization every 4 (four) hours. Rescue, Disp: 75 mL, Rfl: 0    cetirizine (ZYRTEC) 10 MG tablet, Take 1 tablet (10 mg total) by mouth every evening., Disp: 90 tablet, Rfl: 3    fluticasone propionate (FLONASE) 50 mcg/actuation nasal spray, 2 sprays by Each Nostril route 2 (two) times daily., Disp: , Rfl:     metFORMIN (GLUCOPHAGE) 500 MG tablet, Take 1 tablet (500 mg total) by mouth 2 (two) times daily with meals., Disp: 60 tablet, Rfl: 5    SPIRIVA WITH HANDIHALER 18 mcg inhalation capsule, 1 capsule by inhaling the contents of the capsule using the HandiHaler device Inhalation Once a day for 30 days, Disp: , Rfl:     SYMBICORT 160-4.5 mcg/actuation HFAA, Inhale 2 puffs into the lungs every 12 (twelve) hours., Disp: 11 g, Rfl: 3    galcanezumab-gnlm 300 mg/3 mL (100 mg/mL x 3) Syrg, Inject 3 mLs (300 mg total) into the skin as needed (at onset of cluster headache). (Patient not taking: Reported on 4/1/2025), Disp: 3 mL, Rfl: 0    ibuprofen (ADVIL,MOTRIN) 200 MG tablet, Take 200 mg by mouth once daily. Taking 3 tab every AM, Disp: , Rfl:      mirabegron (MYRBETRIQ) 50 mg Tb24, Take 1 tablet (50 mg total) by mouth once daily. (Patient not taking: Reported on 4/1/2025), Disp: 90 tablet, Rfl: 2    ondansetron (ZOFRAN-ODT) 4 MG TbDL, , Disp: , Rfl:     rosuvastatin (CRESTOR) 20 MG tablet, Take 1 tablet (20 mg total) by mouth once daily. (Patient not taking: Reported on 4/1/2025), Disp: 90 tablet, Rfl: 3    sumatriptan (IMITREX STATDOSE) 6 mg/0.5 mL kit, Inject 0.5 mLs (6 mg total) into the skin every 2 (two) hours as needed for Migraine. (Patient not taking: Reported on 12/31/2024), Disp: 5 mL, Rfl: 6    topiramate (TOPAMAX) 50 MG tablet, Take 1 tablet (50 mg total) by mouth once daily. (Patient not taking: Reported on 4/1/2025), Disp: 90 tablet, Rfl: 2    valsartan (DIOVAN) 160 MG tablet, Take 1 tablet (160 mg total) by mouth once daily., Disp: 90 tablet, Rfl: 3

## 2025-04-08 ENCOUNTER — OFFICE VISIT (OUTPATIENT)
Dept: INTERNAL MEDICINE | Facility: CLINIC | Age: 55
End: 2025-04-08

## 2025-04-08 VITALS
BODY MASS INDEX: 37.72 KG/M2 | RESPIRATION RATE: 18 BRPM | OXYGEN SATURATION: 98 % | SYSTOLIC BLOOD PRESSURE: 132 MMHG | HEIGHT: 58 IN | WEIGHT: 179.69 LBS | DIASTOLIC BLOOD PRESSURE: 87 MMHG | HEART RATE: 63 BPM

## 2025-04-08 DIAGNOSIS — I10 PRIMARY HYPERTENSION: ICD-10-CM

## 2025-04-08 DIAGNOSIS — Z00.00 WELL ADULT EXAM: ICD-10-CM

## 2025-04-08 DIAGNOSIS — J43.9 PULMONARY EMPHYSEMA, UNSPECIFIED EMPHYSEMA TYPE: ICD-10-CM

## 2025-04-08 DIAGNOSIS — E11.65 TYPE 2 DIABETES MELLITUS WITH HYPERGLYCEMIA, WITHOUT LONG-TERM CURRENT USE OF INSULIN: Primary | ICD-10-CM

## 2025-04-08 PROBLEM — R05.9 COUGH: Status: RESOLVED | Noted: 2024-01-17 | Resolved: 2025-04-08

## 2025-04-08 LAB — HBA1C MFR BLD: 7.2 %

## 2025-04-08 PROCEDURE — 99214 OFFICE O/P EST MOD 30 MIN: CPT | Mod: PBBFAC

## 2025-04-08 PROCEDURE — 3066F NEPHROPATHY DOC TX: CPT | Mod: ,,,

## 2025-04-08 PROCEDURE — 99214 OFFICE O/P EST MOD 30 MIN: CPT | Mod: S$PBB,,,

## 2025-04-08 PROCEDURE — 83036 HEMOGLOBIN GLYCOSYLATED A1C: CPT | Mod: PBBFAC

## 2025-04-08 PROCEDURE — 4010F ACE/ARB THERAPY RXD/TAKEN: CPT | Mod: ,,,

## 2025-04-08 RX ORDER — BUDESONIDE AND FORMOTEROL FUMARATE DIHYDRATE 160; 4.5 UG/1; UG/1
2 AEROSOL RESPIRATORY (INHALATION) EVERY 12 HOURS
Qty: 11 G | Refills: 3 | Status: SHIPPED | OUTPATIENT
Start: 2025-04-08

## 2025-04-08 NOTE — ASSESSMENT & PLAN NOTE
Continue valsartan  Elevated today, typically well controlled.   Low Sodium Diet (DASH Diet - Less than 2 grams of sodium per day).  Monitor blood pressure daily and log. Report consistent numbers greater than 140/90.  Maintain healthy weight with goal BMI <30. Exercise 30 minutes per day, 5 days per week.

## 2025-04-08 NOTE — ASSESSMENT & PLAN NOTE
A1C 7.2, previously 7.3 (new dx)  Continue metformin 500mg BID  Follow ADA Diet. Avoid soda, simple sweets, and limit rice/pasta/breads/starches (no more than 45-50 grams per meal).  Maintain healthy weight with goal BMI <30.  Exercise 5 times per week for 30 minutes per day.  Stressed importance of daily foot exams.  Stressed importance of annual dilated eye exam.

## 2025-04-08 NOTE — PROGRESS NOTES
Candace Lassiter, FLORESITA   [facility]  [address] [facility] [address]      PATIENT NAME: Glenda Huval Dance  : 1970  DATE: 25  MRN: [id number]      Reason for Visit / Chief Complaint: Diabetes (Pt is here for diabetes f/u)       History of Present Illness / Problem Focused Workflow     Glenda Huval Dance presents to the clinic with Diabetes (Pt is here for diabetes f/u)     54 y.o. White female presents to the clinic. Medical problems include mixed conductive and SNHL bilaterally, lipoma of neck, tobacco abuse (quit 2024-40 pk yr), CAP and obesity. Followed by [facility] neurology, audiology, cardiology, rheumatology, urology and ENT clinics.     10/1/24  Pt presents for HTN follow up. Reports med compliance, refilled appropriately. Reports she rarely requires albuterol inhaler. Denies acute complaints. RTC in 3 months for wellness, may do labs with renal labs in Dec.     25  Pt presents for lab review. Lipids at goal. A1C in diabetic range, this is a new diagnosis. She is agreeable to start metformin 500 mg BID. BP elevated today, she is reporting headache today which neurology is managing. Statin on hold until kidney biopsy per nephrology. RTC 3 months with POC A1C for DM follow up    25  Pt presents for DM follow up. POC A1C today is 7.2, previously 7.3. She reports compliance with metformin and she has been making efforts to eat lower carb for the last 2 weeks. Encouraged to continue with diet efforts, goal A1C <7. Discussed possible inc in metformin dose but will hold off for now. Renal labs with significant LFT elevation, she is scheduled for follow up and repeat labs this month. She was instructed to continue holding statin. She is declining all wellness/screenings at this time due to her insurance being inactive, will re address at NOV. She is off a lot of her medications due to cost, encouraged to resume symbicort. RTC 6 months  for wellness and DM/HTN follow up          Review of Systems     Review of Systems   Constitutional:  Negative for fatigue, fever and unexpected weight change.   HENT:  Negative for ear pain, hearing loss, trouble swallowing and voice change.    Respiratory:  Negative for cough and shortness of breath.    Cardiovascular:  Negative for chest pain and palpitations.   Gastrointestinal:  Negative for abdominal pain, diarrhea and vomiting.   Genitourinary:  Negative for dysuria.   Musculoskeletal:  Negative for gait problem.   Skin:  Negative for rash and wound.   Neurological:  Negative for weakness.   Psychiatric/Behavioral:  Negative for suicidal ideas.          Medications and Allergies     Medications  Medication List with Changes/Refills   Current Medications    ACETAMINOPHEN (TYLENOL) 500 MG TABLET    Take 500 mg by mouth every 6 (six) hours as needed for Pain.    ALBUTEROL (VENTOLIN HFA) 90 MCG/ACTUATION INHALER    Inhale 2 puffs into the lungs every 4 (four) hours as needed for Wheezing or Shortness of Breath. Rescue    ALBUTEROL-IPRATROPIUM (DUO-NEB) 2.5 MG-0.5 MG/3 ML NEBULIZER SOLUTION    Take 3 mLs by nebulization every 4 (four) hours. Rescue    CETIRIZINE (ZYRTEC) 10 MG TABLET    Take 1 tablet (10 mg total) by mouth every evening.    FLUTICASONE PROPIONATE (FLONASE) 50 MCG/ACTUATION NASAL SPRAY    2 sprays by Each Nostril route 2 (two) times daily.    GALCANEZUMAB-GNLM 300 MG/3 ML (100 MG/ML X 3) SYRG    Inject 3 mLs (300 mg total) into the skin as needed (at onset of cluster headache).    IBUPROFEN (ADVIL,MOTRIN) 200 MG TABLET    Take 200 mg by mouth once daily. Taking 3 tab every AM    METFORMIN (GLUCOPHAGE) 500 MG TABLET    Take 1 tablet (500 mg total) by mouth 2 (two) times daily with meals.    MIRABEGRON (MYRBETRIQ) 50 MG TB24    Take 1 tablet (50 mg total) by mouth once daily.    ONDANSETRON (ZOFRAN-ODT) 4 MG TBDL        ROSUVASTATIN (CRESTOR) 20 MG TABLET    Take 1 tablet (20 mg total) by mouth once  daily.    SPIRIVA WITH HANDIHALER 18 MCG INHALATION CAPSULE    1 capsule by inhaling the contents of the capsule using the HandiHaler device Inhalation Once a day for 30 days    SUMATRIPTAN (IMITREX STATDOSE) 6 MG/0.5 ML KIT    Inject 0.5 mLs (6 mg total) into the skin every 2 (two) hours as needed for Migraine.    TOPIRAMATE (TOPAMAX) 50 MG TABLET    Take 1 tablet (50 mg total) by mouth once daily.    VALSARTAN (DIOVAN) 160 MG TABLET    Take 1 tablet (160 mg total) by mouth once daily.   Changed and/or Refilled Medications    Modified Medication Previous Medication    SYMBICORT 160-4.5 MCG/ACTUATION HFAA SYMBICORT 160-4.5 mcg/actuation HFAA       Inhale 2 puffs into the lungs every 12 (twelve) hours.    Inhale 2 puffs into the lungs every 12 (twelve) hours.         Allergies  Review of patient's allergies indicates:   Allergen Reactions    Penicillins Anaphylaxis and Swelling       Physical Examination     Vitals:    04/08/25 0853   BP: 132/87   Pulse: 63   Resp: 18     Physical Exam  Vitals and nursing note reviewed.   Constitutional:       General: She is not in acute distress.     Appearance: She is not ill-appearing.   HENT:      Head: Normocephalic and atraumatic.      Mouth/Throat:      Mouth: Mucous membranes are moist.      Pharynx: Oropharynx is clear.   Eyes:      General: No scleral icterus.     Extraocular Movements: Extraocular movements intact.      Conjunctiva/sclera: Conjunctivae normal.      Pupils: Pupils are equal, round, and reactive to light.   Neck:      Vascular: No carotid bruit.   Cardiovascular:      Rate and Rhythm: Normal rate and regular rhythm.      Heart sounds: No murmur heard.     No friction rub. No gallop.   Pulmonary:      Effort: Pulmonary effort is normal. No respiratory distress.      Breath sounds: Normal breath sounds. No wheezing, rhonchi or rales.   Abdominal:      General: Abdomen is flat. Bowel sounds are normal. There is no distension.      Palpations: Abdomen is soft.  There is no mass.      Tenderness: There is no abdominal tenderness.   Musculoskeletal:         General: Normal range of motion.      Cervical back: Normal range of motion and neck supple.   Skin:     General: Skin is warm and dry.   Neurological:      General: No focal deficit present.      Mental Status: She is alert.   Psychiatric:         Mood and Affect: Mood normal.           Results     Lab Results   Component Value Date    WBC 12.17 (H) 02/12/2025    RBC 4.54 02/12/2025    HGB 14.3 02/12/2025    HCT 42.4 02/12/2025    MCV 93.4 02/12/2025    MCH 31.5 (H) 02/12/2025    MCHC 33.7 02/12/2025    RDW 12.1 02/12/2025     02/12/2025    MPV 9.4 02/12/2025     Sodium   Date Value Ref Range Status   03/31/2025 140 136 - 145 mmol/L Final     Potassium   Date Value Ref Range Status   03/31/2025 4.9 3.5 - 5.1 mmol/L Final     Chloride   Date Value Ref Range Status   03/31/2025 104 98 - 107 mmol/L Final     CO2   Date Value Ref Range Status   03/31/2025 27 22 - 29 mmol/L Final     Blood Urea Nitrogen   Date Value Ref Range Status   03/31/2025 10.8 9.8 - 20.1 mg/dL Final     Creatinine   Date Value Ref Range Status   03/31/2025 0.70 0.55 - 1.02 mg/dL Final     Calcium   Date Value Ref Range Status   03/31/2025 9.9 8.4 - 10.2 mg/dL Final     Albumin   Date Value Ref Range Status   03/31/2025 3.9 3.5 - 5.0 g/dL Final     Bilirubin Total   Date Value Ref Range Status   03/31/2025 0.8 <=1.5 mg/dL Final     ALP   Date Value Ref Range Status   03/31/2025 87 40 - 150 unit/L Final     AST   Date Value Ref Range Status   03/31/2025 163 (H) 11 - 45 unit/L Final     ALT   Date Value Ref Range Status   03/31/2025 170 (H) 0 - 55 unit/L Final     Estimated GFR-Non    Date Value Ref Range Status   12/26/2018 70 (L) >>=90 mL/min Final     Lab Results   Component Value Date    CHOL 124 01/02/2025     Lab Results   Component Value Date    HDL 32 (L) 01/02/2025     Lab Results   Component Value Date    TRIG 173 (H)  01/02/2025     Lab Results   Component Value Date    VLDL 35 01/02/2025     Lab Results   Component Value Date    LDL 57.00 01/02/2025     Lab Results   Component Value Date    TSH 2.676 01/02/2025     Lab Results   Component Value Date    PHUR 6.0 03/31/2025    SPECGRAV 1.010 11/04/2024    PROTEINUA 100 (A) 03/31/2025    GLUCUA Negative 03/31/2025    KETONESU Negative 11/04/2024    OCCULTUA Negative 03/31/2025    NITRITE Negative 03/31/2025    LEUKOCYTESUR Negative 03/31/2025     Lab Results   Component Value Date    HGBA1C 7.3 (H) 01/02/2025    HGBA1C 5.7 03/07/2024    HGBA1C 5.3 01/18/2024           Assessment         ICD-10-CM ICD-9-CM   1. Type 2 diabetes mellitus with hyperglycemia, without long-term current use of insulin  E11.65 250.00     790.29   2. Pulmonary emphysema, unspecified emphysema type  J43.9 492.8   3. Well adult exam  Z00.00 V70.0   4. Primary hypertension  I10 401.9       Plan      Problem List Items Addressed This Visit       Chronic obstructive pulmonary disease (Chronic)    Current Assessment & Plan   Continue spiriva and symbacort for maintenance, albuterol rescue  Use inhalers as prescribed (rinse mouth after use of steroid inhalers). Use long term inhalers daily and rescue inhaler as needed.  Avoid triggers (high humidity, strong odors, chemical fumes).  Report signs of upper respiratory infection immediately for early treatment.  Flu shot recommended yearly.  Practice abdominal breathing. Eat smaller, more frequent meals.         Relevant Medications    SYMBICORT 160-4.5 mcg/actuation HFAA    Primary hypertension    Current Assessment & Plan   Continue valsartan  Elevated today, typically well controlled.   Low Sodium Diet (DASH Diet - Less than 2 grams of sodium per day).  Monitor blood pressure daily and log. Report consistent numbers greater than 140/90.  Maintain healthy weight with goal BMI <30. Exercise 30 minutes per day, 5 days per week.           Type 2 diabetes mellitus with  hyperglycemia, without long-term current use of insulin - Primary    Current Assessment & Plan   A1C 7.2, previously 7.3 (new dx)  Continue metformin 500mg BID  Follow ADA Diet. Avoid soda, simple sweets, and limit rice/pasta/breads/starches (no more than 45-50 grams per meal).  Maintain healthy weight with goal BMI <30.  Exercise 5 times per week for 30 minutes per day.  Stressed importance of daily foot exams.  Stressed importance of annual dilated eye exam.           Relevant Orders    POCT HEMOGLOBIN A1C    Comprehensive Metabolic Panel    Hemoglobin A1C    Lipid Panel     Other Visit Diagnoses         Well adult exam        Relevant Orders    CBC Auto Differential    Comprehensive Metabolic Panel    Hemoglobin A1C    Lipid Panel    TSH    T4, Free    Urinalysis, Reflex to Urine Culture            Future Appointments   Date Time Provider Department Center   4/29/2025  9:30 AM Corky [place] NP [facility] UROLO [place]   5/7/2025  9:30 AM Madhavi Kuhn MICHAEL [facility] ENT [place]   5/15/2025  1:00 PM Rajani Fajardo MD [facility] NEURO [place]   5/26/2025  1:30 PM Rae Hardy, MICHAEL [facility] GYN [place]   7/3/2025 10:15 AM Noemy Souza [facility] NEPHR [place]   9/18/2025  2:00 PM Corky Rodriguez MD [facility] CARD [place]   10/21/2025  8:20 AM Candace Lassiter, [facility] INTMED [place]            Signature:      [facility]  [address] [facility] [address]    Date of encounter: 4/8/25

## 2025-04-16 ENCOUNTER — LAB VISIT (OUTPATIENT)
Dept: LAB | Facility: HOSPITAL | Age: 55
End: 2025-04-16
Attending: NURSE PRACTITIONER

## 2025-04-16 DIAGNOSIS — R74.8 ELEVATED LIVER ENZYMES: ICD-10-CM

## 2025-04-16 LAB
ALBUMIN SERPL-MCNC: 4.2 G/DL (ref 3.5–5)
ALBUMIN/GLOB SERPL: 1.3 RATIO (ref 1.1–2)
ALP SERPL-CCNC: 72 UNIT/L (ref 40–150)
ALT SERPL-CCNC: 116 UNIT/L (ref 0–55)
ANION GAP SERPL CALC-SCNC: 9 MEQ/L
AST SERPL-CCNC: 85 UNIT/L (ref 11–45)
BILIRUB SERPL-MCNC: 1 MG/DL
BUN SERPL-MCNC: 16.6 MG/DL (ref 9.8–20.1)
CALCIUM SERPL-MCNC: 10.1 MG/DL (ref 8.4–10.2)
CHLORIDE SERPL-SCNC: 105 MMOL/L (ref 98–107)
CO2 SERPL-SCNC: 26 MMOL/L (ref 22–29)
CREAT SERPL-MCNC: 0.78 MG/DL (ref 0.55–1.02)
CREAT/UREA NIT SERPL: 21
GFR SERPLBLD CREATININE-BSD FMLA CKD-EPI: >60 ML/MIN/1.73/M2
GLOBULIN SER-MCNC: 3.2 GM/DL (ref 2.4–3.5)
GLUCOSE SERPL-MCNC: 137 MG/DL (ref 74–100)
POTASSIUM SERPL-SCNC: 4.9 MMOL/L (ref 3.5–5.1)
PROT SERPL-MCNC: 7.4 GM/DL (ref 6.4–8.3)
SODIUM SERPL-SCNC: 140 MMOL/L (ref 136–145)

## 2025-04-16 PROCEDURE — 80053 COMPREHEN METABOLIC PANEL: CPT

## 2025-04-16 PROCEDURE — 36415 COLL VENOUS BLD VENIPUNCTURE: CPT

## 2025-05-07 ENCOUNTER — OFFICE VISIT (OUTPATIENT)
Dept: OTOLARYNGOLOGY | Facility: CLINIC | Age: 55
End: 2025-05-07

## 2025-05-07 DIAGNOSIS — H90.6 MIXED CONDUCTIVE AND SENSORINEURAL HEARING LOSS OF BOTH EARS: ICD-10-CM

## 2025-05-07 DIAGNOSIS — R42 DIZZINESS: Primary | ICD-10-CM

## 2025-05-07 DIAGNOSIS — R09.81 NASAL CONGESTION: ICD-10-CM

## 2025-05-07 RX ORDER — FLUTICASONE PROPIONATE 50 MCG
2 SPRAY, SUSPENSION (ML) NASAL 2 TIMES DAILY
Qty: 16 G | Refills: 11 | Status: SHIPPED | OUTPATIENT
Start: 2025-05-07

## 2025-05-07 NOTE — PROGRESS NOTES
"Audio Only Telehealth Visit     The patient location is: state [facility]  The chief complaint leading to consultation is: f/u  Visit type: Virtual visit with audio only (telephone)  Total time spent on medical discussion: 11 min  Total time spent on visit: 13 min     The reason for the audio only service rather than synchronous audio and video virtual visit was related to technical difficulties or patient preference/necessity.     Each patient to whom I provide medical services by telemedicine is:  (1) informed of the relationship between the physician and patient and the respective role of any other health care provider with respect to management of the patient; and (2) notified that they may decline to receive medical services by telemedicine and may withdraw from such care at any time. Patient verbally consented to receive this service via voice-only telephone call.       HPI: 46 yo female referred for evaluation of a thyroid nodule and hearing loss. In October, patient suffered an episode of angioedema after taking Augmentin and required 5 days of intubation. At that time a scan was obtained and she was incidentally found to have thyroid nodules measuring up to 1.2 cm. She also has a longstanding history of hearing loss. She states that since she was a kid she has had left sided hearing loss, now has some in the right. Had pulsatile tinnitus in the right and underwent repair of what sounds like an encephalocele by Dr. Villalobos (?) in [place] about 8-9 years ago. Was suppose to have surgery on the left and "rods" placed in her ears (? OCR or stapes) but lost her insurance. Last audio was 7 years ago. Patient also complains of constantly needing to clear her throat and persistent hoarseness since her intubation. Per record intubation was uneventful and a 7.5 ETT was used.    1/23/18: Improved hoarseness since last visit, did not see ST or pick-up PPI. Denies otalgia, odynophagia, dysphagia, dyspnea, new " masses/lesions. Weight stable. Unable to perform audio 2/2 cerumen impaction. Thyroid US shows 2 solid nodules on left, amenable to FNA. Has appointment on 1/30/18 with IR per patient; received a message yesterday.    5/17/18: Here for f/u. Pt had surgery with [place] and [place] in [facility] a few years ago. 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 She has persistent right CHL and worse LEft CHL. 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 Was going to get BAHA with [place] but lost insurance.  Shes unsure why she is here today     1/10/19: Here for f/u for CHL and thyroid nodules. Hearing stable subjectively. Had episode of spinning dizziness exacerbated by head movement on 12/26/18, but this resolved and she hasn't had any spinning since.     4/25/19: 49yoF smoker that presents for evaluation for FNA. She had a thyroid US in 2018 that showed a 2cm left superior pole nodule and 1.5cm inferior pole nodule. She notes globus sensation and weird sensation in throat. Intermittent dysphonia worst in the AM. PND. Thick mucus/phlegm that requires clearing her throat/coughing often. Discomfort in midline chest intermittently. Not on PPI. Most recent TFTs were WNL. Denies dyspnea, dysphagia, weight loss. Smokes 1-1.5PPD     2/11/20: Here for annual follow-up with audio. Audio stable. Patient reports dizziness that occurred a few months ago with spinning, nausea, vomiting exacerbated with head movements. This resolved and since she has had dizziness with eye movement at rest, with ambulation or driving. Last seconds. Feels that her vision becomes blurry. Feels hearing is stable, no otalgia, otorrhea, aural fullness or changes in tinnitus. Current smoker.     April 26, 2022: Patient presents today for follow-up of mass involving her right posterior neck. In reviewing the patient's chart patient was found to have probable lipoma involving the right posterior neck. Patient had  a CT scan of the neck done on October 5, 2017, and it showed findings of a 3.6 x 2.2 cm mass in the right posterior neck and subcutaneous tissue which would be consistent with a lipoma. An ultrasound of the soft tissue of the neck was then done on March 7, 2018, and showed the lesion had increased in size to 5.2 x 5.0 x 1.8cm.  Since that time the patient has felt that the lesion has at least doubled in size. She does experience some occipital headaches on that side as well as some soreness in the neck and she is not sure if it is related to this lesion. She has no weakness or paresthesias involving the upper extremities or shoulder. No tenderness. No drainage.  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 Ng several years ago. She had a persistent right conductive hearing loss and a left hearing loss. Records indicate that she also likely had a dehiscence on the left with an encephalocele. She was to get a BAHA hearing aid with Dr. Villalobos, but had lost her insurance. Her last audiogram was in February 2020.    July 20, 2022: 52-year-old female presents today for follow-up of suspected lipoma the right posterior neck and hearing loss with history of superior semicircular canal dehiscence.  In regards to her lipoma she has not noticed any change since her last appointment.  She had a CT scan of the soft tissues of the neck with contrast done on July 14, 2022, which showed the presence of a 4.8 x 7.9 x 5.4 cm subcutaneous soft tissue mass with consistency of a lipoma in the right posterior neck.  Previously on a CT scan done on October 5, 2017 it measured 2.3 x 3.8 x 2.4 cm.  On ultrasound done on Brie 3, 2022, the lesion measured 9.1 x 8.5 x 3.2 cm.  Lesion appeared to be confined to the subcutaneous tissue and did not invade any underlying muscle.  Radiologist indicated  there were no concerning factors.  Results were discussed with the patient today.  The patient is still having some discomfort in her neck and occipital region as previously described.  Audiogram was done on May 16, 2022, and this showed the presence of a moderate to severe mixed hearing loss in the right ear at 250-8000 hertz and in the left ear a moderate to severe mixed hearing loss at 250-8000 hertz.  Speech reception threshold was 50 decibels in the right ear and 60 decibels in left ear with 100% discrimination in the right ear 96% discrimination in the left ear.  She had type B tympanograms bilaterally.  On examination of the ears on the CT scan of the neck she does not appear to have middle ear effusions.      3/11/24: Referred back from audiology for hearing aid clearance. States she still desires to have neck mass removed. Reports that her sister got very sick & was in ICU in [place] last year, and she was lost to f/u related to that and due to losing her insurance. She feels that the mass is continuing to grow. She has grown her hair long to try and disguise it. C/o frequent and debilitating headaches for which she has taken fioricet without improvement and was then (last week) placed on imitrex and topamax daily. She has not noticed any improvement yet. Admits to taking BC powder frequently. Headaches have been ongoing for years but seem to be getting worse over the past 1-2 years. States she has had migraines but that this is different. They begin with a shooting/stabbing pain behind her right eye and radiate to her head and down to occiput. She feels like they might be r/t her sinuses. Endorses nasal congestion for which she uses OTC nasal spray occasionally. No hx of recurrent sinus infections. Also c/o frequent dysequilibrium described as things appear moving when she knows they are not. This seems to be associated with quick eye movements in any direction. It often occurs when she looks up from her  phone, when she uses stairs, and when she is driving. Episodes last minutes and have been present for many years. Denies falls or head trauma. States she usually does not mention it because she feels crazy describing it.    5/7/24: Reports she is extremely happy with hearing aids. Is awaiting pulm and cards clearance to have back mass removal by general surgery. No change in dizziness. Headaches have improved but not completely resolved, however, this is not associated with dizziness.     05/07/2025: Doing well. Saw neurology last Nov. for cluster headaches & potential central dizziness. She was unable to try prescribed meds 2/2 insurance denial and then she lost her insurance. States that she had not been having issues with headaches or dizziness recently. Nasal symptoms are well controlled with flonase.     Audio:        Assessment and plan:  55 y.o. female with dysequilibrium, neck pain, AR/nasal congestion. 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 Villalobos). Audio 3/11/25 stable. VNG with compensated right vestibular weakness, possible CNS component- reviewed. CT temporal bones 2/2018 with b/l tegmen dehiscence. CT neck 7/2022 unremarkable for chronic sinus dz. CT head 3/18/24 & MRI IAC 4/3/24 stable from previous. Denies any current c/o. Offerred annual audio, but pt states she will wait until she has insurance issue settled.   - Continue amplification  - Flonase BID for nasal congestion/rhinits   - RTC PRN      Madhavi Kuhn NP      This service was not originating from a related E/M service provided within the previous 7 days nor will  to an E/M service or procedure within the next 24 hours or my soonest available appointment.  Prevailing standard of care was able to be met in this audio-only visit.

## 2025-07-18 ENCOUNTER — PATIENT OUTREACH (OUTPATIENT)
Facility: CLINIC | Age: 55
End: 2025-07-18

## 2025-07-21 DIAGNOSIS — N18.1 CKD STAGE G1/A3, GFR > 90 AND ALBUMIN CREATININE RATIO >300 MG/G: Primary | ICD-10-CM

## (undated) DEVICE — GLOVE SENSICARE PI GRN 6.5

## (undated) DEVICE — GLOVE SIGNATURE MICRO LTX 7.5

## (undated) DEVICE — SOL 9P NACL IRR PIC IL

## (undated) DEVICE — GLOVE SIGNATURE MICRO LTX 6

## (undated) DEVICE — Device

## (undated) DEVICE — GLOVE SENSICARE PI GRN 7.5

## (undated) DEVICE — SPONGE LAP 18X18 PREWASHED

## (undated) DEVICE — ELECTRODE PATIENT RETURN DISP

## (undated) DEVICE — GOWN POLY REINF BRTH SLV XL

## (undated) DEVICE — APPLICATOR CHLORAPREP ORN 26ML

## (undated) DEVICE — SUT VICRYL 3-0 27 SH

## (undated) DEVICE — SYR 10CC LUER LOCK

## (undated) DEVICE — SUT MCRYL PLUS 4-0 PS2 27IN

## (undated) DEVICE — SUT VICRYL+ 2-0 SH 18IN

## (undated) DEVICE — SUT 2/0 30IN SILK BLK BRAI

## (undated) DEVICE — MANIFOLD 4 PORT

## (undated) DEVICE — NDL HYPO REG 25G X 1 1/2

## (undated) DEVICE — GLOVE SENSICARE NEOPRENE 6.5

## (undated) DEVICE — ADHESIVE DERMABOND ADVANCED

## (undated) DEVICE — KIT SURGICAL TURNOVER